# Patient Record
Sex: MALE
[De-identification: names, ages, dates, MRNs, and addresses within clinical notes are randomized per-mention and may not be internally consistent; named-entity substitution may affect disease eponyms.]

---

## 2017-12-26 ENCOUNTER — HOSPITAL ENCOUNTER (OUTPATIENT)
Dept: HOSPITAL 42 - ED | Age: 27
Setting detail: OBSERVATION
LOS: 2 days | Discharge: HOME | End: 2017-12-28
Attending: HOSPITALIST | Admitting: HOSPITALIST
Payer: OTHER GOVERNMENT

## 2017-12-26 VITALS — BODY MASS INDEX: 28.5 KG/M2

## 2017-12-26 DIAGNOSIS — R40.2412: ICD-10-CM

## 2017-12-26 DIAGNOSIS — J10.1: ICD-10-CM

## 2017-12-26 DIAGNOSIS — Z87.891: ICD-10-CM

## 2017-12-26 DIAGNOSIS — K52.9: ICD-10-CM

## 2017-12-26 DIAGNOSIS — A41.9: Primary | ICD-10-CM

## 2017-12-26 PROCEDURE — 84484 ASSAY OF TROPONIN QUANT: CPT

## 2017-12-26 PROCEDURE — 84145 PROCALCITONIN (PCT): CPT

## 2017-12-26 PROCEDURE — 87045 FECES CULTURE AEROBIC BACT: CPT

## 2017-12-26 PROCEDURE — 96376 TX/PRO/DX INJ SAME DRUG ADON: CPT

## 2017-12-26 PROCEDURE — 87430 STREP A AG IA: CPT

## 2017-12-26 PROCEDURE — 80320 DRUG SCREEN QUANTALCOHOLS: CPT

## 2017-12-26 PROCEDURE — 83992 ASSAY FOR PHENCYCLIDINE: CPT

## 2017-12-26 PROCEDURE — 80353 DRUG SCREENING COCAINE: CPT

## 2017-12-26 PROCEDURE — 80324 DRUG SCREEN AMPHETAMINES 1/2: CPT

## 2017-12-26 PROCEDURE — 83690 ASSAY OF LIPASE: CPT

## 2017-12-26 PROCEDURE — 96374 THER/PROPH/DIAG INJ IV PUSH: CPT

## 2017-12-26 PROCEDURE — 83735 ASSAY OF MAGNESIUM: CPT

## 2017-12-26 PROCEDURE — 71010: CPT

## 2017-12-26 PROCEDURE — 87389 HIV-1 AG W/HIV-1&-2 AB AG IA: CPT

## 2017-12-26 PROCEDURE — 80349 CANNABINOIDS NATURAL: CPT

## 2017-12-26 PROCEDURE — 93005 ELECTROCARDIOGRAM TRACING: CPT

## 2017-12-26 PROCEDURE — 87804 INFLUENZA ASSAY W/OPTIC: CPT

## 2017-12-26 PROCEDURE — 84100 ASSAY OF PHOSPHORUS: CPT

## 2017-12-26 PROCEDURE — 80358 DRUG SCREENING METHADONE: CPT

## 2017-12-26 PROCEDURE — 81001 URINALYSIS AUTO W/SCOPE: CPT

## 2017-12-26 PROCEDURE — 87324 CLOSTRIDIUM AG IA: CPT

## 2017-12-26 PROCEDURE — 36415 COLL VENOUS BLD VENIPUNCTURE: CPT

## 2017-12-26 PROCEDURE — 87070 CULTURE OTHR SPECIMN AEROBIC: CPT

## 2017-12-26 PROCEDURE — 85730 THROMBOPLASTIN TIME PARTIAL: CPT

## 2017-12-26 PROCEDURE — 80345 DRUG SCREENING BARBITURATES: CPT

## 2017-12-26 PROCEDURE — 85025 COMPLETE CBC W/AUTO DIFF WBC: CPT

## 2017-12-26 PROCEDURE — 74177 CT ABD & PELVIS W/CONTRAST: CPT

## 2017-12-26 PROCEDURE — 82550 ASSAY OF CK (CPK): CPT

## 2017-12-26 PROCEDURE — 87086 URINE CULTURE/COLONY COUNT: CPT

## 2017-12-26 PROCEDURE — 80361 OPIATES 1 OR MORE: CPT

## 2017-12-26 PROCEDURE — 72132 CT LUMBAR SPINE W/DYE: CPT

## 2017-12-26 PROCEDURE — 96375 TX/PRO/DX INJ NEW DRUG ADDON: CPT

## 2017-12-26 PROCEDURE — 82150 ASSAY OF AMYLASE: CPT

## 2017-12-26 PROCEDURE — 83615 LACTATE (LD) (LDH) ENZYME: CPT

## 2017-12-26 PROCEDURE — 82803 BLOOD GASES ANY COMBINATION: CPT

## 2017-12-26 PROCEDURE — 80346 BENZODIAZEPINES1-12: CPT

## 2017-12-26 PROCEDURE — 99285 EMERGENCY DEPT VISIT HI MDM: CPT

## 2017-12-26 PROCEDURE — 80053 COMPREHEN METABOLIC PANEL: CPT

## 2017-12-26 PROCEDURE — 85610 PROTHROMBIN TIME: CPT

## 2017-12-26 PROCEDURE — 87040 BLOOD CULTURE FOR BACTERIA: CPT

## 2017-12-26 NOTE — ED PDOC
Arrival/HPI





- General


Chief Complaint: GI Problem


Time Seen by Provider: 12/26/17 22:41


Historian: Patient





- History of Present Illness


Narrative History of Present Illness (Text): 





12/26/17 23:37


27 year old male, who denies any past medical history, presents complaining of 

nausea, vomiting, and generalized body aches for the past 24 hours. Patient 

states all her joints ache. Patient reports a fever of "102" this morning and 

lower back discomfort, but any chills, chest pain, shortness of breath, diarrhea

, urinary symptoms, neck pain, headache, dizziness, or any other complaints. 








Time/Duration: 24 hours


Symptom Onset: Sudden


Symptom Course: Unchanged


Activities at Onset: Light


Context: Home





Past Medical History





- Provider Review


Nursing Documentation Reviewed: Yes





- Infectious Disease


Hx of Infectious Diseases: None





- Tetanus Immunization


Tetanus Immunization: Unknown





- Past Medical History


Past Medical History: No Previous





- Cardiac


Hx Cardiac Disorders: No





- Pulmonary


Hx Respiratory Disorders: No





- Neurological


Hx Neurological Disorder: No





- HEENT


Hx HEENT Disorder: No





- Renal


Hx Renal Disorder: No





- Endocrine/Metabolic


Hx Endocrine Disorders: No





- Hematological/Oncological


Hx Blood Disorders: No





- Integumentary


Hx Dermatological Disorder: No





- Musculoskeletal/Rheumatological


Hx Musculoskeletal Disorders: No





- Gastrointestinal


Hx Gastrointestinal Disorders: No





- Genitourinary/Gynecological


Hx Genitourinary Disorders: No





- Psychiatric


Hx Psychophysiologic Disorder: No


Hx Depression: No


Hx Emotional Abuse: No


Hx Physical Abuse: No


Hx Substance Use: No





- Past Surgical History


Past Surgical History: No Previous





- Anesthesia


Hx Anesthesia: No


Hx Anesthesia Reactions: No


Hx Malignant Hyperthermia: No





- Suicidal Assessment


Feels Threatened In Home Enviroment: No





Family/Social History





- Physician Review


Nursing Documentation Reviewed: Yes


Family/Social History: No Known Family HX


Smoking Status: Light Smoker < 10 Cigarettes Daily


Hx Alcohol Use: No


Hx Substance Use: No


Hx Substance Use Treatment: No





Allergies/Home Meds


Allergies/Adverse Reactions: 


Allergies





No Known Allergies Allergy (Verified 12/27/17 13:52)


 








Home Medications: 


 Home Meds











 Medication  Instructions  Recorded  Confirmed


 


traZODone [trazODONE HYDROCHLORIDE] 50 mg PO PRN PRN 12/27/17 12/27/17














Review of Systems





- Physician Review


All systems were reviewed & negative as marked: Yes





- Review of Systems


Constitutional: Fevers.  absent: Other


Respiratory: absent: SOB


Cardiovascular: absent: Chest Pain


Gastrointestinal: Nausea, Vomiting.  absent: Diarrhea


Genitourinary Male: absent: Dysuria, Frequency, Hematuria


Musculoskeletal: Back Pain, Other (Generalized body aches).  absent: Neck Pain


Neurological: absent: Headache, Dizziness





Physical Exam


Vital Signs Reviewed: Yes


Vital Signs











  Temp Pulse Resp BP Pulse Ox


 


 12/27/17 10:01  100.5 F H    


 


 12/27/17 09:54  100.5 F H  86  18  144/91 H  98


 


 12/27/17 07:34   68  18  140/94 H  98


 


 12/27/17 05:27   78  16  141/86  97


 


 12/27/17 03:25  98.1 F  61  17  127/84  97


 


 12/27/17 02:56   78  19  132/74  98


 


 12/27/17 02:36  100.0 F H    


 


 12/27/17 01:46  100.0 F H  88  20  148/84  97


 


 12/26/17 22:59  102.3 F H  101 H  17  156/92 H  99


 


 12/26/17 22:58  102.3 F H  101 H  20  156/92 H  99











Temperature: Febrile


Blood Pressure: Normal


Pulse: Tachycardic


Respiratory Rate: Normal


Appearance: Positive for: Well-Appearing, Non-Toxic, Comfortable


Pain Distress: None


Mental Status: Positive for: Alert and Oriented X 3





- Systems Exam


Head: Present: Atraumatic, Normocephalic


Pupils: Present: PERRL


Extroacular Muscles: Present: EOMI


Conjunctiva: Present: Normal


Mouth: Present: Moist Mucous Membranes


Pharnyx: Present: ERYTHEMA (minimal)


Neck: Present: Normal Range of Motion.  No: Meningeal Signs


Respiratory/Chest: Present: Clear to Auscultation, Good Air Exchange.  No: 

Respiratory Distress, Accessory Muscle Use


Cardiovascular: Present: Regular Rate and Rhythm, Normal S1, S2.  No: Murmurs


Abdomen: Present: Normal Bowel Sounds.  No: Tenderness, Distention, Peritoneal 

Signs


Back: Present: Normal Inspection.  No: CVA Tenderness, Other (Dorsal Spine 

tenderness)


Upper Extremity: Present: Normal Inspection, Normal ROM, Neurovascularly 

Intact.  No: Cyanosis, Edema


Lower Extremity: Present: Normal Inspection, Normal ROM, Neurovascularly 

Intact.  No: Edema


Neurological: Present: GCS=15, CN II-XII Intact, Speech Normal, Motor Func 

Grossly Intact, Normal Sensory Function, Normal Cerebellar Funct, Norm Deep 

Tendon Reflexes


Skin: Present: Warm, Dry, Normal Color.  No: Rashes


Psychiatric: Present: Alert, Oriented x 3, Normal Insight, Normal Concentration





Medical Decision Making


ED Course and Treatment: 





12/26/17 23:36


Impression:


27 year old male presents complaining of nausea, vomiting, and generlized body 

aches for the past 24 hours associated with fever and lower back pain. 





Plan:


-- VBG 


-- EKG 


-- Labs 


-- Chest X-ray 


-- Lactated Ringer's 2,990 ml


-- Pepcid


-- Toradol


-- Zofran Inj


-- Blood Culture


-- Urine Cultrue


-- Urinalysis 


-- Reassess and disposition





Progress Notes:


12/27/17 00:11


EKG shows Sinus Tachycardia at 103 BPM with septal infarct. Non-specific ST/T 

wave changes. Interpreted by me. 


 


12/27/17 00:40


Labs were reviewed and showed Lactate 8.6. Code Sepsis called.





12/27/17 00:44


CXR Impression: As read by me, NAD


 


12/27/17 00:59


Discussed case with Resident and intensivist Dr. Silver who is aware and agrees 

with the plan.To ED to evaluate/admit pt. Patient will be admitted for Sepsis 





- Lab Interpretations


Lab Results: 








 12/26/17 23:54 





 12/26/17 23:54 





 Lab Results





12/27/17 00:11: Urine Color Yellow, Urine Appearance Clear, Urine pH 8.5, Ur 

Specific Gravity 1.015, Urine Protein 100 H, Urine Glucose (UA) Negative, Urine 

Ketones Trace H, Urine Blood Negative, Urine Nitrate Negative, Urine Bilirubin 

Small H, Urine Urobilinogen 2.0 H, Ur Leukocyte Esterase Negative, Urine RBC 0 

- 2, Urine WBC 0 - 2, Ur Epithelial Cells 0 - 2


12/26/17 23:54: Alcohol, Quantitative < 10


12/26/17 23:54: Lactate Dehydrogenase 661, Total Creatine Kinase 143, Troponin 

I < 0.01


12/26/17 23:54: Sodium 143, Chloride 101, Potassium 4.0, Carbon Dioxide 19 L, 

Anion Gap 27 H, BUN 9, Creatinine 1.1, Est GFR (African Amer) > 60, Est GFR (Non

-Af Amer) > 60, Random Glucose 120 H, Calcium 10.4, Phosphorus 1.9 L, Magnesium 

1.3 L, Total Bilirubin 1.4 H, AST 96 H, ALT 77 H, Alkaline Phosphatase 81, 

Total Protein 9.1 H, Albumin 5.3 H, Globulin 3.8, Albumin/Globulin Ratio 1.4


12/26/17 23:54: pO2 56 H, VBG pH 7.43, VBG pCO2 32.0 L, VBG HCO3 21.2, VBG 

Total CO2 22.2, VBG O2 Sat (Calc) 93.6 H, VBG Base Excess -2.3 L, VBG Potassium 

4.4, Sodium 141.0, Chloride 99.0, Glucose 119 H, Lactate 8.6 H*, FiO2 21.0, 

Venous Blood Potassium 4.4


12/26/17 23:54: PT 12.3, INR 1.13 H, APTT 28.2


12/26/17 23:54: WBC 7.7, RBC 5.13, Hgb 17.7, Hct 50.3, MCV 98.1, MCH 34.5, MCHC 

35.2, RDW 12.1, Plt Count 195, MPV 11.1 H, Gran % 49.4 L, Lymph % (Auto) 32.9, 

Mono % (Auto) 16.6 H, Eos % (Auto) 0.5 L, Baso % (Auto) 0.6, Gran # 3.82, Lymph 

# 2.5, Mono # 1.3 H, Eos # 0.0, Baso # 0.05








I have reviewed the lab results: Yes





- RAD Interpretation


Radiology Orders: 








12/26/17 23:27


CHEST PORTABLE [RAD] Stat 





12/27/17 01:03


LUMBAR SPINE W/CONTRAST [CT] Stat 





12/27/17 01:34


ABDOMEN & PELVIS [ABD PELVIS PO & IV CONTRAST] [CT] Stat 














- EKG Interpretation


Interpreted by ED Physician: Yes


Type: 12 lead EKG





- Medication Orders


Current Medication Orders: 








Acetaminophen (Tylenol 325mg Tab)  650 mg PO Q6H PRN


   PRN Reason: Fever >100.4 F


   Last Admin: 12/27/17 10:01  Dose: 650 mg





MAR Pain/Vitals


 Document     12/27/17 10:01  [facility]  (Rec: 12/27/17 10:01  [facility]  [id number])


     Pain Reassessment


      Is This A Pain ReAssessment?               No


     Vitals


      Temperature (97.6 F-99.6 F)                100.5 F





Benzonatate (Tessalon Perles)  100 mg PO TID [facility]


   Last Admin: 12/27/17 15:00  Dose: 100 mg





Guaifenesin (Robitussin)  100 mg PO Q4H PRN


   PRN Reason: Cough


   Last Admin: 12/27/17 12:35  Dose: 100 mg





Sodium Chloride (Sodium Chloride 0.9%)  1,000 mls @ 100 mls/hr IV .Q10H [facility]


   Last Admin: 12/27/17 12:30  Dose: 100 mls/hr





eMAR Start Stop


 Document     12/27/17 12:30  LMN  (Rec: 12/27/17 13:15  LMN  [facility])


     Intravenous Solution


      Start Date                                 12/27/17


      Start Time                                 12:30





Morphine Sulfate (Morphine)  0.5 mg IVP Q4H PRN


   PRN Reason: Pain, moderate (4-7)


   Last Admin: 12/27/17 14:49  Dose: 0.5 mg





MAR Pain Assessment


 Document     12/27/17 14:49  LMN  (Rec: 12/27/17 14:50  LMN  [place] [id number])


     Pain Reassessment


      Is this a pain reassessment?               No


     Presence of Pain


      Presence of Pain                           Yes


     Pain Scale Used


      Pain Scale Used                            Numeric


     Location


      Pain Location Body Site                    Back


     Description


      Description                                Constant


      Intensity of Pain at present               8


IVP Administration


 Document     12/27/17 14:49  LMN  (Rec: 12/27/17 14:50  LMN  [place] [id number])


     Charges for Administration


      # of IVP Administrations                   1





Ondansetron HCl (Zofran Inj)  4 mg IVP Q4H PRN


   PRN Reason: Nausea/Vomiting


   Last Admin: 12/27/17 04:53  Dose: 4 mg





IVP Administration


 Document     12/27/17 04:53  CNR  (Rec: 12/27/17 04:53  CNR  [id number])


     Charges for Administration


      # of IVP Administrations                   1





Oseltamivir Phosphate (Tamiflu Cap)  75 mg PO BID [facility]


   PRN Reason: Protocol


   Stop: 01/01/18 05:30


   Last Admin: 12/27/17 10:01  Dose: 75 mg





Pantoprazole Sodium (Protonix Inj)  40 mg IVP DAILY [facility]


   Last Admin: 12/27/17 10:00  Dose: 40 mg





IVP Administration


 Document     12/27/17 10:00  CMA  (Rec: 12/27/17 10:00  [facility]  [id number])


     Charges for Administration


      # of IVP Administrations                   1





Sennosides (Senokot Tab)  8.6 mg PO DAILY PRN


   PRN Reason: Constipation





Discontinued Medications





Acetaminophen (Tylenol 325mg Tab)  650 mg PO STAT STA


   Stop: 12/27/17 00:05


   Last Admin: 12/27/17 02:36  Dose: 650 mg





MAR Pain/Vitals


 Document     12/27/17 02:36  [email or web address]  (Rec: 12/27/17 02:37  [id number]


     Vitals


      Temperature (97.6 F-99.6 F)                100.0 F





Famotidine (Pepcid)  20 mg IVP STAT STA


   Stop: 12/26/17 23:32


   Last Admin: 12/27/17 00:32  Dose: 20 mg





IVP Administration


 Document     12/27/17 00:32  CASTS1  (Rec: 12/27/17 00:32  [address]-[id number])


     Charges for Administration


      # of IVP Administrations                   1





Lactated Ringer's 2,990 ml/ IV (SUPPLIES)  2,990 mls @ 5,987.4 mls/hr IV ONCE 

ONE


   PRN Reason: 60 ML/KG/HR


   Stop: 12/26/17 23:28


   Last Admin: 12/27/17 00:03  Dose: 5,987.4 mls/hr





eMAR Start Stop


 Document     12/27/17 00:03  CASTS1  (Rec: 12/27/17 00:04  [address]-[id number])


     Intravenous Solution


      Start Date                                 12/27/17


      Start Time                                 00:04


      End Date                                   12/27/17





Ceftriaxone Sodium (Rocephin 2 Gm Ivpb)  2 gm in 100 mls @ 100 mls/hr IVPB STAT 

STA


   PRN Reason: Protocol


   Stop: 12/27/17 01:44


   Last Admin: 12/27/17 01:08  Dose: 100 mls/hr





eMAR Start Stop


 Document     12/27/17 01:08  CASTS1  (Rec: 12/27/17 01:09  [address]-[id number])


     Intravenous Solution


      Start Date                                 12/27/17


      Start Time                                 01:09


      End Date                                   12/27/17





Vancomycin HCl (Vancomycin 1gm)  1 gm in 250 mls @ 167 mls/hr IVPB STAT STA


   PRN Reason: Protocol


   Stop: 12/27/17 02:14


   Last Admin: 12/27/17 02:54  Dose: 167 mls/hr





eMAR Start Stop


 Document     12/27/17 02:54  CASTS1  (Rec: 12/27/17 02:54  [facility]  [id number]


     Intravenous Solution


      Start Date                                 12/27/17


      Start Time                                 02:54


      End Date                                   12/27/17





Magnesium Sulfate 2 gm/ Sodium (Chloride)  104 mls @ 102 mls/hr IV ONCE ONE


   Stop: 12/27/17 02:31


   Last Admin: 12/27/17 04:38  Dose: 102 mls/hr





eMAR Start Stop


 Document     12/27/17 04:38  CNR  (Rec: 12/27/17 04:38  CNR  [id number])


     Intravenous Solution


      Start Date                                 12/27/17


      Start Time                                 04:38





Potassium Phosphate 15 mmole/ (Sodium Chloride)  255 mls @ 42.5 mls/hr IVPB 

ONCE ONE


   Stop: 12/27/17 07:29


   Last Admin: 12/27/17 02:54  Dose: 42.5 mls/hr





eMAR Start Stop


 Document     12/27/17 02:54  CASTS1  (Rec: 12/27/17 02:54  CASTS[facility]  [id number][place]


     Intravenous Solution


      Start Date                                 12/27/17


      Start Time                                 02:54


      End Date                                   12/27/17





Sodium Chloride (Sodium Chloride 0.9%)  1,000 mls @ 150 mls/hr IV .Q6H40M [facility]


   Last Admin: 12/27/17 09:15  Dose: 150 mls/hr





eMAR Start Stop


 Document     12/27/17 09:15  CMA  (Rec: 12/27/17 09:16  CMA  [id number])


     Intravenous Solution


      Start Date                                 12/27/17


      Start Time                                 09:16





Piperacillin Sod/Tazobactam Sod (Zosyn 3.375 In Ns 100ml)  100 mls @ 200 mls/hr 

IVPB Q6 [facility]


   PRN Reason: Protocol


   Stop: 12/27/17 12:29


   Last Admin: 12/27/17 11:48  Dose: 200 mls/hr





eMAR Start Stop


 Document     12/27/17 11:48  LMN  (Rec: 12/27/17 11:48  LMN  [id number])


     Intravenous Solution


      Start Date                                 12/27/17


      Start Time                                 11:48





Ketorolac Tromethamine (Toradol)  30 mg IVP ONCE ONE


   Stop: 12/26/17 23:32


   Last Admin: 12/27/17 00:04  Dose: 30 mg





MAR Pain Assessment


 Document     12/27/17 00:04  [facility]  (Rec: 12/27/17 00:04  [address]-[id number])


     Pain Reassessment


      Is this a pain reassessment?               No


     Sleep


      Is patient sleeping during reassessment?   No


     Presence of Pain


      Presence of Pain                           Yes


     Pain Scale Used


      Pain Scale Used                            Numeric


     Location


      Pain Location Body Site                    Abdomen


     Description


      Description                                Constant


      Intensity of Pain at present               8


      Pain Behavior                              Facial Grimacing


      Aggravating Factors                        Changing Position


      Alleviating Factors/Management             Medication


       Techniques                                


      Alleviating Factors                        Medication


IVP Administration


 Document     12/27/17 00:04  [facility]  (Rec: 12/27/17 00:04  [address]-[id number])


     Charges for Administration


      # of IVP Administrations                   1





Magnesium Oxide (Mag-Ox)  400 mg PO STAT STA


   Stop: 12/27/17 09:34


   Last Admin: 12/27/17 10:00  Dose: 400 mg





Ondansetron HCl (Zofran Inj)  4 mg IVP ONCE ONE


   Stop: 12/26/17 23:32


   Last Admin: 12/27/17 00:04  Dose: 4 mg





IVP Administration


 Document     12/27/17 00:04  [facility]  (Rec: 12/27/17 00:04  [address]-[id number])


     Charges for Administration


      # of IVP Administrations                   1





Pneumococcal Polyvalent Vaccine (Pneumovax 23 Vaccine)  0.5 ml IM .ONCE ONE


   Stop: 12/27/17 16:33


Potassium Chloride (K-Dur 20 Meq Er Tab)  40 meq PO STAT STA


   Stop: 12/27/17 09:34


   Last Admin: 12/27/17 10:02  Dose: 40 meq





Sennosides (Senokot Tab)  8.6 mg PO DAILY [facility]











- Scribe Statement


The provider has reviewed the documentation as recorded by the Jim Bowles





Provider Scribe Attestation:


All medical record entries made by the Scribe were at my direction and 

personally dictated by me. I have reviewed the chart and agree that the record 

accurately reflects my personal performance of the history, physical exam, 

medical decision making, and the department course for this patient. I have 

also personally directed, reviewed, and agree with the discharge instructions 

and disposition.








Disposition/Present on Arrival





- Present on Arrival


Any Indicators Present on Arrival: No


History of DVT/PE: No


History of Uncontrolled Diabetes: No


Urinary Catheter: No


History of Decub. Ulcer: No


History Surgical Site Infection Following: None





- Disposition


Have Diagnosis and Disposition been Completed?: Yes


Diagnosis: 


 Sepsis





Disposition: HOSPITALIZED


Disposition Time: 01:48


Patient Plan: Admission


Patient Problems: 


 Current Active Problems











Problem Status Onset


 


Sepsis Acute  











Condition: STABLE

## 2017-12-27 LAB
ALBUMIN SERPL-MCNC: 3.7 G/DL (ref 3–4.8)
ALBUMIN SERPL-MCNC: 5.3 G/DL (ref 3–4.8)
ALBUMIN/GLOB SERPL: 1.4 {RATIO} (ref 1.1–1.8)
ALBUMIN/GLOB SERPL: 1.4 {RATIO} (ref 1.1–1.8)
ALT SERPL-CCNC: 55 U/L (ref 7–56)
ALT SERPL-CCNC: 77 U/L (ref 7–56)
AMYLASE SERPL-CCNC: 68 U/L (ref 35–125)
APPEARANCE UR: CLEAR
APTT BLD: 28.2 SECONDS (ref 25.1–36.5)
AST SERPL-CCNC: 59 U/L (ref 17–59)
AST SERPL-CCNC: 96 U/L (ref 17–59)
BASE EXCESS BLDV CALC-SCNC: -2.3 MMOL/L (ref 0–2)
BASE EXCESS BLDV CALC-SCNC: 2 MMOL/L (ref 0–2)
BASOPHILS # BLD AUTO: 0.04 K/MM3 (ref 0–2)
BASOPHILS # BLD AUTO: 0.05 K/MM3 (ref 0–2)
BASOPHILS NFR BLD: 0.6 % (ref 0–3)
BASOPHILS NFR BLD: 1.1 % (ref 0–3)
BILIRUB UR-MCNC: (no result) MG/DL
BUN SERPL-MCNC: 8 MG/DL (ref 7–21)
BUN SERPL-MCNC: 9 MG/DL (ref 7–21)
CALCIUM SERPL-MCNC: 10.4 MG/DL (ref 8.4–10.5)
CALCIUM SERPL-MCNC: 8 MG/DL (ref 8.4–10.5)
COLOR UR: YELLOW
EOSINOPHIL # BLD: 0 10*3/UL (ref 0–0.7)
EOSINOPHIL # BLD: 0 10*3/UL (ref 0–0.7)
EOSINOPHIL NFR BLD: 0.3 % (ref 1.5–5)
EOSINOPHIL NFR BLD: 0.5 % (ref 1.5–5)
EPI CELLS #/AREA URNS HPF: (no result) /HPF (ref 0–5)
ERYTHROCYTE [DISTWIDTH] IN BLOOD BY AUTOMATED COUNT: 12.1 % (ref 11.5–14.5)
ERYTHROCYTE [DISTWIDTH] IN BLOOD BY AUTOMATED COUNT: 12.2 % (ref 11.5–14.5)
GFR NON-AFRICAN AMERICAN: > 60
GFR NON-AFRICAN AMERICAN: > 60
GLUCOSE UR STRIP-MCNC: NEGATIVE MG/DL
GRANULOCYTES # BLD: 2.52 10*3/UL (ref 1.4–6.5)
GRANULOCYTES # BLD: 3.82 10*3/UL (ref 1.4–6.5)
GRANULOCYTES NFR BLD: 49.4 % (ref 50–68)
GRANULOCYTES NFR BLD: 66.9 % (ref 50–68)
HGB BLD-MCNC: 14 G/DL (ref 14–18)
HGB BLD-MCNC: 17.7 G/DL (ref 14–18)
INR PPP: 1.13 (ref 0.93–1.08)
LEUKOCYTE ESTERASE UR-ACNC: NEGATIVE LEU/UL
LIPASE SERPL-CCNC: 167 U/L (ref 23–300)
LYMPHOCYTES # BLD: 0.6 10*3/UL (ref 1.2–3.4)
LYMPHOCYTES # BLD: 2.5 10*3/UL (ref 1.2–3.4)
LYMPHOCYTES NFR BLD AUTO: 16 % (ref 22–35)
LYMPHOCYTES NFR BLD AUTO: 32.9 % (ref 22–35)
MAGNESIUM SERPL-MCNC: 1.3 MG/DL (ref 1.7–2.2)
MAGNESIUM SERPL-MCNC: 1.5 MG/DL (ref 1.7–2.2)
MCH RBC QN AUTO: 33.7 PG (ref 25–35)
MCH RBC QN AUTO: 34.5 PG (ref 25–35)
MCHC RBC AUTO-ENTMCNC: 34.2 G/DL (ref 31–37)
MCHC RBC AUTO-ENTMCNC: 35.2 G/DL (ref 31–37)
MCV RBC AUTO: 98.1 FL (ref 80–105)
MCV RBC AUTO: 98.6 FL (ref 80–105)
MONOCYTES # BLD AUTO: 0.6 10*3/UL (ref 0.1–0.6)
MONOCYTES # BLD AUTO: 1.3 10*3/UL (ref 0.1–0.6)
MONOCYTES NFR BLD: 15.7 % (ref 1–6)
MONOCYTES NFR BLD: 16.6 % (ref 1–6)
PH BLDV: 7.4 [PH] (ref 7.32–7.43)
PH BLDV: 7.43 [PH] (ref 7.32–7.43)
PH UR STRIP: 8.5 [PH] (ref 4.7–8)
PLATELET # BLD: 144 10^3/UL (ref 120–450)
PLATELET # BLD: 195 10^3/UL (ref 120–450)
PMV BLD AUTO: 10.6 FL (ref 7–11)
PMV BLD AUTO: 11.1 FL (ref 7–11)
PROT UR STRIP-MCNC: 100 MG/DL
PROTHROMBIN TIME: 12.3 SECONDS (ref 9.4–12.5)
RBC # BLD AUTO: 4.15 10^6/UL (ref 3.5–6.1)
RBC # BLD AUTO: 5.13 10^6/UL (ref 3.5–6.1)
RBC # UR STRIP: NEGATIVE /UL
RBC #/AREA URNS HPF: (no result) /HPF (ref 0–2)
SP GR UR STRIP: 1.01 (ref 1–1.03)
TROPONIN I SERPL-MCNC: < 0.01 NG/ML
URINE NITRATE: NEGATIVE
UROBILINOGEN UR STRIP-ACNC: 2 E.U./DL
VENOUS BLOOD FIO2: 21 %
VENOUS BLOOD FIO2: 21 %
VENOUS BLOOD GAS PCO2: 32 (ref 40–60)
VENOUS BLOOD GAS PCO2: 44 (ref 40–60)
VENOUS BLOOD GAS PO2: 48 MM/HG (ref 30–55)
VENOUS BLOOD GAS PO2: 56 MM/HG (ref 30–55)
WBC # BLD AUTO: 3.8 10^3/UL (ref 4.5–11)
WBC # BLD AUTO: 7.7 10^3/UL (ref 4.5–11)
WBC #/AREA URNS HPF: (no result) /HPF (ref 0–6)

## 2017-12-27 RX ADMIN — MORPHINE SULFATE PRN MG: 2 INJECTION, SOLUTION INTRAMUSCULAR; INTRAVENOUS at 19:02

## 2017-12-27 RX ADMIN — MORPHINE SULFATE PRN MG: 2 INJECTION, SOLUTION INTRAMUSCULAR; INTRAVENOUS at 14:49

## 2017-12-27 RX ADMIN — PIPERACILLIN AND TAZOBACTAM SCH MLS/HR: 3; .375 INJECTION, POWDER, LYOPHILIZED, FOR SOLUTION INTRAVENOUS; PARENTERAL at 06:49

## 2017-12-27 RX ADMIN — MORPHINE SULFATE PRN MG: 2 INJECTION, SOLUTION INTRAMUSCULAR; INTRAVENOUS at 23:02

## 2017-12-27 RX ADMIN — MORPHINE SULFATE PRN MG: 2 INJECTION, SOLUTION INTRAMUSCULAR; INTRAVENOUS at 02:36

## 2017-12-27 RX ADMIN — MORPHINE SULFATE PRN MG: 2 INJECTION, SOLUTION INTRAMUSCULAR; INTRAVENOUS at 06:43

## 2017-12-27 RX ADMIN — PIPERACILLIN AND TAZOBACTAM SCH MLS/HR: 3; .375 INJECTION, POWDER, LYOPHILIZED, FOR SOLUTION INTRAVENOUS; PARENTERAL at 11:48

## 2017-12-27 NOTE — PCM.SEPTIC
<Jazzy Burnett - Last Filed: 12/27/17 06:29>





Sepsis Progress Note





- Reassessment Type


Date of Evaluation: 12/27/17


Time of Evaluation: 06:05


Reassessment Type: Non-invasive reassessment





- Non Invasive Reassessment


Were the most recent vital sign reviewed: Yes


Vital Sign (Latest): 


 











Temp Pulse Resp BP Pulse Ox


 


 98.1 F   78   16   141/86   97 


 


 12/27/17 03:25  12/27/17 05:27  12/27/17 05:27  12/27/17 05:27  12/27/17 05:27








Cardiovascular: Yes: Regular Rate, Rhythm.  No: Chest Non Tender, Edema, Gallop

, JVD, Murmur, Bradycardia, Tachycardia, Ectopy, Friction Rub, Irregularly 

Irregular


Respiratory: Yes: Normal Breath Sounds.  No: Decreased Breath Sounds, Accessory 

Muscle Use, Crackles, Rales, Rhonchi, Stridor, Wheezing, Respiratory Distress, 

Plerual Rub


Capillary Refill: Normal (Less than 2 sec)


Pulses: Normal Radial, Normal Dorsalis Pedis, Normal Posterior Tibialis


Skin: Normal Color, Warm





- Invasive Reassessment (complete 2 of 4)


Was a Central Venous Pressure Measurement obtained within 6 Hours after the 

presentation of septic shock: No


Was a central venous oxygen measurement obtained within 6 hours after the 

presentation of septic shock: No


Was a bedside cardiovascular ultrasound performed within 6 hours after the 

presentation of septic shock: No


Was a passive leg raise performed or was a fluid challenge performed within 6 

hrs of the initial fluid bolus: No


Passive Leg Raise Result: Not Applicable


Fluid Challenge performed: No





<West Silver MD - Last Filed: 12/27/17 07:22>





Sepsis Progress Note





- Non Invasive Reassessment


Vital Sign (Latest): 


 











Temp Pulse Resp BP Pulse Ox


 


 98.1 F   78   16   141/86   97 


 


 12/27/17 03:25  12/27/17 05:27  12/27/17 05:27  12/27/17 05:27  12/27/17 05:27
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
## 2017-12-27 NOTE — CT
EXAM:

  CT Lumbar Spine With Intravenous Contrast



EXAM DATE/TIME:

  12/27/2017 1:03 AM



CLINICAL HISTORY:

  27 years old, male; Pain; Low back pain; Additional info: Pain/fever



TECHNIQUE:

  Axial computed tomography images of the lumbar spine with intravenous 

contrast.  All CT scans at this facility use one or more dose reduction 

techniques, viz.: automated exposure control; ma/kV adjustment per patient size 

(including targeted exams where dose is matched to indication; i.e. head); or 

iterative reconstruction technique.

  Coronal and sagittal reformatted images were created and reviewed.



CONTRAST:

  100 mL of OMNI 350 administered intravenously.



COMPARISON:

  No relevant prior studies available.



FINDINGS:

  Vertebrae:  Unremarkable.  No acute fracture.

  Discs/spinal canal/neural foramina:  No acute findings.  No spinal canal 

stenosis.

  Soft tissues:  No paravertebral edema.



IMPRESSION:     

  No acute abnormality.

## 2017-12-27 NOTE — CARD
--------------- APPROVED REPORT --------------





EKG Measurement

Heart [id number]

NM 150P53

[id number]

[id number]

[id number]



<Conclusion>

Sinus tachycardia

Possible Left atrial enlargement

Septal infarct, age undetermined

Abnormal ECG

## 2017-12-28 VITALS
TEMPERATURE: 98.9 F | RESPIRATION RATE: 20 BRPM | OXYGEN SATURATION: 99 % | SYSTOLIC BLOOD PRESSURE: 154 MMHG | HEART RATE: 66 BPM | DIASTOLIC BLOOD PRESSURE: 94 MMHG

## 2017-12-28 LAB
ALBUMIN SERPL-MCNC: 3.9 G/DL (ref 3–4.8)
ALBUMIN/GLOB SERPL: 1.4 {RATIO} (ref 1.1–1.8)
ALT SERPL-CCNC: 132 U/L (ref 7–56)
AST SERPL-CCNC: 166 U/L (ref 17–59)
BASOPHILS # BLD AUTO: 0.04 K/MM3 (ref 0–2)
BASOPHILS NFR BLD: 1.5 % (ref 0–3)
BUN SERPL-MCNC: 5 MG/DL (ref 7–21)
CALCIUM SERPL-MCNC: 8.7 MG/DL (ref 8.4–10.5)
EOSINOPHIL # BLD: 0.1 10*3/UL (ref 0–0.7)
EOSINOPHIL NFR BLD: 2.7 % (ref 1.5–5)
EOSINOPHIL NFR BLD: 3 % (ref 0–3)
ERYTHROCYTE [DISTWIDTH] IN BLOOD BY AUTOMATED COUNT: 12.2 % (ref 11.5–14.5)
GFR NON-AFRICAN AMERICAN: > 60
GRANULOCYTES # BLD: 0.87 10*3/UL (ref 1.4–6.5)
GRANULOCYTES NFR BLD: 33.1 % (ref 50–68)
HGB BLD-MCNC: 14.6 G/DL (ref 14–18)
LYMPHOCYTE: 41 % (ref 22–35)
LYMPHOCYTES # BLD: 1 10*3/UL (ref 1.2–3.4)
LYMPHOCYTES NFR BLD AUTO: 39.5 % (ref 22–35)
MAGNESIUM SERPL-MCNC: 2 MG/DL (ref 1.7–2.2)
MCH RBC QN AUTO: 33.3 PG (ref 25–35)
MCHC RBC AUTO-ENTMCNC: 33.8 G/DL (ref 31–37)
MCV RBC AUTO: 98.4 FL (ref 80–105)
MONOCYTE: 16 % (ref 1–6)
MONOCYTES # BLD AUTO: 0.6 10*3/UL (ref 0.1–0.6)
MONOCYTES NFR BLD: 23.2 % (ref 1–6)
NEUTROPHILS NFR BLD AUTO: 36 % (ref 50–70)
NEUTS BAND NFR BLD: 1 % (ref 0–2)
PLATELET # BLD EST: (no result) 10*3/UL
PLATELET # BLD: 127 10^3/UL (ref 120–450)
PMV BLD AUTO: 10.7 FL (ref 7–11)
RBC # BLD AUTO: 4.39 10^6/UL (ref 3.5–6.1)
VARIANT LYMPHS NFR BLD MANUAL: 3 % (ref 0–0)
WBC # BLD AUTO: 2.6 10^3/UL (ref 4.5–11)

## 2017-12-28 RX ADMIN — MORPHINE SULFATE PRN MG: 2 INJECTION, SOLUTION INTRAMUSCULAR; INTRAVENOUS at 05:17

## 2017-12-28 NOTE — CON
DATE:  12/27/2017



The patient seen early this morning in the emergency room.



CHIEF COMPLAINT:  Weakness times several days.



HISTORY OF PRESENT ILLNESS:  This is a 27-year-old male who is seen in the

emergency room early this morning complaining of persistent nausea,

vomiting, generalized aches and pains, reported having fevers, back pain

and chills.  Denies any shortness of breath or cough.  He did have diarrhea

in the emergency room, but says he did not have diarrhea earlier.  Denies

any dysuria or frequency or headaches.  No blurred vision.



PAST MEDICAL HISTORY:  No significant past medical history.  The patient is

a .  He was in [facility] many years ago and he is unemployed.  He

is a smoker.  He drinks alcohol.



ALLERGIES:  HE HAS NO KNOWN ALLERGIES.



MEDICATIONS:  Medications at home include trazodone.



PHYSICAL EXAMINATION:

GENERAL:  He is in bed, in no acute distress, answering questions.

VITAL SIGNS:  He did have a temperature of 102.3, blood pressure 140/90,

pulse of 68, which was up to 101, respiratory rate of 18 up to 20.

HEENT:  Unremarkable.

NECK:  Supple.

HEART:  Normal S1 and S2.

LUNGS:  Decreased breath sounds.



LABORATORY DATA:  White count 3.8.  He does have a hemoglobin of 17, down

to 14, 49% granulocytosis.  Coagulation is noted.  BUN of 9, creatinine of

1.1.  LFTs are noted.  Procalcitonin 0.55.  Alkaline phosphatase is noted

to be normal.  Urinalysis reveals to be unremarkable.  Toxicology screen is

negative.  Influenza is positive.  The patient's group B Strep is negative.

Dr. Silver's note is reviewed _____.  Dr. Silver's history and physical

examination is reviewed.  The patient had a CAT scan of the abdomen and

pelvis, which showed nonspecific sigmoid colitis.  The patient had a lumbar

spine CAT scan.  No acute abnormality.  Chest x-ray, no active pulmonary

disease.  An EKG with a QTc of 468.



ASSESSMENT AND PLAN:  A 27-year-old male with sepsis secondary to influenza

and gastroenteritis.  We will check on blood cultures and throat cultures. 

Order urine cultures and stool cultures and stool for Clostridium

difficile.  The patient is already started on Tamiflu.  The patient should

be isolated and we will check on pan cultures.  We will wait for further

recommendation.  The patient consistent with influenza.  Once he is able to

take p.o., may complete the Tamiflu therapy with 5 days duration as

outpatient.



__________________________________________

Francis English MD



DD:  12/27/2017 22:24:31

DT:  12/28/2017 0:18:39

Job # [id number]

## 2017-12-28 NOTE — PN
DATE:  12/28/2017



SUBJECTIVE:  The patient is seen earlier in bed in 570.  The patient's

temperature is down.  He is doing well.  He is eating and overall, he is

much improved.



PHYSICAL EXAMINATION:

VITAL SIGNS:  Temperature is 98, blood pressure is 150/90, respiratory rate

of 20, and heart rate of 66.

HEENT:  Unremarkable.

NECK:  Supple.

LUNGS:  Have decreased breath sounds.

HEART:  Normal S1 and S2.

ABDOMEN:  Soft and nontender.



LABORATORY DATA:  Reveals the patient's white count is down to 2.6,

hemoglobin of 14, and platelets of 127 and coagulation is noted and blood

gases are reviewed.  LFTs are elevated and procalcitonin is 0.55 and AST is

also elevated and urinalysis is unremarkable and toxicology is noted. 

Influenza is positive.  Microbiology reveals the patient's blood cultures

are no growth.  Urine cultures are no growth.  The group A strep negative

for sore throat.  Stool for C. diff antigen and toxin are negative.



ASSESSMENT AND PLAN:  A 27-year-old male with sepsis secondary to influenza

and gastroenteritis with stool cultures and stool C. diff is negative and

blood cultures negative, now with influenza, complete with 5 days of

Tamiflu.  The patient follow up with PMD and we will followup laboratories

and resolution of his laboratories as outpatient.





__________________________________________

Francis English MD





DD:  12/28/2017 13:10:45

DT:  12/28/2017 13:12:52

Job # [id number]

## 2017-12-28 NOTE — CP.PCM.DIS
<Ottoniel Jimenez - Last Filed: 12/28/17 12:20>





Provider





- Provider


Date of Admission: 


12/27/17 01:49





Attending physician: 


Caity Wasserman MD





Consults: 





ID: [id number]


Time Spent in preparation of Discharge (in minutes): 45





Hospital Course





- Lab Results


Lab Results: 


 Micro Results





12/27/17 15:00   Stool   C. difficile Antigen & Toxin A,B (M - Final


12/27/17 03:03   Throat   Group A Strep Throat Culture - Final


                            NO BETA STREP GROUP A ISOLATED.





 Most Recent Lab Values











WBC  2.6 10^3/ul (4.5-11.0)  L* D 12/28/17  07:00    


 


RBC  4.39 10^6/uL (3.5-6.1)   12/28/17  07:00    


 


Hgb  14.6 g/dL (14.0-18.0)   12/28/17  07:00    


 


Hct  43.2 % (42.0-52.0)   12/28/17  07:00    


 


MCV  98.4 fl (80.0-105.0)   12/28/17  07:00    


 


MCH  33.3 pg (25.0-35.0)   12/28/17  07:00    


 


MCHC  33.8 g/dl (31.0-37.0)   12/28/17  07:00    


 


RDW  12.2 % (11.5-14.5)   12/28/17  07:00    


 


Plt Count  127 10^3/uL (120.0-450.0)   12/28/17  07:00    


 


MPV  10.7 fl (7.0-11.0)   12/28/17  07:00    


 


Gran %  33.1 % (50.0-68.0)  L  12/28/17  07:00    


 


Lymph % (Auto)  39.5 % (22.0-35.0)  H  12/28/17  07:00    


 


Mono % (Auto)  23.2 % (1.0-6.0)  H  12/28/17  07:00    


 


Eos % (Auto)  2.7 % (1.5-5.0)   12/28/17  07:00    


 


Baso % (Auto)  1.5 % (0.0-3.0)   12/28/17  07:00    


 


Gran #  0.87  (1.4-6.5)  L  12/28/17  07:00    


 


Lymph #  1.0  (1.2-3.4)  L  12/28/17  07:00    


 


Mono #  0.6  (0.1-0.6)   12/28/17  07:00    


 


Eos #  0.1  (0.0-0.7)   12/28/17  07:00    


 


Baso #  0.04 K/mm3 (0.0-2.0)   12/28/17  07:00    


 


Neutrophils % (Manual)  36 % (50.0-70.0)  L  12/28/17  07:00    


 


Band Neutrophils %  1 % (0-2)   12/28/17  07:00    


 


Lymphocytes % (Manual)  41 % (22.0-35.0)  H  12/28/17  07:00    


 


Atypical Lymphs %  3 % (0.0-0.0)  H  12/28/17  07:00    


 


Monocytes % (Manual)  16 % (1.0-6.0)  H  12/28/17  07:00    


 


Eosinophils % (Manual)  3 % (0.0-3.0)   12/28/17  07:00    


 


Platelet Evaluation  Low  (NORMAL)   12/28/17  07:00    


 


PT  12.3 SECONDS (9.4-12.5)   12/26/17  23:54    


 


INR  1.13  (0.93-1.08)  H  12/26/17  23:54    


 


APTT  28.2 Seconds (25.1-36.5)   12/26/17  23:54    


 


pO2  48 mm/Hg (30-55)   12/27/17  03:03    


 


VBG pH  7.40  (7.32-7.43)   12/27/17  03:03    


 


VBG pCO2  44.0  (40-60)   12/27/17  03:03    


 


VBG HCO3  27.3 mmol/l (21-28)   12/27/17  03:03    


 


VBG Total CO2  28.7 mmol.L (22-28)  H  12/27/17  03:03    


 


VBG O2 Sat (Calc)  88.8 % (40-65)  H  12/27/17  03:03    


 


VBG Base Excess  2.0 mmol/L (0.0-2.0)   12/27/17  03:03    


 


VBG Potassium  4.0 mmol/L (3.6-5.2)   12/27/17  03:03    


 


Sodium  137.0 mmol/L (132-148)   12/27/17  03:03    


 


Chloride  103.0 mmol/L ()   12/27/17  03:03    


 


Glucose  108 mg/dl ()   12/27/17  03:03    


 


Lactate  1.0 mmol/L (0.7-2.1)   12/27/17  03:03    


 


FiO2  21.0 %  12/27/17  03:03    


 


Sodium  139 mmol/L (132-148)   12/28/17  07:00    


 


Potassium  3.7 mmol/L (3.6-5.0)   12/28/17  07:00    


 


Chloride  105 mmol/L ()   12/28/17  07:00    


 


Carbon Dioxide  26 mmol/L (21-33)   12/28/17  07:00    


 


Anion Gap  12  (10-20)   12/28/17  07:00    


 


BUN  5 mg/dL (7-21)  L  12/28/17  07:00    


 


Creatinine  0.8 mg/dl (0.8-1.5)   12/28/17  07:00    


 


Est GFR ( Amer)  > 60   12/28/17  07:00    


 


Est GFR (Non-Af Amer)  > 60   12/28/17  07:00    


 


Random Glucose  80 mg/dL ()   12/28/17  07:00    


 


Calcium  8.7 mg/dL (8.4-10.5)   12/28/17  07:00    


 


Phosphorus  3.2 mg/dL (2.5-4.5)   12/28/17  07:00    


 


Magnesium  2.0 mg/dL (1.7-2.2)   12/28/17  07:00    


 


Total Bilirubin  0.6 mg/dL (0.2-1.3)   12/28/17  07:00    


 


AST  166 U/L (17-59)  H D 12/28/17  07:00    


 


ALT  132 U/L (7-56)  H  12/28/17  07:00    


 


Alkaline Phosphatase  46 U/L ()   12/28/17  07:00    


 


Lactate Dehydrogenase  661 U/L (333-699)   12/26/17  23:54    


 


Total Creatine Kinase  143 U/L ()   12/27/17  05:05    


 


Troponin I  < 0.01 ng/mL  12/26/17  23:54    


 


Total Protein  6.7 g/dL (5.8-8.3)   12/28/17  07:00    


 


Albumin  3.9 g/dL (3.0-4.8)   12/28/17  07:00    


 


Globulin  2.8 gm/dL  12/28/17  07:00    


 


Albumin/Globulin Ratio  1.4  (1.1-1.8)   12/28/17  07:00    


 


Amylase  68 U/L ()   12/27/17  05:05    


 


Lipase  167 U/L ()   12/27/17  05:05    


 


Procalcitonin  0.55 NG/ML (0.19-0.49)  H  12/27/17  05:05    


 


Venous Blood Potassium  4.0 mmol/L (3.6-5.2)   12/27/17  03:03    


 


Urine Color  Yellow  (YELLOW)   12/27/17  00:11    


 


Urine Appearance  Clear  (CLEAR)   12/27/17  00:11    


 


Urine pH  8.5  (4.7-8.0)   12/27/17  00:11    


 


Ur Specific Gravity  1.015  (1.005-1.035)   12/27/17  00:11    


 


Urine Protein  100 mg/dL (<30 mg/dL)  H  12/27/17  00:11    


 


Urine Glucose (UA)  Negative mg/dL (NEGATIVE)   12/27/17  00:11    


 


Urine Ketones  Trace mg/dL (NEGATIVE)  H  12/27/17  00:11    


 


Urine Blood  Negative  (NEGATIVE)   12/27/17  00:11    


 


Urine Nitrate  Negative  (NEGATIVE)   12/27/17  00:11    


 


Urine Bilirubin  Small  (NEGATIVE)  H  12/27/17  00:11    


 


Urine Urobilinogen  2.0 E.U./dL (<1 E.U./dL)  H  12/27/17  00:11    


 


Ur Leukocyte Esterase  Negative Rony/uL (NEGATIVE)   12/27/17  00:11    


 


Urine RBC  0 - 2 /hpf (0-2)   12/27/17  00:11    


 


Urine WBC  0 - 2 /hpf (0-6)   12/27/17  00:11    


 


Ur Epithelial Cells  0 - 2 /hpf (0-5)   12/27/17  00:11    


 


Urine Opiates Screen  Negative  (NEGATIVE)   12/27/17  03:03    


 


Urine Methadone Screen  Negative  (NEGATIVE)   12/27/17  03:03    


 


Ur Barbiturates Screen  Negative  (NEGATIVE)   12/27/17  03:03    


 


Ur Phencyclidine Scrn  Negative  (NEGATIVE)   12/27/17  03:03    


 


Ur Amphetamines Screen  Negative  (NEGATIVE)   12/27/17  03:03    


 


U Benzodiazepines Scrn  Negative  (NEGATIVE)   12/27/17  03:03    


 


U Oth Cocaine Metabols  Negative  (NEGATIVE)   12/27/17  03:03    


 


U Cannabinoids Screen  Negative  (NEGATIVE)   12/27/17  03:03    


 


Alcohol, Quantitative  < 10 mg/dL (0-10)   12/26/17  23:54    


 


Influenza Typ A,B (EIA)  Pos for influenza a  (NEGATIVE)  H  12/27/17  03:03    


 


Grp A Beta Strep Ag  Negative  (NEGATIVE)   12/27/17  03:03    














- Hospital Course


Hospital Course: 





Pt is a 27 year old male with no reported past medical history who presented 

with 1 day of fever, vomiting, body aches, congestion, sore throat, and muscle 

spasms. Pt reported nausea and vomiting 12 times. Pt reports temperatures at 

home around 102-103 F. Pt states that there are many people sick around him, 

but he says the only have a cold and not the flu. In the ED, states that he 

felt very ill and started to have diffuse muscle spasms. Pt denied LOC, seizures

, or incontinence. IVF was administered and patient reports feeling better 

afterwards. In addition, pt was found to be febrile, tachycardiac and had a 

lactate of 8. Code sepsis was called. Influenza A was positive. Patient was 

started on Tamiflu and IV abx due to sepsis. Patient was admitted for sepsis 2/

2 influenza infection. ID was consulted, recommendations were appreciated. 

During hospital course, lactic acidosis resolved and cultures were negative, 

thus IV abx were stopped. Patient progressed well on Tamiflu and symptomatic 

management. Pt was able to tolerate PO intake without vomiting. Pt reported 

some diarrhea, which was likely 2/2 senna. However, c. diff was ordered, which 

was negative. Today, patient was seen and examined at bedside. Pt stated he 

felt better and complained of nasal congestion and sore throat. Pt denied any CP

, SOB, nausea, vomiting, diarrhea, abdominal pain, fever, chills, or dizziness. 

As the patient was medically stable, he was discharged. Pt was advised to 

follow up with his PMD within 1 week for repeat labs due to elevated LFT's, he 

was also advised to obtain a hepatitis panel. Patient was given 3.5 days of 

Tamiflu, which would complete a 5 day course. He was also given prescriptions 

for symptomatic management of his flu infection.





Discharge Diagnosis


1. Influenza A Infection





Discharge Medications


- Tamiflu 75 mg PO BID


- Tessalon Perles 100 mg PO TID prn 


- Robitussin 100 mg PO Q4H prn





Discharge Exam





- Head Exam


Head Exam: ATRAUMATIC, NORMOCEPHALIC





Discharge Plan





- Discharge Medications


Prescriptions: 


RX: Benzonatate [Tessalon Perles] 100 mg PO TID 10 Days  sgl


RX: guaiFENesin [Robitussin] 100 mg PO Q4H PRN 10 Days  udc


 PRN Reason: Cough And Congestion


RX: Oseltamivir [Tamiflu Cap] 75 mg PO BID #7 cap





- Follow Up Plan


Condition: STABLE


Disposition: HOME/ ROUTINE


Instructions:  Cigarette Smoking and Your Health (GEN), Influenza (DC)


Additional Instructions: 


1. Follow up with PMD within 1 week to repeat labs due to elevated liver enzymes

, would recommend obtaining hepatitis panel


2. Tamiflu - Take one pill tonight, then twice a day for 3 more days


3. Use Robitussin for congestion and Tessalon perles for cough as prescribed


4. Use motrin for pain, avoid tylenol


5. Resume home medications as prescribed


6. May resume work starting next Wednesday


7. Return to ED if symptoms worsen








<Caity Wasserman - Last Filed: 12/28/17 13:12>





Provider





- Provider


Date of Admission: 


12/27/17 01:49





Attending physician: 


Caity Wasserman MD








Hospital Course





- Lab Results


Lab Results: 


 Micro Results





12/27/17 15:00   Stool   C. difficile Antigen & Toxin A,B (M - Final


12/27/17 03:03   Throat   Group A Strep Throat Culture - Final


                            NO BETA STREP GROUP A ISOLATED.





 Most Recent Lab Values











WBC  2.6 10^3/ul (4.5-11.0)  L* D 12/28/17  07:00    


 


RBC  4.39 10^6/uL (3.5-6.1)   12/28/17  07:00    


 


Hgb  14.6 g/dL (14.0-18.0)   12/28/17  07:00    


 


Hct  43.2 % (42.0-52.0)   12/28/17  07:00    


 


MCV  98.4 fl (80.0-105.0)   12/28/17  07:00    


 


MCH  33.3 pg (25.0-35.0)   12/28/17  07:00    


 


MCHC  33.8 g/dl (31.0-37.0)   12/28/17  07:00    


 


RDW  12.2 % (11.5-14.5)   12/28/17  07:00    


 


Plt Count  127 10^3/uL (120.0-450.0)   12/28/17  07:00    


 


MPV  10.7 fl (7.0-11.0)   12/28/17  07:00    


 


Gran %  33.1 % (50.0-68.0)  L  12/28/17  07:00    


 


Lymph % (Auto)  39.5 % (22.0-35.0)  H  12/28/17  07:00    


 


Mono % (Auto)  23.2 % (1.0-6.0)  H  12/28/17  07:00    


 


Eos % (Auto)  2.7 % (1.5-5.0)   12/28/17  07:00    


 


Baso % (Auto)  1.5 % (0.0-3.0)   12/28/17  07:00    


 


Gran #  0.87  (1.4-6.5)  L  12/28/17  07:00    


 


Lymph #  1.0  (1.2-3.4)  L  12/28/17  07:00    


 


Mono #  0.6  (0.1-0.6)   12/28/17  07:00    


 


Eos #  0.1  (0.0-0.7)   12/28/17  07:00    


 


Baso #  0.04 K/mm3 (0.0-2.0)   12/28/17  07:00    


 


Neutrophils % (Manual)  36 % (50.0-70.0)  L  12/28/17  07:00    


 


Band Neutrophils %  1 % (0-2)   12/28/17  07:00    


 


Lymphocytes % (Manual)  41 % (22.0-35.0)  H  12/28/17  07:00    


 


Atypical Lymphs %  3 % (0.0-0.0)  H  12/28/17  07:00    


 


Monocytes % (Manual)  16 % (1.0-6.0)  H  12/28/17  07:00    


 


Eosinophils % (Manual)  3 % (0.0-3.0)   12/28/17  07:00    


 


Platelet Evaluation  Low  (NORMAL)   12/28/17  07:00    


 


PT  12.3 SECONDS (9.4-12.5)   12/26/17  23:54    


 


INR  1.13  (0.93-1.08)  H  12/26/17  23:54    


 


APTT  28.2 Seconds (25.1-36.5)   12/26/17  23:54    


 


pO2  48 mm/Hg (30-55)   12/27/17  03:03    


 


VBG pH  7.40  (7.32-7.43)   12/27/17  03:03    


 


VBG pCO2  44.0  (40-60)   12/27/17  03:03    


 


VBG HCO3  27.3 mmol/l (21-28)   12/27/17  03:03    


 


VBG Total CO2  28.7 mmol.L (22-28)  H  12/27/17  03:03    


 


VBG O2 Sat (Calc)  88.8 % (40-65)  H  12/27/17  03:03    


 


VBG Base Excess  2.0 mmol/L (0.0-2.0)   12/27/17  03:03    


 


VBG Potassium  4.0 mmol/L (3.6-5.2)   12/27/17  03:03    


 


Sodium  137.0 mmol/L (132-148)   12/27/17  03:03    


 


Chloride  103.0 mmol/L ()   12/27/17  03:03    


 


Glucose  108 mg/dl ()   12/27/17  03:03    


 


Lactate  1.0 mmol/L (0.7-2.1)   12/27/17  03:03    


 


FiO2  21.0 %  12/27/17  03:03    


 


Sodium  139 mmol/L (132-148)   12/28/17  07:00    


 


Potassium  3.7 mmol/L (3.6-5.0)   12/28/17  07:00    


 


Chloride  105 mmol/L ()   12/28/17  07:00    


 


Carbon Dioxide  26 mmol/L (21-33)   12/28/17  07:00    


 


Anion Gap  12  (10-20)   12/28/17  07:00    


 


BUN  5 mg/dL (7-21)  L  12/28/17  07:00    


 


Creatinine  0.8 mg/dl (0.8-1.5)   12/28/17  07:00    


 


Est GFR ( Amer)  > 60   12/28/17  07:00    


 


Est GFR (Non-Af Amer)  > 60   12/28/17  07:00    


 


Random Glucose  80 mg/dL ()   12/28/17  07:00    


 


Calcium  8.7 mg/dL (8.4-10.5)   12/28/17  07:00    


 


Phosphorus  3.2 mg/dL (2.5-4.5)   12/28/17  07:00    


 


Magnesium  2.0 mg/dL (1.7-2.2)   12/28/17  07:00    


 


Total Bilirubin  0.6 mg/dL (0.2-1.3)   12/28/17  07:00    


 


AST  166 U/L (17-59)  H D 12/28/17  07:00    


 


ALT  132 U/L (7-56)  H  12/28/17  07:00    


 


Alkaline Phosphatase  46 U/L ()   12/28/17  07:00    


 


Lactate Dehydrogenase  661 U/L (333-699)   12/26/17  23:54    


 


Total Creatine Kinase  143 U/L ()   12/27/17  05:05    


 


Troponin I  < 0.01 ng/mL  12/26/17  23:54    


 


Total Protein  6.7 g/dL (5.8-8.3)   12/28/17  07:00    


 


Albumin  3.9 g/dL (3.0-4.8)   12/28/17  07:00    


 


Globulin  2.8 gm/dL  12/28/17  07:00    


 


Albumin/Globulin Ratio  1.4  (1.1-1.8)   12/28/17  07:00    


 


Amylase  68 U/L ()   12/27/17  05:05    


 


Lipase  167 U/L ()   12/27/17  05:05    


 


Procalcitonin  0.55 NG/ML (0.19-0.49)  H  12/27/17  05:05    


 


Venous Blood Potassium  4.0 mmol/L (3.6-5.2)   12/27/17  03:03    


 


Urine Color  Yellow  (YELLOW)   12/27/17  00:11    


 


Urine Appearance  Clear  (CLEAR)   12/27/17  00:11    


 


Urine pH  8.5  (4.7-8.0)   12/27/17  00:11    


 


Ur Specific Gravity  1.015  (1.005-1.035)   12/27/17  00:11    


 


Urine Protein  100 mg/dL (<30 mg/dL)  H  12/27/17  00:11    


 


Urine Glucose (UA)  Negative mg/dL (NEGATIVE)   12/27/17  00:11    


 


Urine Ketones  Trace mg/dL (NEGATIVE)  H  12/27/17  00:11    


 


Urine Blood  Negative  (NEGATIVE)   12/27/17  00:11    


 


Urine Nitrate  Negative  (NEGATIVE)   12/27/17  00:11    


 


Urine Bilirubin  Small  (NEGATIVE)  H  12/27/17  00:11    


 


Urine Urobilinogen  2.0 E.U./dL (<1 E.U./dL)  H  12/27/17  00:11    


 


Ur Leukocyte Esterase  Negative Rony/uL (NEGATIVE)   12/27/17  00:11    


 


Urine RBC  0 - 2 /hpf (0-2)   12/27/17  00:11    


 


Urine WBC  0 - 2 /hpf (0-6)   12/27/17  00:11    


 


Ur Epithelial Cells  0 - 2 /hpf (0-5)   12/27/17  00:11    


 


Urine Opiates Screen  Negative  (NEGATIVE)   12/27/17  03:03    


 


Urine Methadone Screen  Negative  (NEGATIVE)   12/27/17  03:03    


 


Ur Barbiturates Screen  Negative  (NEGATIVE)   12/27/17  03:03    


 


Ur Phencyclidine Scrn  Negative  (NEGATIVE)   12/27/17  03:03    


 


Ur Amphetamines Screen  Negative  (NEGATIVE)   12/27/17  03:03    


 


U Benzodiazepines Scrn  Negative  (NEGATIVE)   12/27/17  03:03    


 


U Oth Cocaine Metabols  Negative  (NEGATIVE)   12/27/17  03:03    


 


U Cannabinoids Screen  Negative  (NEGATIVE)   12/27/17  03:03    


 


Alcohol, Quantitative  < 10 mg/dL (0-10)   12/26/17  23:54    


 


Influenza Typ A,B (EIA)  Pos for influenza a  (NEGATIVE)  H  12/27/17  03:03    


 


Grp A Beta Strep Ag  Negative  (NEGATIVE)   12/27/17  03:03    














Attending/Attestation





- Attestation


I have personally seen and examined this patient.: Yes


I have fully participated in the care of the patient.: Yes


I have reviewed all pertinent clinical information, including history, physical 

exam and plan: Yes


Notes (Text): 


I have seen and examined the patient at bedside. Agree with the above note with 

the following additions/ exceptions: Briefly this is 27 year old male who came 

for sepsis secondary to influenza A infection. Patient feels well and denies 

nausea, vomiting, diarrhea, abdominal pain. Still has mild cough and sore 

throat. Patient is not hypoxic and is requesting to go home. Continue tamiflu 

to continue 5 days. Upon discharge patient will [facility].


Dr Caity Wasserman

## 2018-05-18 ENCOUNTER — HOSPITAL ENCOUNTER (INPATIENT)
Dept: HOSPITAL 42 - ED | Age: 28
LOS: 11 days | Discharge: LEFT BEFORE DISCHARGE | DRG: 871 | End: 2018-05-29
Attending: INTERNAL MEDICINE | Admitting: INTERNAL MEDICINE
Payer: OTHER GOVERNMENT

## 2018-05-18 VITALS — BODY MASS INDEX: 29 KG/M2

## 2018-05-18 DIAGNOSIS — E78.5: ICD-10-CM

## 2018-05-18 DIAGNOSIS — I10: ICD-10-CM

## 2018-05-18 DIAGNOSIS — D69.59: ICD-10-CM

## 2018-05-18 DIAGNOSIS — D64.9: ICD-10-CM

## 2018-05-18 DIAGNOSIS — K85.90: ICD-10-CM

## 2018-05-18 DIAGNOSIS — F17.200: ICD-10-CM

## 2018-05-18 DIAGNOSIS — K29.70: ICD-10-CM

## 2018-05-18 DIAGNOSIS — K21.9: ICD-10-CM

## 2018-05-18 DIAGNOSIS — K76.0: ICD-10-CM

## 2018-05-18 DIAGNOSIS — Z83.79: ICD-10-CM

## 2018-05-18 DIAGNOSIS — K86.1: ICD-10-CM

## 2018-05-18 DIAGNOSIS — N28.9: ICD-10-CM

## 2018-05-18 DIAGNOSIS — A41.9: Primary | ICD-10-CM

## 2018-05-18 DIAGNOSIS — E87.6: ICD-10-CM

## 2018-05-18 DIAGNOSIS — E83.51: ICD-10-CM

## 2018-05-18 DIAGNOSIS — K31.1: ICD-10-CM

## 2018-05-18 DIAGNOSIS — R31.9: ICD-10-CM

## 2018-05-18 DIAGNOSIS — E88.81: ICD-10-CM

## 2018-05-18 DIAGNOSIS — K29.80: ICD-10-CM

## 2018-05-18 DIAGNOSIS — E66.9: ICD-10-CM

## 2018-05-18 DIAGNOSIS — E87.8: ICD-10-CM

## 2018-05-18 DIAGNOSIS — E78.1: ICD-10-CM

## 2018-05-18 LAB
ALBUMIN SERPL-MCNC: 4.3 G/DL (ref 3–4.8)
ALBUMIN/GLOB SERPL: 1.3 {RATIO} (ref 1.1–1.8)
ALT SERPL-CCNC: 119 U/L (ref 7–56)
APPEARANCE UR: CLEAR
AST SERPL-CCNC: 195 U/L (ref 17–59)
BASE EXCESS BLDV CALC-SCNC: 0.9 MMOL/L (ref 0–2)
BASE EXCESS BLDV CALC-SCNC: 2.3 MMOL/L (ref 0–2)
BASOPHILS # BLD AUTO: 0.09 K/MM3 (ref 0–2)
BASOPHILS NFR BLD: 1.6 % (ref 0–3)
BILIRUB UR-MCNC: NEGATIVE MG/DL
BUN SERPL-MCNC: 11 MG/DL (ref 7–21)
CALCIUM SERPL-MCNC: 9.1 MG/DL (ref 8.4–10.5)
COLOR UR: YELLOW
EOSINOPHIL # BLD: 0.2 10*3/UL (ref 0–0.7)
EOSINOPHIL NFR BLD: 3.3 % (ref 1.5–5)
ERYTHROCYTE [DISTWIDTH] IN BLOOD BY AUTOMATED COUNT: 12.5 % (ref 11.5–14.5)
GFR NON-AFRICAN AMERICAN: > 60
GLUCOSE UR STRIP-MCNC: NEGATIVE MG/DL
GRANULOCYTES # BLD: 1.88 10*3/UL (ref 1.4–6.5)
GRANULOCYTES NFR BLD: 32.3 % (ref 50–68)
HDLC SERPL-MCNC: 29 MG/DL (ref 29–60)
HGB BLD-MCNC: 18 G/DL (ref 14–18)
INR PPP: 1.17 (ref 0.93–1.08)
LDLC SERPL-MCNC: < 30 MG/DL (ref 0–129)
LEUKOCYTE ESTERASE UR-ACNC: NEGATIVE LEU/UL
LIPASE SERPL-CCNC: 379 U/L (ref 23–300)
LYMPHOCYTES # BLD: 2.9 10*3/UL (ref 1.2–3.4)
LYMPHOCYTES NFR BLD AUTO: 49.7 % (ref 22–35)
MCH RBC QN AUTO: 34 PG (ref 25–35)
MCHC RBC AUTO-ENTMCNC: 37 G/DL (ref 31–37)
MCV RBC AUTO: 91.9 FL (ref 80–105)
MONOCYTES # BLD AUTO: 0.8 10*3/UL (ref 0.1–0.6)
MONOCYTES NFR BLD: 13.1 % (ref 1–6)
PH BLDV: 7.38 [PH] (ref 7.32–7.43)
PH BLDV: 7.51 [PH] (ref 7.32–7.43)
PH UR STRIP: 7.5 [PH] (ref 4.7–8)
PLATELET # BLD: 172 10^3/UL (ref 120–450)
PMV BLD AUTO: 11 FL (ref 7–11)
PROT UR STRIP-MCNC: NEGATIVE MG/DL
PROTHROMBIN TIME: 13.5 SECONDS (ref 9.4–12.5)
RBC # BLD AUTO: 5.29 10^6/UL (ref 3.5–6.1)
RBC # UR STRIP: NEGATIVE /UL
SP GR UR STRIP: 1.01 (ref 1–1.03)
UROBILINOGEN UR STRIP-ACNC: 0.2 E.U./DL
VENOUS BLOOD FIO2: 21 %
VENOUS BLOOD FIO2: 21 %
VENOUS BLOOD GAS PCO2: 31 (ref 40–60)
VENOUS BLOOD GAS PCO2: 45 (ref 40–60)
VENOUS BLOOD GAS PO2: 33 MM/HG (ref 30–55)
VENOUS BLOOD GAS PO2: 46 MM/HG (ref 30–55)
WBC # BLD AUTO: 5.8 10^3/UL (ref 4.5–11)

## 2018-05-18 RX ADMIN — HYDROMORPHONE HYDROCHLORIDE PRN MG: 1 INJECTION, SOLUTION INTRAMUSCULAR; INTRAVENOUS; SUBCUTANEOUS at 20:45

## 2018-05-18 RX ADMIN — HYDROMORPHONE HYDROCHLORIDE PRN MG: 1 INJECTION, SOLUTION INTRAMUSCULAR; INTRAVENOUS; SUBCUTANEOUS at 22:46

## 2018-05-18 NOTE — ED PDOC
Arrival/HPI





- General


Chief Complaint: GI Problem


Time Seen by Provider: 05/18/18 12:26


Historian: Patient





- History of Present Illness


Narrative History of Present Illness (Text): 


05/18/18 12:59


A 28 year old male, [place] war , whose past medical history includes 

chronic abdominal pain, first episode with horrible muscle cramps (12/2017), 

which was blamed on Influenza A, abdominal pain again in 04/2018, was admitted 

to the ICU, had colonoscopy and endoscopy done at [facility] to test H. 

pylori, presents to the emergency department complaining of periumbilical 

abdominal pain. Patient reports prior endoscopy and colonoscopy showed swelling 

of intestines. Patient was given Antibiotics and Pepcid. Reports Antibiotics 

will finish in a few days. Reports another endoscopy and colonoscopy will be 

done two months after antibiotics given. Patient denies any other complaints at 

this time.





Symptom Onset: Sudden


Symptom Course: Unchanged


Activities at Onset: Rest


Context: Home





Past Medical History





- Provider Review


Nursing Documentation Reviewed: Yes





- Infectious Disease


Hx of Infectious Diseases: None





- Tetanus Immunization


Tetanus Immunization: Unknown





- Past Medical History


Past Medical History: No Previous





- Cardiac


Hx Cardiac Disorders: No





- Pulmonary


Hx Respiratory Disorders: No





- Neurological


Hx Neurological Disorder: No





- HEENT


Hx HEENT Disorder: No





- Renal


Hx Renal Disorder: No





- Endocrine/Metabolic


Hx Endocrine Disorders: No





- Hematological/Oncological


Hx Blood Disorders: No





- Integumentary


Hx Dermatological Disorder: No





- Musculoskeletal/Rheumatological


Hx Musculoskeletal Disorders: No





- Gastrointestinal


Hx Gastrointestinal Disorders: No





- Genitourinary/Gynecological


Hx Genitourinary Disorders: No





- Psychiatric


Hx Psychophysiologic Disorder: No


Hx Depression: No


Hx Emotional Abuse: No


Hx Physical Abuse: No


Hx Substance Use: No





- Past Surgical History


Past Surgical History: No Previous





- Anesthesia


Hx Anesthesia: No


Hx Anesthesia Reactions: No


Hx Malignant Hyperthermia: No





- Suicidal Assessment


Feels Threatened In Home Enviroment: No





Family/Social History





- Physician Review


Nursing Documentation Reviewed: Yes


Family/Social History: No Known Family HX


Smoking Status: Light Smoker < 10 Cigarettes Daily


Hx Alcohol Use: No


Hx Substance Use: No


Hx Substance Use Treatment: No





Allergies/Home Meds


Allergies/Adverse Reactions: 


Allergies





No Known Allergies Allergy (Verified 05/18/18 12:36)


 








Home Medications: 


 Home Meds











 Medication  Instructions  Recorded  Confirmed


 


Amitriptyline [Elavil] 1 tab PO DAILY 05/18/18 05/18/18


 


Ondansetron HCl [Zofran] 1 tab PO DAILY PRN 05/18/18 05/18/18


 


Pantoprazole Sodium [Protonix] 1 tab PO DAILY 05/18/18 05/18/18














Review of Systems





- Physician Review


All systems were reviewed & negative as marked: Yes





- Review of Systems


Constitutional: absent: Fevers


Gastrointestinal: Abdominal Pain





Physical Exam


Vital Signs Reviewed: Yes


Vital Signs











  Temp Pulse Resp BP Pulse Ox


 


 05/18/18 18:26   88  18   99


 


 05/18/18 17:13   79  18  148/85  98


 


 05/18/18 15:09   86  18  155/74 H  98


 


 05/18/18 14:01   98 H  18  158/89 H  98


 


 05/18/18 12:32  98.8 F  102 H  16  163/96 H  99











Temperature: Afebrile


Blood Pressure: Hypertensive


Pulse: Tachycardic


Respiratory Rate: Normal


Appearance: Positive for: Well-Appearing, Non-Toxic, Other (tearful)


Pain Distress: None


Mental Status: Positive for: Alert and Oriented X 3





- Systems Exam


Head: Present: Atraumatic, Normocephalic


Pupils: Present: PERRL


Extroacular Muscles: Present: EOMI


Conjunctiva: Present: Normal


Mouth: Present: Moist Mucous Membranes


Neck: Present: Normal Range of Motion


Respiratory/Chest: Present: Clear to Auscultation, Good Air Exchange.  No: 

Respiratory Distress, Accessory Muscle Use


Cardiovascular: Present: Regular Rate and Rhythm, Normal S1, S2.  No: Murmurs


Abdomen: Present: Tenderness (diffuse tenderness in both lower quadrants), 

Other (periumbilical abdominal pain).  No: Peritoneal Signs


Back: Present: Normal Inspection


Upper Extremity: Present: Normal Inspection.  No: Cyanosis, Edema


Lower Extremity: Present: Normal Inspection.  No: Edema


Neurological: Present: GCS=15, CN II-XII Intact, Speech Normal


Skin: Present: Warm, Dry, Normal Color.  No: Rashes


Psychiatric: Present: Alert, Oriented x 3, Normal Insight, Normal Concentration





Medical Decision Making


ED Course and Treatment: 


05/18/18 12:57


Impression:


A 28 year old male with periumbilical abdominal pain. 





Plan:


-- CT abd/pelvis


-- Urinalysis


-- labs


-- Dilaudid, Toradol, Zofran, IV fluids


-- Reassess and disposition





Prior Visits:


Notes and results from previous visits were reviewed. Patient was last seen in 

the emergency department on 12/26/17 for evaluation of nausea, vomiting and 

generalized body aches. Patient was admitted for sepsis.





Progress Notes:





05/18/18 15:20


Spoke with Dr. Juarez, Radiologist, who suggests CT shows patient has 

duodenitis or pancreatitis, CT shows edema and swelling to pancreas and 

duodenum.





05/18/18 15:24


CT Abdomen and Pelvis with contrast


Creator : Eliseo Mata MD


FINDINGS:


LOWER THORAX: Unremarkable. 


LIVER: There is fatty infiltration of the liver. 


GALLBLADDER AND BILE DUCTS: Unremarkable. 


PANCREAS: There is a small amount of edema and inflammation in the fat planes 

between the uncinate process of the pancreas and the adjacent duodenum.  

Findings are consistent with pancreatitis versus duodenitis.


The findings were discussed Dr. Acosta at 3:20 p.m.


SPLEEN: Unremarkable. 


ADRENALS: Unremarkable. No mass. 


KIDNEYS AND URETERS: Unremarkable. No hydronephrosis. No solid mass. 


VASCULATURE: Unremarkable. No aortic aneurysm. 


BOWEL: Unremarkable. No obstruction. No gross mural thickening. 


APPENDIX: Normal appendix. 


PERITONEUM: Unremarkable. No free fluid. No free air. 


LYMPH NODES: Unremarkable. No enlarged lymph nodes. 


BLADDER: Unremarkable. 


REPRODUCTIVE: Unremarkable. 


BONES: No acute fracture. 


IMPRESSION: There is a small amount of edema and inflammation in the fat planes 

between the uncinate process of the pancreas and the adjacent duodenum.  

Findings are consistent with pancreatitis versus duodenitis. Fatty infiltration 

of the liver.





05/18/18 16:12


US abdomen


Creator : Trevin Burdick


IMPRESSION:


Hepatic steatosis.  Otherwise unremarkable study.


Limitations of the current examination:


Nondiagnostic study of the pancreas.











- Lab Interpretations


Microbiology Results: 


Microbiology Results





05/18/18 13:20   Blood-Venous   Blood Culture - Final


                            NO GROWTH AFTER 5 DAYS


05/18/18 13:20   Blood-Venous   Gram Stain - Final


                            TEST NOT PERFORMED


05/18/18 13:20   Blood-Venous   Blood Culture - Final


                            NO GROWTH AFTER 5 DAYS


05/18/18 13:20   Blood-Venous   Gram Stain - Final


                            TEST NOT PERFORMED








Lab Results: 








 05/18/18 13:05 





 05/18/18 13:05 





 Lab Results





05/18/18 13:57: Urine Color Yellow, Urine Appearance Clear, Urine pH 7.5, Ur 

Specific Gravity 1.010, Urine Protein Negative, Urine Glucose (UA) Negative, 

Urine Ketones Negative, Urine Blood Negative, Urine Nitrate Negative, Urine 

Bilirubin Negative, Urine Urobilinogen 0.2, Ur Leukocyte Esterase Negative


05/18/18 13:35: pO2 46, VBG pH 7.51 H, VBG pCO2 31.0 L, VBG HCO3 24.7, VBG 

Total CO2 25.7, VBG O2 Sat (Calc) 90.1 H, VBG Base Excess 2.3 H, VBG Potassium 

3.3 L, Glucose 96, Lactate 3.6 H, FiO2 21.0, Sodium 138.0, Chloride 101.0, 

Venous Blood Potassium 3.3 L


05/18/18 13:05: Lactate Dehydrogenase 751 H, Triglycerides 868 H, Cholesterol 

269 H, LDL Cholesterol Direct < 30, HDL Cholesterol 29


05/18/18 13:05: C-React Prot High Sens 0.80 L


05/18/18 13:05: Sodium 142, Potassium 3.3 L, Chloride 102, Carbon Dioxide 23, 

Anion Gap 20, BUN 11, Creatinine 1.1, Est GFR (African Amer) > 60, Est GFR (Non-

Af Amer) > 60, Random Glucose 99, Calcium 9.1, Total Bilirubin 1.7 H,  H

,  H, Alkaline Phosphatase 83, Total Protein 7.7, Albumin 4.3, Globulin 

3.4, Albumin/Globulin Ratio 1.3, Lipase 379 H


05/18/18 13:05: PT 13.5 H, INR 1.17 H


05/18/18 13:05: WBC 5.8  D, RBC 5.29, Hgb 18.0  D, Hct 48.6, MCV 91.9  D, MCH 

34.0, MCHC 37.0, RDW 12.5, Plt Count 172, MPV 11.0, Gran % 32.3 L, Lymph % (Auto

) 49.7 H, Mono % (Auto) 13.1 H, Eos % (Auto) 3.3, Baso % (Auto) 1.6, Gran # 1.88

, Lymph # (Auto) 2.9, Mono # (Auto) 0.8 H, Eos # (Auto) 0.2, Baso # (Auto) 0.09

, ESR 1











- RAD Interpretation


Radiology Orders: 








05/18/18 12:49


ABD PELVIS PO & IV CONTRAST [CT] Stat 





05/18/18 14:37


ABDOMEN COMPLETE [US] Stat 














- Medication Orders


Current Medication Orders: 








Acetaminophen (Tylenol 325mg Tab)  650 mg PO Q4H PRN


   PRN Reason: Temperature


   Last Admin: 05/21/18 22:00  Dose: 650 mg





MAR Pain/Vitals


 Document     05/21/18 22:00  MB  (Rec: 05/21/18 22:00  MB  [id number][place]


     Pain Reassessment


      Is This A Pain ReAssessment?               No


     Vitals


      Temperature (97.6 F-99.6 F)                101.7 F


      Temperature Source                         Oral


Re-Assess: MAR Pain/Vitals


 Document     05/21/18 23:00  MB  (Rec: 05/22/18 01:56  MB  [id number])


     Pain Reassessment


      Is This A Pain ReAssessment?               No


     Vitals


      Temperature (97.6 F-99.6 F)                98.5 F


      Temperature Source                         Oral





Clonidine HCl (Catapres-Tts3 0.3 Mg/24 Hr)  1 patch TD Q7D@1000 [facility]


   Last Admin: 05/20/18 17:19  Dose: 1 patch





MAR Pulse and Blood Pressure


 Document     05/20/18 17:19  AJ  (Rec: 05/20/18 17:20  AJ  [facility]-[id number])


     Pulse


      Pulse Rate (60-90)                         72


     Blood Pressure


      Blood Pressure (100//90)             150/99


MAR Transdermal Patch Site


 Document     05/20/18 17:19  AJ  (Rec: 05/20/18 17:20  AJ  [facility]-[id number])


     Transdermal Patch Site


      Transdermal Patch Site                     Left Upper Chest





Docusate Sodium (Colace)  100 mg PO DAILY [facility]


   Last Admin: 05/23/18 09:28  Dose: 100 mg





Hydromorphone HCl (Dilaudid)  1 mg IVP Q2H PRN


   PRN Reason: Pain, severe (8-10)


   Last Admin: 05/23/18 12:04  Dose: 1 mg





MAR Pain Assessment


 Document     05/23/18 12:04  EP  (Rec: 05/23/18 12:05  EP  [facility]-[id number])


     Pain Reassessment


      Is this a pain reassessment?               No


     Sleep


      Is patient sleeping during reassessment?   No


     Presence of Pain


      Presence of Pain                           Yes


     Location


      Pain Location Body Site                    Abdomen


     Description


      Description                                Intermittent


      Intensity of Pain at present               9


      Pain Behavior                              Restlessness


IVP Administration


 Document     05/23/18 12:04  EP  (Rec: 05/23/18 12:05  EP  [facility]-[id number])


     Charges for Administration


      # of IVP Administrations                   1





Piperacillin Sod/Tazobactam Sod (Zosyn 3.375 In Ns 100ml)  100 mls @ 200 mls/hr 

IVPB Q6 [facility]


   PRN Reason: Protocol


   Stop: 05/29/18 12:01


   Last Admin: 05/23/18 12:05  Dose: 200 mls/hr





eMAR Start Stop


 Document     05/23/18 12:05  EP  (Rec: 05/23/18 12:05  EP  [facility]-[id number])


     Intravenous Solution


      Start Date                                 05/23/18


      Start Time                                 12:05


      End Date                                   05/23/18


      End time                                   12:35


      Total Infusion Time                        30





Lactated Ringer's (Lactated Ringer's)  1,000 mls @ 20 mls/hr IV .Q24H [facility]


Amino Acids/Electrolytes/Dextrose (Clinimix 4.25/5 % "E" (2000 Ml))  2,000 mls 

@ 83.333 mls/hr IV .Q24H [facility]


Ondansetron HCl (Zofran Inj)  4 mg IVP Q6H PRN


   PRN Reason: Nausea/Vomiting


   Last Admin: 05/22/18 19:45  Dose: 4 mg





IVP Administration


 Document     05/22/18 19:45  MB  (Rec: 05/22/18 19:45  MB  [id number])


     Charges for Administration


      # of IVP Administrations                   1





Pantoprazole Sodium (Protonix Inj)  40 mg IVP Q12 [facility]


   Last Admin: 05/23/18 09:28  Dose: 40 mg





IVP Administration


 Document     05/23/18 09:28  EP  (Rec: 05/23/18 09:28  EP  [facility][id number])


     Charges for Administration


      # of IVP Administrations                   1








Discontinued Medications





Amitriptyline HCl (Elavil)  10 mg PO DAILY [facility]


   Last Admin: 05/23/18 09:42 Dose:  Not Given


   Non-Admin Reason: Patient Refused





Clonidine HCl (Catapres Tts1 0.1 Mg/24 Hr)  1 patch TD Q7D@0630 [facility]


   Last Admin: 05/19/18 06:53  Dose: 1 patch





MAR Transdermal Patch Site


 Document     05/19/18 06:53  CMA  (Rec: 05/19/18 06:53  CMA  [id number])


     Transdermal Patch Site


      Transdermal Patch Site                     Right Upper Chest





Clonidine HCl (Catapres-Tts2 0.2 Mg/24 Hr)  1 patch TD Q7D@1000 [facility]


   Last Admin: 05/19/18 17:43  Dose: 1 patch





MAR Pulse and Blood Pressure


 Document     05/19/18 17:43  LENORE  (Rec: 05/19/18 17:44  LENORE  [id number])


     Pulse


      Pulse Rate (60-90)                         90


     Blood Pressure


      Blood Pressure (100//90)             147/100


MAR Transdermal Patch Site


 Document     05/19/18 17:43  LENORE  (Rec: 05/19/18 17:44  LENORE  [id number])


     Transdermal Patch Site


      Transdermal Patch Site                     Left Upper Chest





Diphenhydramine HCl (Benadryl)  25 mg IVP Q4H PRN


   PRN Reason: Allergy symptoms


   Last Admin: 05/19/18 04:01  Dose: 25 mg





IVP Administration


 Document     05/19/18 04:01  CMA  (Rec: 05/19/18 04:01  [facility]-[id number])


     Charges for Administration


      # of IVP Administrations                   1





Hydromorphone HCl (Dilaudid)  2 mg IVP STAT STA


   Stop: 05/18/18 12:50


   Last Admin: 05/18/18 13:03  Dose: 2 mg





MAR Pain Assessment


 Document     05/18/18 13:03  GMD  (Rec: 05/18/18 13:03  GMD  [id number])


     Pain Reassessment


      Is this a pain reassessment?               No


IVP Administration


 Document     05/18/18 13:03  GMD  (Rec: 05/18/18 13:03  GMD  [id number])


     Charges for Administration


      # of IVP Administrations                   1





Hydromorphone HCl (Dilaudid)  2 mg IVP STAT STA


   Stop: 05/18/18 14:14


   Last Admin: 05/18/18 14:17  Dose: 2 mg





MAR Pain Assessment


 Document     05/18/18 14:17  GMD  (Rec: 05/18/18 14:17  GMD  [id number])


     Pain Reassessment


      Is this a pain reassessment?               No


     Presence of Pain


      Presence of Pain                           Yes


IVP Administration


 Document     05/18/18 14:17  GMD  (Rec: 05/18/18 14:17  GMD  [id number])


     Charges for Administration


      # of IVP Administrations                   1





Hydromorphone HCl (Dilaudid)  2 mg IVP STAT STA


   Stop: 05/18/18 16:18


   Last Admin: 05/18/18 16:35  Dose: 2 mg





MAR Pain Assessment


 Document     05/18/18 16:35  GMD  (Rec: 05/18/18 16:35  GMD  [id number])


     Pain Reassessment


      Is this a pain reassessment?               No


     Presence of Pain


      Presence of Pain                           Yes


IVP Administration


 Document     05/18/18 16:35  GMD  (Rec: 05/18/18 16:35  GMD  [id number])


     Charges for Administration


      # of IVP Administrations                   1





Hydromorphone HCl (Dilaudid)  0.5 mg IVP Q4H PRN


   PRN Reason: Pain, Mild (1-3)


   Last Admin: 05/18/18 18:22  Dose: 0.5 mg





MAR Pain Assessment


 Document     05/18/18 18:22  GMD  (Rec: 05/18/18 18:23  GMD  [id number])


     Pain Reassessment


      Is this a pain reassessment?               Yes


     Sleep


      Is patient sleeping during reassessment?   No


     Presence of Pain


      Presence of Pain                           Yes


     Pain Scale Used


      Pain Scale Used                            Numeric


     Location


      Pain Location Body Site                    Abdomen


     Description


      Intensity of Pain at present               7


IVP Administration


 Document     05/18/18 18:22  GMD  (Rec: 05/18/18 18:23  GMD  [id number])


     Charges for Administration


      # of IVP Administrations                   1





Hydromorphone HCl (Dilaudid)  0.5 mg IVP Q2H PRN


   PRN Reason: Pain, severe (8-10)


   Last Admin: 05/19/18 12:21  Dose: 0.5 mg





MAR Pain Assessment


 Document     05/19/18 12:21  LENORE  (Rec: 05/19/18 12:22  LENORE  [facility][id number])


     Pain Reassessment


      Is this a pain reassessment?               No


     Sleep


      Is patient sleeping during reassessment?   No


     Presence of Pain


      Presence of Pain                           Yes


     Pain Scale Used


      Pain Scale Used                            Numeric


     Location


      Left, Right or Bilateral                   Bilateral


      Upper or Lower                             Upper


      Pain Location Body Site                    Abdomen


                                                 Back


     Description


      Description                                Radiating


      Intensity of Pain at present               10


      Pain Behavior                              Guarding


                                                 Restlessness


                                                 Facial Grimacing


      Alleviating Factors/Management             Medication


       Techniques                                


IVP Administration


 Document     05/19/18 12:21  LENORE  (Rec: 05/19/18 12:22  [facility]-[id number])


     Charges for Administration


      # of IVP Administrations                   1


Re-Assess: MAR Pain Assessment


 Document     05/19/18 13:21  LENORE  (Rec: 05/19/18 14:36  [facility]-[id number])


     Pain Reassessment


      Is this a pain reassessment?               Yes


     Sleep


      Is patient sleeping during reassessment?   No


     Presence of Pain


      Presence of Pain                           Yes


     Pain Scale Used


      Pain Scale Used                            Numeric


     Location


      Left, Right or Bilateral                   Bilateral


      Upper or Lower                             Upper


      Pain Location Body Site                    Abdomen


                                                 Back


     Description


      Description                                Radiating


      Intensity of Pain at present               10


      Pain Behavior                              Moaning


                                                 Guarding


                                                 Irritability


                                                 Withdrawal from Touch


                                                 Restlessness


                                                 Facial Grimacing


      Alleviating Factors/Management             Medication


       Techniques                                


      Pain not relieved and LIP/MD was           Yes


       notified                                  





Sodium Chloride (Sodium Chloride 0.9%)  1,000 mls @ 999 mls/hr IV .Q1H1M [facility]


   Stop: 05/18/18 13:49


   Last Admin: 05/18/18 13:08  Dose: 999 mls/hr





eMAR Start Stop


 Document     05/18/18 13:08  GMD  (Rec: 05/18/18 13:08  [id number]


     Intravenous Solution


      Start Date                                 05/18/18


      Start Time                                 13:08


      End Date                                   05/18/18


      End time                                   14:09


      Total Infusion Time                        61





Sodium Chloride (Sodium Chloride 0.9%)  1,000 mls @ 999 mls/hr IV .Q1H1M [facility]


   Stop: 05/18/18 14:58


   Last Admin: 05/18/18 14:17  Dose: 999 mls/hr





eMAR Start Stop


 Document     05/18/18 14:17  GMD  (Rec: 05/18/18 14:17  GMD  [id number])


     Intravenous Solution


      Start Date                                 05/18/18


      Start Time                                 14:17


      End Date                                   05/18/18


      End time                                   15:17


      Total Infusion Time                        60





Famotidine (Pepcid 20mg/50ml Premix)  20 mg in 50 mls @ 100 mls/hr IVPB STAT STA


   Stop: 05/18/18 16:24


   Last Admin: 05/18/18 17:29  Dose: 100 mls/hr





eMAR Start Stop


 Document     05/18/18 17:29  GMD  (Rec: 05/18/18 17:29  GMD  [id number])


     Intravenous Solution


      Start Date                                 05/18/18


      Start Time                                 17:29


      End Date                                   05/18/18


      End time                                   17:59


      Total Infusion Time                        30





Lactated Ringer's (Lactated Ringer's)  1,000 mls @ 500 mls/hr IV .Q2H [facility]


   Stop: 05/18/18 18:29


   Last Admin: 05/18/18 16:34  Dose: 500 mls/hr





eMAR Start Stop


 Document     05/18/18 16:34  GMD  (Rec: 05/18/18 16:35  GMD  [id number])


     Intravenous Solution


      Start Date                                 05/18/18


      Start Time                                 16:35


      End Date                                   05/18/18


      End time                                   18:35


      Total Infusion Time                        120





Lactated Ringer's (Lactated Ringer's)  1,000 mls @ 250 mls/hr IV .Q4H [facility]


   Last Admin: 05/19/18 00:46  Dose: 250 mls/hr





eMAR Start Stop


 Document     05/19/18 00:46  CMA  (Rec: 05/19/18 00:46  CMA  [facility][id number])


     Intravenous Solution


      Start Date                                 05/19/18


      Start Time                                 00:46





Lactated Ringer's (Lactated Ringer's)  1,000 mls @ 300 mls/hr IV .Q3H20M [facility]


   Last Admin: 05/19/18 07:49  Dose: 300 mls/hr





eMAR Start Stop


 Document     05/19/18 07:49  LENORE  (Rec: 05/19/18 07:49  LENORE  [facility]-[id number])


     Intravenous Solution


      Start Date                                 05/19/18


      Start Time                                 07:49


      End Date                                   05/19/18


      End time                                   11:09


      Total Infusion Time                        200





Magnesium Sulfate 1 gm/ (Lactated Ringer's)  1,002 mls @ 300 mls/hr IV .Q3H21M 

[facility]


Magnesium Sulfate 2 gm/ Sodium (Chloride)  104 mls @ 102 mls/hr IVPB ONCE ONE


   Stop: 05/19/18 11:11


   Last Admin: 05/19/18 10:28  Dose: 102 mls/hr





eMAR Start Stop


 Document     05/19/18 10:28  LENORE  (Rec: 05/19/18 10:29  LENORE  [id number])


     Intravenous Solution


      Start Date                                 05/19/18


      Start Time                                 10:29


      End Date                                   05/19/18


      End time                                   11:31


      Total Infusion Time                        62





Lactated Ringer's (Lactated Ringer's)  1,000 mls @ 300 mls/hr IV .Q3H20M [facility]


   Last Admin: 05/19/18 10:19  Dose: 300 mls/hr





eMAR Start Stop


 Document     05/19/18 10:19  LENORE  (Rec: 05/19/18 10:19  LENORE  [id number])


     Intravenous Solution


      Start Date                                 05/19/18


      Start Time                                 10:19


      End Date                                   05/19/18


      End time                                   13:39


      Total Infusion Time                        200





Piperacillin Sod/Tazobactam Sod (Zosyn 3.375 In Ns 100ml)  100 mls @ 200 mls/hr 

IVPB Q6 CHANCE


   PRN Reason: Protocol


   Stop: 05/19/18 18:29


   Last Admin: 05/19/18 17:51  Dose: 200 mls/hr





eMAR Start Stop


 Document     05/19/18 17:51  LENORE  (Rec: 05/19/18 17:51  LENORE  [id number][place]


     Intravenous Solution


      Start Date                                 05/19/18


      Start Time                                 17:51


      End Date                                   05/19/18


      End time                                   18:21


      Total Infusion Time                        30





Lactated Ringer's (Lactated Ringer's)  1,000 mls @ 150 mls/hr IV .Q6H40M CHANCE


   Last Admin: 05/22/18 04:09  Dose: 150 mls/hr





eMAR Start Stop


 Document     05/22/18 04:09  MB  (Rec: 05/22/18 04:10  MB  [facility][id number][place]


     Intravenous Solution


      Start Date                                 05/22/18


      Start Time                                 04:10





Piperacillin Sod/Tazobactam Sod (Zosyn 3.375 In Ns 100ml)  100 mls @ 200 mls/hr 

IVPB Q6 CHANCE


   PRN Reason: Protocol


   Stop: 05/20/18 06:29


   Last Admin: 05/20/18 05:39  Dose: 200 mls/hr





eMAR Start Stop


 Document     05/20/18 05:39  LVC  (Rec: 05/20/18 05:39  LVC  [id number])


     Intravenous Solution


      Start Date                                 05/20/18


      Start Time                                 05:39





Lactated Ringer's (Lactated Ringer's)  1,000 mls @ 100 mls/hr IV .Q10H CHANCE


   Last Admin: 05/23/18 05:54  Dose: 100 mls/hr





eMAR Start Stop


 Document     05/23/18 05:54  MB  (Rec: 05/23/18 05:54  MB  [id number])


     Intravenous Solution


      Start Date                                 05/23/18


      Start Time                                 05:54





Ketorolac Tromethamine (Toradol)  30 mg IVP STAT STA


   Stop: 05/18/18 12:50


   Last Admin: 05/18/18 13:02  Dose: 30 mg





MAR Pain Assessment


 Document     05/18/18 13:02  GMD  (Rec: 05/18/18 13:03  GMD  [id number])


     Pain Reassessment


      Is this a pain reassessment?               No


     Presence of Pain


      Presence of Pain                           Yes


IVP Administration


 Document     05/18/18 13:02  GMD  (Rec: 05/18/18 13:03  GMD  [id number])


     Charges for Administration


      # of IVP Administrations                   1





Ketorolac Tromethamine (Toradol)  30 mg IVP STAT STA


   Stop: 05/19/18 04:14


   Last Admin: 05/19/18 04:40  Dose: 30 mg





MAR Pain Assessment


 Document     05/19/18 04:40  CMA  (Rec: 05/19/18 04:41  [facility]-[id number])


     Pain Reassessment


      Is this a pain reassessment?               No


     Sleep


      Is patient sleeping during reassessment?   No


     Presence of Pain


      Presence of Pain                           Yes


     Pain Scale Used


      Pain Scale Used                            Numeric


     Location


      Pain Location Body Site                    Abdomen


     Description


      Description                                Constant


      Intensity of Pain at present               9


      Pain Behavior                              Facial Grimacing


      Alleviating Factors/Management             Medication


       Techniques                                


      Alleviating Factors                        Medication


IVP Administration


 Document     05/19/18 04:40  CMA  (Rec: 05/19/18 04:41  [facility]-[id number])


     Charges for Administration


      # of IVP Administrations                   1





Ondansetron HCl (Zofran Inj)  8 mg IVP STAT STA


   Stop: 05/18/18 12:50


   Last Admin: 05/18/18 13:02  Dose: 8 mg





IVP Administration


 Document     05/18/18 13:02  GMD  (Rec: 05/18/18 13:02  D  [id number])


     Charges for Administration


      # of IVP Administrations                   1





Ondansetron HCl (Zofran Inj)  4 mg IVP STAT STA


   Stop: 05/18/18 14:14


   Last Admin: 05/18/18 14:17  Dose: 4 mg





IVP Administration


 Document     05/18/18 14:17  GMD  (Rec: 05/18/18 14:17  D  [id number])


     Charges for Administration


      # of IVP Administrations                   1





Ondansetron HCl (Zofran Inj)  4 mg IVP STAT STA


   Stop: 05/18/18 16:18


   Last Admin: 05/18/18 16:35  Dose: 4 mg





IVP Administration


 Document     05/18/18 16:35  GMD  (Rec: 05/18/18 16:35  [place] [id number])


     Charges for Administration


      # of IVP Administrations                   1





Pantoprazole Sodium (Protonix Inj)  80 mg IVP STAT STA


   Stop: 05/18/18 15:56


   Last Admin: 05/18/18 17:29  Dose: 80 mg





IVP Administration


 Document     05/18/18 17:29  GMD  (Rec: 05/18/18 17:29  D  [id number])


     Charges for Administration


      # of IVP Administrations                   1





Pantoprazole Sodium (Protonix Inj)  40 mg IVP DAILY CHANCE


   Last Admin: 05/19/18 10:12  Dose: 40 mg





IVP Administration


 Document     05/19/18 10:12  LENORE  (Rec: 05/19/18 10:12  LENORE  [facility][id number][place]


     Charges for Administration


      # of IVP Administrations                   1





Pantoprazole Sodium (Protonix Inj)  40 mg IVP STAT STA


   Stop: 05/19/18 04:09


   Last Admin: 05/19/18 04:19  Dose: 40 mg





IVP Administration


 Document     05/19/18 04:19  CMA  (Rec: 05/19/18 04:20  CMA  [facility][id number][place]


     Charges for Administration


      # of IVP Administrations                   1





Pneumococcal Polyvalent Vaccine (Pneumovax 23 Vaccine)  0.5 ml IM .ONCE ONE


   Stop: 05/18/18 22:25


Potassium Chloride (Klor-Con 10)  40 meq PO STAT STA


   Stop: 05/18/18 13:58


   Last Admin: 05/18/18 14:17  Dose: 40 meq





Potassium Chloride (K-Dur 20 Meq Er Tab)  20 meq PO ONCE ONE


   Stop: 05/20/18 07:34


   Last Admin: 05/20/18 08:10  Dose: 20 meq





Potassium Chloride (K-Dur 20 Meq Er Tab)  40 meq PO ONCE ONE


   Stop: 05/21/18 11:55


   Last Admin: 05/21/18 12:19  Dose: 40 meq











- Scribe Statement


The provider has reviewed the documentation as recorded by the Jim Ricketts





Provider Scribe Attestation:


All medical record entries made by the Jim were at my direction and 

personally dictated by me. I have reviewed the chart and agree that the record 

accurately reflects my personal performance of the history, physical exam, 

medical decision making, and the department course for this patient. I have 

also personally directed, reviewed, and agree with the discharge instructions 

and disposition.











Disposition/Present on Arrival





- Present on Arrival


Any Indicators Present on Arrival: No


History of DVT/PE: No


History of Uncontrolled Diabetes: No


Urinary Catheter: No


History of Decub. Ulcer: No


History Surgical Site Infection Following: None





- Disposition


Have Diagnosis and Disposition been Completed?: Yes


Diagnosis: 


 Intractable abdominal pain





Disposition: HOSPITALIZED


Disposition Time: 19:00


Patient Plan: Admission


Condition: FAIR

## 2018-05-18 NOTE — CP.PCM.CON
<Ari Granado - Last Filed: 05/18/18 19:49>





History of Present Illness





- History of Present Illness


History of Present Illness: 





Surgery Consult note. Dr. Garcia





27yo M with PMHx of Hiatal hernia, GERD, Gastritis here for evaluation of 

severe abdominal pain. Patient states that he has had severe abdominal pain 

located in the periumbilical and mid abdomen which started last night. 

Described as waxing and waning in nature, sharp, severe does not radiate. 

States that he has had similar complaints in the past since December 2017. [facility] 

[facility] which showed gastrits, negative 

H.Pylori testing and "small bowel inflammation." He was scheduled for an US of 

the abdomen but started having pain and came into the ED for severe pain. He 

denies any N/V. Does report diarrhea in the past, however, none during this 

episode. He denies any fevers or chills. No sick contacts. 





PMD: [facility]: [facility] Beaulieu





PMHx: Gastritis, GERD


PSHx: Denies


Social Hx: Ex-. Admits to Tobacco use, denies ETOH use, denies illicit 

drugs


Family Hx: Grandmother with chronic pancreatitis


NKDA





Review of Systems





- Review of Systems


All systems: reviewed and no additional remarkable complaints except





- Constitutional


Constitutional: absent: Chills, Fever





- Cardiovascular


Cardiovascular: absent: Chest Pain, Dyspnea





- Respiratory


Respiratory: absent: Dyspnea





- Gastrointestinal


Gastrointestinal: Abdominal Pain.  absent: Diarrhea, Nausea, Vomiting





- Genitourinary


Genitourinary: absent: Difficulty Urinating, Dysuria





Past Patient History





- Infectious Disease


Hx of Infectious Diseases: None





- Tetanus Immunizations


Tetanus Immunization: Unknown





- Past Medical History & Family History


Past Medical History?: Yes


Past Family History: Reviewed and not pertinent





- Past Social History


Smoking Status: Light Smoker < 10 Cigarettes Daily


Alcohol: None


Drugs: Denies





- CARDIAC


Hx Cardiac Disorders: No





- PULMONARY


Hx Respiratory Disorders: No





- NEUROLOGICAL


Hx Neurological Disorder: No





- HEENT


Hx HEENT Problems: No





- RENAL


Hx Chronic Kidney Disease: No





- ENDOCRINE/METABOLIC


Hx Endocrine Disorders: No





- HEMATOLOGICAL/ONCOLOGICAL


Hx Blood Disorders: No





- INTEGUMENTARY


Hx Dermatological Problems: No





- MUSCULOSKELETAL/RHEUMATOLOGICAL


Hx Musculoskeletal Disorders: No





- GASTROINTESTINAL


Hx Gastrointestinal Disorders: No





- GENITOURINARY/GYNECOLOGICAL


Hx Genitourinary Disorders: No





- PSYCHIATRIC


Hx Psychophysiologic Disorder: No


Hx Depression: No


Hx Emotional Abuse: No


Hx Physical Abuse: No


Hx Substance Use: No





- SURGICAL HISTORY


Hx Surgeries: No





- ANESTHESIA


Hx Anesthesia: No


Hx Anesthesia Reactions: No


Hx Malignant Hyperthermia: No





Meds


Allergies/Adverse Reactions: 


 Allergies











Allergy/AdvReac Type Severity Reaction Status Date / Time


 


No Known Allergies Allergy   Verified 05/18/18 12:36














- Medications


Medications: 


 Current Medications





Famotidine (Pepcid 20mg/50ml Premix)  20 mg in 50 mls @ 100 mls/hr IVPB STAT STA


   Stop: 05/18/18 16:24











Physical Exam





- Constitutional


Appears: Non-toxic, No Acute Distress





- Head Exam


Head Exam: ATRAUMATIC, NORMAL INSPECTION, NORMOCEPHALIC





- Eye Exam


Eye Exam: EOMI, Normal appearance





- ENT Exam


ENT Exam: Mucous Membranes Dry





- Respiratory Exam


Respiratory Exam: NORMAL BREATHING PATTERN.  absent: Accessory Muscle Use, 

Respiratory Distress





- Cardiovascular Exam


Cardiovascular Exam: RRR.  absent: JVD





- GI/Abdominal Exam


Additional comments: 





Tender to light palpation in the mid abdomen. Rebound, voluntary guarding. Soft 

non-distended. No surgical scars noted. 





- Extremities Exam


Extremities exam: Positive for: normal inspection.  Negative for: calf 

tenderness





- Neurological Exam


Neurological exam: Alert, Oriented x3





- Skin


Skin Exam: Dry, Intact, Normal Color, Warm





Results





- Vital Signs


Recent Vital Signs: 


 Last Vital Signs











Temp  98.8 F   05/18/18 12:32


 


Pulse  86   05/18/18 15:09


 


Resp  18   05/18/18 15:09


 


BP  155/74 H  05/18/18 15:09


 


Pulse Ox  98   05/18/18 15:09














- Labs


Result Diagrams: 


 05/18/18 13:05





 05/18/18 13:05


Labs: 


 Laboratory Results - last 24 hr











  05/18/18 05/18/18 05/18/18





  13:05 13:05 13:05


 


WBC  5.8  D  


 


RBC  5.29  


 


Hgb  18.0  D  


 


Hct  48.6  


 


MCV  91.9  D  


 


MCH  34.0  


 


MCHC  37.0  


 


RDW  12.5  


 


Plt Count  172  


 


MPV  11.0  


 


Gran %  32.3 L  


 


Lymph % (Auto)  49.7 H  


 


Mono % (Auto)  13.1 H  


 


Eos % (Auto)  3.3  


 


Baso % (Auto)  1.6  


 


Gran #  1.88  


 


Lymph # (Auto)  2.9  


 


Mono # (Auto)  0.8 H  


 


Eos # (Auto)  0.2  


 


Baso # (Auto)  0.09  


 


ESR  1  


 


PT   13.5 H 


 


INR   1.17 H 


 


pO2   


 


VBG pH   


 


VBG pCO2   


 


VBG HCO3   


 


VBG Total CO2   


 


VBG O2 Sat (Calc)   


 


VBG Base Excess   


 


VBG Potassium   


 


Glucose   


 


Lactate   


 


FiO2   


 


Sodium    142


 


Potassium    3.3 L


 


Chloride    102


 


Carbon Dioxide    23


 


Anion Gap    20


 


BUN    11


 


Creatinine    1.1


 


Est GFR ( Amer)    > 60


 


Est GFR (Non-Af Amer)    > 60


 


Random Glucose    99


 


Calcium    9.1


 


Total Bilirubin    1.7 H


 


AST    195 H


 


ALT    119 H


 


Alkaline Phosphatase    83


 


Total Protein    7.7


 


Albumin    4.3


 


Globulin    3.4


 


Albumin/Globulin Ratio    1.3


 


Lipase    379 H


 


Venous Blood Potassium   


 


Urine Color   


 


Urine Appearance   


 


Urine pH   


 


Ur Specific Gravity   


 


Urine Protein   


 


Urine Glucose (UA)   


 


Urine Ketones   


 


Urine Blood   


 


Urine Nitrate   


 


Urine Bilirubin   


 


Urine Urobilinogen   


 


Ur Leukocyte Esterase   














  05/18/18 05/18/18





  13:35 13:57


 


WBC  


 


RBC  


 


Hgb  


 


Hct  


 


MCV  


 


MCH  


 


MCHC  


 


RDW  


 


Plt Count  


 


MPV  


 


Gran %  


 


Lymph % (Auto)  


 


Mono % (Auto)  


 


Eos % (Auto)  


 


Baso % (Auto)  


 


Gran #  


 


Lymph # (Auto)  


 


Mono # (Auto)  


 


Eos # (Auto)  


 


Baso # (Auto)  


 


ESR  


 


PT  


 


INR  


 


pO2  46 


 


VBG pH  7.51 H 


 


VBG pCO2  31.0 L 


 


VBG HCO3  24.7 


 


VBG Total CO2  25.7 


 


VBG O2 Sat (Calc)  90.1 H 


 


VBG Base Excess  2.3 H 


 


VBG Potassium  3.3 L 


 


Glucose  96 


 


Lactate  3.6 H 


 


FiO2  21.0 


 


Sodium  138.0 


 


Potassium  


 


Chloride  101.0 


 


Carbon Dioxide  


 


Anion Gap  


 


BUN  


 


Creatinine  


 


Est GFR ( Amer)  


 


Est GFR (Non-Af Amer)  


 


Random Glucose  


 


Calcium  


 


Total Bilirubin  


 


AST  


 


ALT  


 


Alkaline Phosphatase  


 


Total Protein  


 


Albumin  


 


Globulin  


 


Albumin/Globulin Ratio  


 


Lipase  


 


Venous Blood Potassium  3.3 L 


 


Urine Color   Yellow


 


Urine Appearance   Clear


 


Urine pH   7.5


 


Ur Specific Gravity   1.010


 


Urine Protein   Negative


 


Urine Glucose (UA)   Negative


 


Urine Ketones   Negative


 


Urine Blood   Negative


 


Urine Nitrate   Negative


 


Urine Bilirubin   Negative


 


Urine Urobilinogen   0.2


 


Ur Leukocyte Esterase   Negative














Assessment & Plan





- Assessment and Plan (Free Text)


Assessment: 





27yo M with pancreatitis, duodenitis


- CT Abd/Pelvis noted. Evidence of pancreatitis of uncinate process with 

adjacent inflammation. Pancreatitis vs duodenitis


- LFTs elevated. Elevated Lipase


- No Leukocytosis. VSS


- Abd US - no evidence of Gallstones


Plan: 





- NPO


- Aggressive IVF


- Recommend GI work-up


- f/u Lipid panel, LDH


- Pain management


- Strict Intake and Output





Further recs as per Dr. Jose Granado PGY1


surgery pager: [phone]





<Obey Garcia - Last Filed: 05/19/18 22:27>





Meds





- Medications


Medications: 


 Current Medications





Acetaminophen (Tylenol 325mg Tab)  650 mg PO Q4H PRN


   PRN Reason: Temperature


   Last Admin: 05/19/18 22:05 Dose:  650 mg


Amitriptyline HCl (Elavil)  10 mg PO DAILY [facility]


   Last Admin: 05/19/18 10:12 Dose:  10 mg


Clonidine HCl (Catapres-Tts2 0.2 Mg/24 Hr)  1 patch TD Q7D@1000 CHANCE


   Last Admin: 05/19/18 17:43 Dose:  1 patch


Hydromorphone HCl (Dilaudid)  1 mg IVP Q2H PRN


   PRN Reason: Pain, severe (8-10)


   Last Admin: 05/19/18 20:36 Dose:  1 mg


Lactated Ringer's (Lactated Ringer's)  1,000 mls @ 150 mls/hr IV .Q6H40M CHANCE


   Last Admin: 05/19/18 17:50 Dose:  150 mls/hr


Piperacillin Sod/Tazobactam Sod (Zosyn 3.375 In Ns 100ml)  100 mls @ 200 mls/hr 

IVPB Q6 CHANCE


   PRN Reason: Protocol


   Stop: 05/20/18 06:29


Ondansetron HCl (Zofran Inj)  4 mg IVP Q6H PRN


   PRN Reason: Nausea/Vomiting


   Last Admin: 05/19/18 20:36 Dose:  4 mg


Pantoprazole Sodium (Protonix Inj)  40 mg IVP Q12 [facility]


   Last Admin: 05/19/18 21:01 Dose:  40 mg











Results





- Vital Signs


Recent Vital Signs: 


 Last Vital Signs











Temp  101.8 F H  05/19/18 22:05


 


Pulse  90   05/19/18 17:43


 


Resp  18   05/19/18 15:43


 


BP  147/100 H  05/19/18 17:43


 


Pulse Ox  97   05/19/18 15:43














- Labs


Result Diagrams: 


 05/19/18 22:02





 05/19/18 22:02


Labs: 


 Laboratory Results - last 24 hr











  05/19/18 05/19/18 05/19/18





  06:00 06:00 06:00


 


WBC  7.2  D  


 


RBC  4.37  


 


Hgb  14.7  D  


 


Hct  41.9 L  


 


MCV  95.9  D  


 


MCH  33.6  


 


MCHC  35.1  


 


RDW  12.6  


 


Plt Count  137  


 


MPV  10.5  


 


Gran %  75.8 H  


 


Lymph % (Auto)  14.4 L  


 


Mono % (Auto)  7.1 H  


 


Eos % (Auto)  2.1  


 


Baso % (Auto)  0.6  


 


Gran #  5.49  


 


Lymph # (Auto)  1.0 L  


 


Mono # (Auto)  0.5  


 


Eos # (Auto)  0.2  


 


Baso # (Auto)  0.04  


 


Sodium   137 


 


Potassium   4.2 


 


Chloride   102 


 


Carbon Dioxide   26 


 


Anion Gap   13 


 


BUN   7 


 


Creatinine   1.2 


 


Est GFR ( Amer)   > 60 


 


Est GFR (Non-Af Amer)   > 60 


 


Random Glucose   90 


 


Lactic Acid   


 


Calcium   8.3 L 


 


Phosphorus   3.2 


 


Magnesium   1.2 L 


 


Iron   


 


TIBC   


 


% Saturation   


 


Total Bilirubin   2.6 H 


 


Direct Bilirubin   


 


AST   128 H D 


 


ALT   97 H 


 


Alkaline Phosphatase   53 


 


Total Protein   6.1 


 


Albumin   3.2 


 


Globulin   2.8 


 


Albumin/Globulin Ratio   1.1 


 


Triglycerides   698 H 


 


Cholesterol   167 


 


LDL Cholesterol Direct   < 30 


 


HDL Cholesterol   20 L 


 


Vitamin B12    648


 


Folate    14.5














  05/19/18 05/19/18 05/19/18





  06:00 06:00 06:00


 


WBC   


 


RBC   


 


Hgb   


 


Hct   


 


MCV   


 


MCH   


 


MCHC   


 


RDW   


 


Plt Count   


 


MPV   


 


Gran %   


 


Lymph % (Auto)   


 


Mono % (Auto)   


 


Eos % (Auto)   


 


Baso % (Auto)   


 


Gran #   


 


Lymph # (Auto)   


 


Mono # (Auto)   


 


Eos # (Auto)   


 


Baso # (Auto)   


 


Sodium   


 


Potassium   


 


Chloride   


 


Carbon Dioxide   


 


Anion Gap   


 


BUN   


 


Creatinine   


 


Est GFR ( Amer)   


 


Est GFR (Non-Af Amer)   


 


Random Glucose   


 


Lactic Acid   0.6 L 


 


Calcium   


 


Phosphorus   


 


Magnesium   


 


Iron  35 L  


 


TIBC  270  


 


% Saturation  13 L  


 


Total Bilirubin   


 


Direct Bilirubin    0.6 H


 


AST   


 


ALT   


 


Alkaline Phosphatase   


 


Total Protein   


 


Albumin   


 


Globulin   


 


Albumin/Globulin Ratio   


 


Triglycerides   


 


Cholesterol   


 


LDL Cholesterol Direct   


 


HDL Cholesterol   


 


Vitamin B12   


 


Folate   














  05/19/18 05/19/18





  22:02 22:02


 


WBC  7.5 


 


RBC  4.23 


 


Hgb  14.1 


 


Hct  40.3 L 


 


MCV  95.3 


 


MCH  33.3 


 


MCHC  35.0 


 


RDW  12.3 


 


Plt Count  114 L 


 


MPV  10.6 


 


Gran %  74.9 H 


 


Lymph % (Auto)  14.6 L 


 


Mono % (Auto)  5.6 


 


Eos % (Auto)  4.6 


 


Baso % (Auto)  0.3 


 


Gran #  5.60 


 


Lymph # (Auto)  1.1 L 


 


Mono # (Auto)  0.4 


 


Eos # (Auto)  0.3 


 


Baso # (Auto)  0.02 


 


Sodium   135


 


Potassium   4.2


 


Chloride   101


 


Carbon Dioxide   26


 


Anion Gap   12


 


BUN   7


 


Creatinine   1.2


 


Est GFR ( Amer)   > 60


 


Est GFR (Non-Af Amer)   > 60


 


Random Glucose   89


 


Lactic Acid  


 


Calcium   7.9 L


 


Phosphorus   2.4 L


 


Magnesium   1.7


 


Iron  


 


TIBC  


 


% Saturation  


 


Total Bilirubin   3.1 H


 


Direct Bilirubin  


 


AST   80 H D


 


ALT   71 H


 


Alkaline Phosphatase   52


 


Total Protein   5.9


 


Albumin   3.2


 


Globulin   2.7


 


Albumin/Globulin Ratio   1.2


 


Triglycerides  


 


Cholesterol  


 


LDL Cholesterol Direct  


 


HDL Cholesterol  


 


Vitamin B12  


 


Folate  














Attending/Attestation





- Attestation


I have personally seen and examined this patient.: Yes


I have fully participated in the care of the patient.: Yes


I have reviewed all pertinent clinical information: Yes


Notes (Text): 





Pt was seen and examined at bedside 


Agree with above note and assessment


Pt with Upper abdominal pain and tenderness


Labs and radiology reviewed


Ass: Recurrent pancreatitis with duodenitis


Plan : GI consult


IV antibiotics


NPO, IVF


Plan d.w primary team in detail


Risk and benefit explained in detail.

## 2018-05-18 NOTE — CT
PROCEDURE:  CT Abdomen and Pelvis with contrast



HISTORY:

Periumbilical Pain, Appendicitis vs Crohn's Diseas



COMPARISON:

None.



TECHNIQUE:

Contrast dose: 100 cc of Omni 350



Radiation dose:



Total exam DLP = 890 mGy-cm.



This CT exam was performed using one or more of the following dose 

reduction techniques: Automated exposure control, adjustment of the 

mA and/or kV according to patient size, and/or use of iterative 

reconstruction technique.



FINDINGS:



LOWER THORAX:

Unremarkable. 



LIVER:

There is fatty infiltration of the liver. 



GALLBLADDER AND BILE DUCTS:

Unremarkable. 



PANCREAS:

There is a small amount of edema and inflammation in the fat planes 

between the uncinate process of the pancreas and the adjacent 

duodenum.  Findings are consistent with pancreatitis versus 

duodenitis.



The findings were discussed Dr. Acosta at 3:20 p.m.



SPLEEN:

Unremarkable. 



ADRENALS:

Unremarkable. No mass. 



KIDNEYS AND URETERS:

Unremarkable. No hydronephrosis. No solid mass. 



VASCULATURE:

Unremarkable. No aortic aneurysm. 



BOWEL:

Unremarkable. No obstruction. No gross mural thickening. 



APPENDIX:

Normal appendix. 



PERITONEUM:

Unremarkable. No free fluid. No free air. 



LYMPH NODES:

Unremarkable. No enlarged lymph nodes. 



BLADDER:

Unremarkable. 



REPRODUCTIVE:

Unremarkable. 



BONES:

No acute fracture. 



OTHER FINDINGS:

None.



IMPRESSION:

There is a small amount of edema and inflammation in the fat planes 

between the uncinate process of the pancreas and the adjacent 

duodenum.  Findings are consistent with pancreatitis versus 

duodenitis.



Fatty infiltration of the liver

## 2018-05-18 NOTE — US
HISTORY:

Abdominal Pain and Elevated LFT's ? Gallstone?



COMPARISON:

None.



TECHNIQUE:

Sonographic evaluation of the abdomen.



FINDINGS:



LIVER:

Measures 16.8 cm.  Hepatopedal blood flow. Fatty infiltration 

manifest ultrasonographically as increased echogenicity of the liver 

parenchyma. No mass. No intrahepatic bile duct dilatation.



GALLBLADDER:

Unremarkable. No gallstones.



COMMON BILE DUCT:

Measures 3.4 mm. No stones. No dilatation.



PANCREAS:

Obscured by overlying bowel gas. Non diagnostic assessment of the 

pancreas



RIGHT KIDNEY:

Measures 5 x 9.4cm. Normal echogenicity. No calculus, mass, or 

hydronephrosis.



LEFT KIDNEY:

Measures 5.3 x 10.7cm. Normal echogenicity. No calculus, mass, or 

hydronephrosis.



SPLEEN:

Normal in size and contour. No mass.



AORTA:

No aneurysmal dilatation. 



IVC:

Unremarkable. 



OTHER FINDINGS:

None. 



IMPRESSION:

Hepatic steatosis.  Otherwise unremarkable study.



Limitations of the current examination:



Nondiagnostic study of the pancreas.

## 2018-05-19 LAB
% IRON SATURATION: 13 % (ref 20–55)
ALBUMIN SERPL-MCNC: 3.2 G/DL (ref 3–4.8)
ALBUMIN SERPL-MCNC: 3.2 G/DL (ref 3–4.8)
ALBUMIN/GLOB SERPL: 1.1 {RATIO} (ref 1.1–1.8)
ALBUMIN/GLOB SERPL: 1.2 {RATIO} (ref 1.1–1.8)
ALT SERPL-CCNC: 71 U/L (ref 7–56)
ALT SERPL-CCNC: 97 U/L (ref 7–56)
AST SERPL-CCNC: 128 U/L (ref 17–59)
AST SERPL-CCNC: 80 U/L (ref 17–59)
BASOPHILS # BLD AUTO: 0.02 K/MM3 (ref 0–2)
BASOPHILS # BLD AUTO: 0.04 K/MM3 (ref 0–2)
BASOPHILS NFR BLD: 0.3 % (ref 0–3)
BASOPHILS NFR BLD: 0.6 % (ref 0–3)
BUN SERPL-MCNC: 7 MG/DL (ref 7–21)
BUN SERPL-MCNC: 7 MG/DL (ref 7–21)
CALCIUM SERPL-MCNC: 7.9 MG/DL (ref 8.4–10.5)
CALCIUM SERPL-MCNC: 8.3 MG/DL (ref 8.4–10.5)
EOSINOPHIL # BLD: 0.2 10*3/UL (ref 0–0.7)
EOSINOPHIL # BLD: 0.3 10*3/UL (ref 0–0.7)
EOSINOPHIL NFR BLD: 2.1 % (ref 1.5–5)
EOSINOPHIL NFR BLD: 4.6 % (ref 1.5–5)
ERYTHROCYTE [DISTWIDTH] IN BLOOD BY AUTOMATED COUNT: 12.3 % (ref 11.5–14.5)
ERYTHROCYTE [DISTWIDTH] IN BLOOD BY AUTOMATED COUNT: 12.6 % (ref 11.5–14.5)
FOLATE SERPL-MCNC: 14.5 NG/ML
GFR NON-AFRICAN AMERICAN: > 60
GFR NON-AFRICAN AMERICAN: > 60
GRANULOCYTES # BLD: 5.49 10*3/UL (ref 1.4–6.5)
GRANULOCYTES # BLD: 5.6 10*3/UL (ref 1.4–6.5)
GRANULOCYTES NFR BLD: 74.9 % (ref 50–68)
GRANULOCYTES NFR BLD: 75.8 % (ref 50–68)
HDLC SERPL-MCNC: 20 MG/DL (ref 29–60)
HGB BLD-MCNC: 14.1 G/DL (ref 14–18)
HGB BLD-MCNC: 14.7 G/DL (ref 14–18)
IRON SERPL-MCNC: 35 UG/DL (ref 45–180)
LDLC SERPL-MCNC: < 30 MG/DL (ref 0–129)
LYMPHOCYTES # BLD: 1 10*3/UL (ref 1.2–3.4)
LYMPHOCYTES # BLD: 1.1 10*3/UL (ref 1.2–3.4)
LYMPHOCYTES NFR BLD AUTO: 14.4 % (ref 22–35)
LYMPHOCYTES NFR BLD AUTO: 14.6 % (ref 22–35)
MCH RBC QN AUTO: 33.3 PG (ref 25–35)
MCH RBC QN AUTO: 33.6 PG (ref 25–35)
MCHC RBC AUTO-ENTMCNC: 35 G/DL (ref 31–37)
MCHC RBC AUTO-ENTMCNC: 35.1 G/DL (ref 31–37)
MCV RBC AUTO: 95.3 FL (ref 80–105)
MCV RBC AUTO: 95.9 FL (ref 80–105)
MONOCYTES # BLD AUTO: 0.4 10*3/UL (ref 0.1–0.6)
MONOCYTES # BLD AUTO: 0.5 10*3/UL (ref 0.1–0.6)
MONOCYTES NFR BLD: 5.6 % (ref 1–6)
MONOCYTES NFR BLD: 7.1 % (ref 1–6)
PLATELET # BLD: 114 10^3/UL (ref 120–450)
PLATELET # BLD: 137 10^3/UL (ref 120–450)
PMV BLD AUTO: 10.5 FL (ref 7–11)
PMV BLD AUTO: 10.6 FL (ref 7–11)
RBC # BLD AUTO: 4.23 10^6/UL (ref 3.5–6.1)
RBC # BLD AUTO: 4.37 10^6/UL (ref 3.5–6.1)
TIBC SERPL-MCNC: 270 UG/DL (ref 261–462)
VIT B12 SERPL-MCNC: 648 PG/ML (ref 239–931)
WBC # BLD AUTO: 7.2 10^3/UL (ref 4.5–11)
WBC # BLD AUTO: 7.5 10^3/UL (ref 4.5–11)

## 2018-05-19 RX ADMIN — HYDROMORPHONE HYDROCHLORIDE PRN MG: 1 INJECTION, SOLUTION INTRAMUSCULAR; INTRAVENOUS; SUBCUTANEOUS at 00:46

## 2018-05-19 RX ADMIN — HYDROMORPHONE HYDROCHLORIDE PRN MG: 1 INJECTION, SOLUTION INTRAMUSCULAR; INTRAVENOUS; SUBCUTANEOUS at 13:28

## 2018-05-19 RX ADMIN — HYDROMORPHONE HYDROCHLORIDE PRN MG: 1 INJECTION, SOLUTION INTRAMUSCULAR; INTRAVENOUS; SUBCUTANEOUS at 22:34

## 2018-05-19 RX ADMIN — HYDROMORPHONE HYDROCHLORIDE PRN MG: 1 INJECTION, SOLUTION INTRAMUSCULAR; INTRAVENOUS; SUBCUTANEOUS at 17:49

## 2018-05-19 RX ADMIN — HYDROMORPHONE HYDROCHLORIDE PRN MG: 1 INJECTION, SOLUTION INTRAMUSCULAR; INTRAVENOUS; SUBCUTANEOUS at 12:21

## 2018-05-19 RX ADMIN — HYDROMORPHONE HYDROCHLORIDE PRN MG: 1 INJECTION, SOLUTION INTRAMUSCULAR; INTRAVENOUS; SUBCUTANEOUS at 02:46

## 2018-05-19 RX ADMIN — HYDROMORPHONE HYDROCHLORIDE PRN MG: 1 INJECTION, SOLUTION INTRAMUSCULAR; INTRAVENOUS; SUBCUTANEOUS at 07:45

## 2018-05-19 RX ADMIN — DIPHENHYDRAMINE HYDROCHLORIDE PRN MG: 50 INJECTION INTRAMUSCULAR; INTRAVENOUS at 04:01

## 2018-05-19 RX ADMIN — PIPERACILLIN AND TAZOBACTAM SCH MLS/HR: 3; .375 INJECTION, POWDER, LYOPHILIZED, FOR SOLUTION INTRAVENOUS; PARENTERAL at 12:43

## 2018-05-19 RX ADMIN — HYDROMORPHONE HYDROCHLORIDE PRN MG: 1 INJECTION, SOLUTION INTRAMUSCULAR; INTRAVENOUS; SUBCUTANEOUS at 05:40

## 2018-05-19 RX ADMIN — HYDROMORPHONE HYDROCHLORIDE PRN MG: 1 INJECTION, SOLUTION INTRAMUSCULAR; INTRAVENOUS; SUBCUTANEOUS at 09:58

## 2018-05-19 RX ADMIN — HYDROMORPHONE HYDROCHLORIDE PRN MG: 1 INJECTION, SOLUTION INTRAMUSCULAR; INTRAVENOUS; SUBCUTANEOUS at 20:36

## 2018-05-19 RX ADMIN — HYDROMORPHONE HYDROCHLORIDE PRN MG: 1 INJECTION, SOLUTION INTRAMUSCULAR; INTRAVENOUS; SUBCUTANEOUS at 15:53

## 2018-05-19 RX ADMIN — DIPHENHYDRAMINE HYDROCHLORIDE PRN MG: 50 INJECTION INTRAMUSCULAR; INTRAVENOUS at 00:10

## 2018-05-19 RX ADMIN — PIPERACILLIN AND TAZOBACTAM SCH MLS/HR: 3; .375 INJECTION, POWDER, LYOPHILIZED, FOR SOLUTION INTRAVENOUS; PARENTERAL at 17:51

## 2018-05-19 NOTE — HP
DATE OF EXAM:  05/18/2018



The patient is a 28-year-old male.  The patient was seen and examined on

the bedside on 05/18/2018.



CHIEF COMPLAINT:  Abdominal pain.



HISTORY OF PRESENT ILLNESS:  Mr. Yo Woodall is a 28-year-old male, [place]

war , who has a past medical history includes chronic abdominal

pain, first episode with horrible muscle cramps, 12/2017, which was blamed

, abdominal pain, again on 04/2018 for which he was admitted to the

ICU.  Had colonoscopy, endoscopy done at [facility] and H. pylori test was

done.  Now, came to the emergency department complaining of periumbilical

abdominal pain.  Patient reports prior endoscopy and colonoscopy showed

swelling of the intestine and duodenitis.  Patient was given antibiotics

and Pepcid, reports antibiotics will finish in a few days.  Another

endoscopy and colonoscopy will be done after 2 months after antibiotics

usage finished.  Patient denies any fever, chills, but complaining about

severe abdominal pain.  We admitted the patient.  Did CAT scan, ultrasound.

GI and Surgical consult called.  Discussion done with Dr. Jesica irene and

patient and patient's girlfriend.



PAST MEDICAL HISTORY:  Not significant except that abdominal pain.



FAMILY HISTORY:  Father and mother, noncontributory.



HABITS:  Light smoker.  No alcohol.  No substance abuse.



ALLERGIES:  PATIENT IS NOT ALLERGIC WITH ANY MEDICATIONS.



HOME MEDICATIONS:  Amitriptyline, Zofran and Protonix.



REVIEW OF SYSTEMS:  Patient was seen and examined in his room, was

restless, walking around holding his abdomen.  Girlfriend was sitting on

the bedside also.  No fever.  No chills.  No nausea, vomiting or diarrhea. 

No headache.  No dizziness.



PHYSICAL EXAMINATION:

VITAL SIGNS:  Temperature 98.6, pulse 98, respiratory rate 18, blood

pressure 158/89, pulse oximetry 98%.

HEENT:  Head, normocephalic and atraumatic.  Eyes, PERRLA.  Extraocular

muscles intact.  Conjunctivae clear.  Nose patent.  Mucous membranes moist.

NECK:  Supple.  No carotid bruit.  No JVD or thyromegaly.

CHEST:  Bilaterally symmetrical.

HEART:  S1, S2 positive.

LUNGS:  Clear to auscultation.

ABDOMEN:  Soft, tender.  Diffuse tenderness almost in the middle of the

abdomen and actually in four quadrants, especially in periumbilical area. 

No peritoneal sign.

EXTREMITIES:  No edema.  No cyanosis.

NEUROLOGIC:  Patient is awake, alert.  Moving all four extremities.  No

focal deficit.



LABORATORY DATA:  White blood cell is 5.8, hemoglobin 18.0, hematocrit

48.2, platelets 172.  Sodium 142, potassium 3.3, BUN 11, creatinine 1.1 and

glucose 99.



ASSESSMENT AND PLAN:  Mr. Yo Woodall is a 28-year-old male with

hypokalemia, replaced; came with severe abdominal pain.  CAT scan of the

abdomen and pelvis done, shows small amount of edema and inflammation in

the fat planes between the uncinate process of the pancreas and the

adjacent duodenum.  Findings are consistent with pancreatitis versus

duodenitis, fatty infiltration in the liver and ultrasound of the abdomen

done also, showed hepatic steatosis,  Seen by the surgical team, may

be patient has duodenitis, enteritis.  Liver function test elevated,

elevated lipase.  No leukocytosis, no gallstones; plan is to keep patient

n.p.o., aggressive intravenous fluids.  GI is on the case.  Also discussion

done with Dr. Mooney.  Follow up lipid panel.  Repeat labs.  Pain

management.  Strict intake and output.  Appreciated Dr. Garcia's input.

We will follow up.







__________________________________________

Vanda Hayes MD



DD:  05/19/2018 0:58:38

DT:  05/19/2018 3:05:19

Job # [id number]

MTDD

## 2018-05-19 NOTE — CP.PCM.PN
<Darlene Pryorhany - Last Filed: 05/19/18 04:55>





Subjective





- Date & Time of Evaluation


Date of Evaluation: 05/19/18


Time of Evaluation: 04:20





- Subjective


Subjective: 





Patient seen and examined at bedside this AM. patient reports severe epigastric 

pain that is spreading to his lower abdomen not well controlled with the 

dilaudid. He feels like the pain is worsening and that he is more distended. 

Denies any nausea, vomiting, fevers or chills, no bowel function.





Objective





- Vital Signs/Intake and Output


Vital Signs (last 24 hours): 


 











Temp Pulse Resp BP Pulse Ox


 


 99.5 F   86   24   145/111 H  95 


 


 05/19/18 00:58  05/19/18 00:58  05/19/18 00:58  05/19/18 00:58  05/19/18 00:58








Intake and Output: 


 











 05/18/18 05/19/18





 18:59 06:59


 


Intake Total  0


 


Output Total  200


 


Balance  -200














- Medications


Medications: 


 Current Medications





Amitriptyline HCl (Elavil)  10 mg PO DAILY [facility]


Diphenhydramine HCl (Benadryl)  25 mg IVP Q4H PRN


   PRN Reason: Allergy symptoms


   Last Admin: 05/19/18 04:01 Dose:  25 mg


Hydromorphone HCl (Dilaudid)  0.5 mg IVP Q2H PRN


   PRN Reason: Pain, severe (8-10)


   Last Admin: 05/19/18 02:46 Dose:  0.5 mg


Lactated Ringer's (Lactated Ringer's)  1,000 mls @ 300 mls/hr IV .Q3H20M [facility]


   Last Admin: 05/19/18 04:20 Dose:  300 mls/hr


Ondansetron HCl (Zofran Inj)  4 mg IVP Q6H PRN


   PRN Reason: Nausea/Vomiting


   Last Admin: 05/19/18 02:46 Dose:  4 mg


Pantoprazole Sodium (Protonix Inj)  40 mg IVP DAILY [facility]











- Labs


Labs: 


 











PT  13.5 SECONDS (9.4-12.5)  H  05/18/18  13:05    


 


INR  1.17  (0.93-1.08)  H  05/18/18  13:05    














- Constitutional


Appears: Well, Non-toxic, In Acute Distress (due to pain)





- Head Exam


Head Exam: ATRAUMATIC, NORMOCEPHALIC





- Eye Exam


Eye Exam: Normal appearance.  absent: Conjunctival injection, Scleral icterus





- ENT Exam


ENT Exam: Mucous Membranes Moist, Normal Oropharynx





- Respiratory Exam


Respiratory Exam: NORMAL BREATHING PATTERN.  absent: Accessory Muscle Use, 

Respiratory Distress





- Cardiovascular Exam


Cardiovascular Exam: Tachycardia (110-119bpm)





- GI/Abdominal Exam


GI & Abdominal Exam: Distended (mild), Guarding (voluntary), Soft, Tenderness (

diffuse abdominal tenderness worse in the epigastrium), Rebound.  absent: Rigid





- Extremities Exam


Extremities Exam: absent: Calf Tenderness, Pedal Edema, Tenderness





- Neurological Exam


Neurological Exam: Alert, Awake, Oriented x3





- Psychiatric Exam


Psychiatric exam: Normal Affect, Normal Mood





- Skin


Skin Exam: Dry, Intact, Normal Color, Warm





Assessment and Plan





- Assessment and Plan (Free Text)


Assessment: 





28M with severe epigastric pain d/t pancreatitis vs duodenitis


Plan: 


AM CBC/CMP


Monitor urine output closely--patient had 0.5cc/kg/h which is the low side of 

adequate--will increase fluids to 300cc/hr of LR


Monitor vitals closely--patient has tachycardia and elevated BP, likely due to 

pain. Instructed nurse to reach out to Dr. Hayes. Will continue to monitor 

closely


Follow up GI recs--has been seen by Dr. Mooney but no recs yet. Will reach 

out today


Continue NPO


Stat CXR to rule out free air


Serial exams


Continue dilaudid 





Will discuss with Dr. Garcia,further recs per him


Meron Pryor PGY2








<Obey Garcia - Last Filed: 05/19/18 22:31>





Objective





- Vital Signs/Intake and Output


Vital Signs (last 24 hours): 


 











Temp Pulse Resp BP Pulse Ox


 


 101.8 F H  90   18   147/100 H  97 


 


 05/19/18 22:05  05/19/18 17:43  05/19/18 15:43  05/19/18 17:43  05/19/18 15:43








Intake and Output: 


 











 05/19/18 05/20/18





 18:59 06:59


 


Intake Total 0 


 


Output Total 800 


 


Balance -800 














- Medications


Medications: 


 Current Medications





Acetaminophen (Tylenol 325mg Tab)  650 mg PO Q4H PRN


   PRN Reason: Temperature


   Last Admin: 05/19/18 22:05 Dose:  650 mg


Amitriptyline HCl (Elavil)  10 mg PO DAILY [facility]


   Last Admin: 05/19/18 10:12 Dose:  10 mg


Clonidine HCl (Catapres-Tts2 0.2 Mg/24 Hr)  1 patch TD Q7D@1000 [facility]


   Last Admin: 05/19/18 17:43 Dose:  1 patch


Hydromorphone HCl (Dilaudid)  1 mg IVP Q2H PRN


   PRN Reason: Pain, severe (8-10)


   Last Admin: 05/19/18 20:36 Dose:  1 mg


Lactated Ringer's (Lactated Ringer's)  1,000 mls @ 150 mls/hr IV .Q6H40M [facility]


   Last Admin: 05/19/18 17:50 Dose:  150 mls/hr


Piperacillin Sod/Tazobactam Sod (Zosyn 3.375 In Ns 100ml)  100 mls @ 200 mls/hr 

IVPB Q6 [facility]


   PRN Reason: Protocol


   Stop: 05/20/18 06:29


Ondansetron HCl (Zofran Inj)  4 mg IVP Q6H PRN


   PRN Reason: Nausea/Vomiting


   Last Admin: 05/19/18 20:36 Dose:  4 mg


Pantoprazole Sodium (Protonix Inj)  40 mg IVP Q12 [facility]


   Last Admin: 05/19/18 21:01 Dose:  40 mg











- Labs


Labs: 


 





 05/19/18 22:02 





 05/19/18 22:02 





 











PT  13.5 SECONDS (9.4-12.5)  H  05/18/18  13:05    


 


INR  1.17  (0.93-1.08)  H  05/18/18  13:05    














Attending/Attestation





- Attestation


I have personally seen and examined this patient.: Yes


I have fully participated in the care of the patient.: Yes


I have reviewed all pertinent clinical information, including history, physical 

exam and plan: Yes


Notes (Text): 





Pt was seen and examined at bedside 


Agree with above note and assessment


Pt with recurrent pancreatitis 


MRCP today


IV antibiotics


NPO, IVF


Plan d.w pt in detail


Risk and benefit explained in detail.

## 2018-05-19 NOTE — RAD
HISTORY:

abdominal pain, evaluate for free air  



COMPARISON:

12/26/2017 



FINDINGS:



LUNGS:

No active pulmonary disease.



PLEURA:

No significant pleural effusion identified, no pneumothorax apparent.



CARDIOVASCULAR:

Normal.



OSSEOUS STRUCTURES:

No significant abnormalities.



VISUALIZED UPPER ABDOMEN:

Normal.



OTHER FINDINGS:

None.



IMPRESSION:

No active disease. No significant interval change compared to the 

prior examination(s).

## 2018-05-20 LAB
ALBUMIN SERPL-MCNC: 3.2 G/DL (ref 3–4.8)
ALBUMIN/GLOB SERPL: 1.2 {RATIO} (ref 1.1–1.8)
ALT SERPL-CCNC: 68 U/L (ref 7–56)
APPEARANCE UR: CLEAR
AST SERPL-CCNC: 73 U/L (ref 17–59)
BACTERIA #/AREA URNS HPF: (no result) /[HPF]
BASOPHILS # BLD AUTO: 0.03 K/MM3 (ref 0–2)
BASOPHILS NFR BLD: 0.4 % (ref 0–3)
BILIRUB UR-MCNC: NEGATIVE MG/DL
BUN SERPL-MCNC: 6 MG/DL (ref 7–21)
CALCIUM SERPL-MCNC: 8 MG/DL (ref 8.4–10.5)
COLOR UR: YELLOW
EOSINOPHIL # BLD: 0.3 10*3/UL (ref 0–0.7)
EOSINOPHIL NFR BLD: 4.1 % (ref 1.5–5)
EPI CELLS #/AREA URNS HPF: (no result) /HPF (ref 0–5)
ERYTHROCYTE [DISTWIDTH] IN BLOOD BY AUTOMATED COUNT: 12.3 % (ref 11.5–14.5)
GFR NON-AFRICAN AMERICAN: > 60
GLUCOSE UR STRIP-MCNC: NEGATIVE MG/DL
GRANULOCYTES # BLD: 5.7 10*3/UL (ref 1.4–6.5)
GRANULOCYTES NFR BLD: 72.3 % (ref 50–68)
HGB BLD-MCNC: 13.7 G/DL (ref 14–18)
LEUKOCYTE ESTERASE UR-ACNC: NEGATIVE LEU/UL
LYMPHOCYTES # BLD: 1.1 10*3/UL (ref 1.2–3.4)
LYMPHOCYTES NFR BLD AUTO: 13.6 % (ref 22–35)
MCH RBC QN AUTO: 33.2 PG (ref 25–35)
MCHC RBC AUTO-ENTMCNC: 34.8 G/DL (ref 31–37)
MCV RBC AUTO: 95.4 FL (ref 80–105)
MONOCYTES # BLD AUTO: 0.8 10*3/UL (ref 0.1–0.6)
MONOCYTES NFR BLD: 9.6 % (ref 1–6)
PH UR STRIP: 6 [PH] (ref 4.7–8)
PLATELET # BLD: 118 10^3/UL (ref 120–450)
PMV BLD AUTO: 11.2 FL (ref 7–11)
PROT UR STRIP-MCNC: (no result) MG/DL
RBC # BLD AUTO: 4.13 10^6/UL (ref 3.5–6.1)
RBC # UR STRIP: (no result) /UL
SP GR UR STRIP: 1.02 (ref 1–1.03)
UROBILINOGEN UR STRIP-ACNC: 1 E.U./DL
WBC # BLD AUTO: 7.9 10^3/UL (ref 4.5–11)
WBC #/AREA URNS HPF: NEGATIVE /HPF (ref 0–6)

## 2018-05-20 RX ADMIN — HYDROMORPHONE HYDROCHLORIDE PRN MG: 1 INJECTION, SOLUTION INTRAMUSCULAR; INTRAVENOUS; SUBCUTANEOUS at 02:40

## 2018-05-20 RX ADMIN — PIPERACILLIN AND TAZOBACTAM SCH MLS/HR: 3; .375 INJECTION, POWDER, LYOPHILIZED, FOR SOLUTION INTRAVENOUS; PARENTERAL at 17:22

## 2018-05-20 RX ADMIN — HYDROMORPHONE HYDROCHLORIDE PRN MG: 1 INJECTION, SOLUTION INTRAMUSCULAR; INTRAVENOUS; SUBCUTANEOUS at 00:34

## 2018-05-20 RX ADMIN — HYDROMORPHONE HYDROCHLORIDE PRN MG: 1 INJECTION, SOLUTION INTRAMUSCULAR; INTRAVENOUS; SUBCUTANEOUS at 19:30

## 2018-05-20 RX ADMIN — PIPERACILLIN AND TAZOBACTAM SCH MLS/HR: 3; .375 INJECTION, POWDER, LYOPHILIZED, FOR SOLUTION INTRAVENOUS; PARENTERAL at 12:33

## 2018-05-20 RX ADMIN — HYDROMORPHONE HYDROCHLORIDE PRN MG: 1 INJECTION, SOLUTION INTRAMUSCULAR; INTRAVENOUS; SUBCUTANEOUS at 13:09

## 2018-05-20 RX ADMIN — HYDROMORPHONE HYDROCHLORIDE PRN MG: 1 INJECTION, SOLUTION INTRAMUSCULAR; INTRAVENOUS; SUBCUTANEOUS at 04:30

## 2018-05-20 RX ADMIN — HYDROMORPHONE HYDROCHLORIDE PRN MG: 1 INJECTION, SOLUTION INTRAMUSCULAR; INTRAVENOUS; SUBCUTANEOUS at 21:29

## 2018-05-20 RX ADMIN — PIPERACILLIN AND TAZOBACTAM SCH MLS/HR: 3; .375 INJECTION, POWDER, LYOPHILIZED, FOR SOLUTION INTRAVENOUS; PARENTERAL at 05:39

## 2018-05-20 RX ADMIN — HYDROMORPHONE HYDROCHLORIDE PRN MG: 1 INJECTION, SOLUTION INTRAMUSCULAR; INTRAVENOUS; SUBCUTANEOUS at 08:09

## 2018-05-20 RX ADMIN — HYDROMORPHONE HYDROCHLORIDE PRN MG: 1 INJECTION, SOLUTION INTRAMUSCULAR; INTRAVENOUS; SUBCUTANEOUS at 15:18

## 2018-05-20 RX ADMIN — PIPERACILLIN AND TAZOBACTAM SCH MLS/HR: 3; .375 INJECTION, POWDER, LYOPHILIZED, FOR SOLUTION INTRAVENOUS; PARENTERAL at 00:01

## 2018-05-20 RX ADMIN — HYDROMORPHONE HYDROCHLORIDE PRN MG: 1 INJECTION, SOLUTION INTRAMUSCULAR; INTRAVENOUS; SUBCUTANEOUS at 17:20

## 2018-05-20 RX ADMIN — HYDROMORPHONE HYDROCHLORIDE PRN MG: 1 INJECTION, SOLUTION INTRAMUSCULAR; INTRAVENOUS; SUBCUTANEOUS at 10:57

## 2018-05-20 NOTE — CP.PCM.PN
<Tanvi Pires - Last Filed: 05/20/18 15:45>





Subjective





- Date & Time of Evaluation


Date of Evaluation: 05/20/18


Time of Evaluation: 15:38





- Subjective


Subjective: 





General surgery progress note for Dr. Garcia-Tanvi Pires, PGY-1





Pt S & E at bedside at 0735





Pt reports severe epigastric abdominal pain, minimally controlled by pain 

medications overnight. Is currently testing to see if he can handle the pain by 

putting off getting pain medications. Requesting ice chips due to dry mouth.  

Admits to bloating. Denies N & V, F & C.  





Objective





- Vital Signs/Intake and Output


Vital Signs (last 24 hours): 


 











Temp Pulse Resp BP Pulse Ox


 


 100 F H  91 H  18   145/93 H  95 


 


 05/20/18 14:37  05/20/18 14:37  05/20/18 14:37  05/20/18 14:37  05/20/18 14:37








Intake and Output: 


 











 05/20/18 05/20/18





 06:59 18:59


 


Intake Total 2520 240


 


Output Total 1850 


 


Balance 670 240














- Medications


Medications: 


 Current Medications





Acetaminophen (Tylenol 325mg Tab)  650 mg PO Q4H PRN


   PRN Reason: Temperature


   Last Admin: 05/19/18 22:05 Dose:  650 mg


Amitriptyline HCl (Elavil)  10 mg PO DAILY [facility]


   Last Admin: 05/20/18 10:58 Dose:  10 mg


Clonidine HCl (Catapres-Tts2 0.2 Mg/24 Hr)  1 patch TD Q7D@1000 [facility]


   Last Admin: 05/19/18 17:43 Dose:  1 patch


Docusate Sodium (Colace)  100 mg PO DAILY [facility]


   Last Admin: 05/20/18 12:34 Dose:  100 mg


Hydromorphone HCl (Dilaudid)  1 mg IVP Q2H PRN


   PRN Reason: Pain, severe (8-10)


   Last Admin: 05/20/18 15:18 Dose:  1 mg


Lactated Ringer's (Lactated Ringer's)  1,000 mls @ 150 mls/hr IV .Q6H40M [facility]


   Last Admin: 05/20/18 15:22 Dose:  150 mls/hr


Piperacillin Sod/Tazobactam Sod (Zosyn 3.375 In Ns 100ml)  100 mls @ 200 mls/hr 

IVPB Q6 [facility]


   PRN Reason: Protocol


   Stop: 05/29/18 12:01


   Last Admin: 05/20/18 12:33 Dose:  200 mls/hr


Ondansetron HCl (Zofran Inj)  4 mg IVP Q6H PRN


   PRN Reason: Nausea/Vomiting


   Last Admin: 05/20/18 04:29 Dose:  4 mg


Pantoprazole Sodium (Protonix Inj)  40 mg IVP Q12 [facility]


   Last Admin: 05/20/18 10:59 Dose:  40 mg











- Labs


Labs: 


 





 05/20/18 07:00 





 05/20/18 07:00 





 











PT  13.5 SECONDS (9.4-12.5)  H  05/18/18  13:05    


 


INR  1.17  (0.93-1.08)  H  05/18/18  13:05    














- Constitutional


Appears: Non-toxic, No Acute Distress





- Head Exam


Head Exam: ATRAUMATIC, NORMAL INSPECTION, NORMOCEPHALIC





- Eye Exam


Eye Exam: EOMI, Normal appearance





- ENT Exam


ENT Exam: Mucous Membranes Moist, Normal Exam





- Neck Exam


Neck Exam: Full ROM, Normal Inspection





- Respiratory Exam


Respiratory Exam: NORMAL BREATHING PATTERN





- Cardiovascular Exam


Cardiovascular Exam: REGULAR RHYTHM, +S1, +S2





- GI/Abdominal Exam


GI & Abdominal Exam: Distended (slightly, more bloated), Soft, Tenderness (

epigastric, RUQ).  absent: Firm, Guarding, Rigid





- Extremities Exam


Extremities Exam: Normal Inspection.  absent: Pedal Edema





- Neurological Exam


Neurological Exam: Alert, Awake, CN II-XII Intact, Oriented x3





- Psychiatric Exam


Psychiatric exam: Normal Affect.  absent: Normal Mood (labile at bedside)





- Skin


Skin Exam: Dry, Intact, Normal Color, Warm





Assessment and Plan





- Assessment and Plan (Free Text)


Assessment: 





28M w/pancreatitis


Plan: 





MRCP negative for cholelithiasis, choledocholithiasis, or acute cholecystitis


No surgical intervention at this time


Further mgmt as per primary & GI teams


Please re-consult as needed





DW attending





[place], PGY-1





<Obey Garcia - Last Filed: 05/21/18 19:57>





Objective





- Vital Signs/Intake and Output


Vital Signs (last 24 hours): 


 











Temp Pulse Resp BP Pulse Ox


 


 99.3 F   69   20   140/88   97 


 


 05/21/18 14:00  05/21/18 14:00  05/21/18 14:00  05/21/18 14:00  05/21/18 14:00








Intake and Output: 


 











 05/21/18 05/22/18





 18:59 06:59


 


Intake Total 0 


 


Balance 0 














- Medications


Medications: 


 Current Medications





Acetaminophen (Tylenol 325mg Tab)  650 mg PO Q4H PRN


   PRN Reason: Temperature


   Last Admin: 05/20/18 22:03 Dose:  650 mg


Amitriptyline HCl (Elavil)  10 mg PO DAILY [facility]


   Last Admin: 05/21/18 10:29 Dose:  10 mg


Clonidine HCl (Catapres-Tts3 0.3 Mg/24 Hr)  1 patch TD Q7D@1000 [facility]


   Last Admin: 05/20/18 17:19 Dose:  1 patch


Docusate Sodium (Colace)  100 mg PO DAILY [facility]


   Last Admin: 05/21/18 10:28 Dose:  100 mg


Hydromorphone HCl (Dilaudid)  1 mg IVP Q2H PRN


   PRN Reason: Pain, severe (8-10)


   Last Admin: 05/21/18 19:25 Dose:  1 mg


Lactated Ringer's (Lactated Ringer's)  1,000 mls @ 150 mls/hr IV .Q6H40M [facility]


   Last Admin: 05/21/18 12:25 Dose:  150 mls/hr


Piperacillin Sod/Tazobactam Sod (Zosyn 3.375 In Ns 100ml)  100 mls @ 200 mls/hr 

IVPB Q6 [facility]


   PRN Reason: Protocol


   Stop: 05/29/18 12:01


   Last Admin: 05/21/18 17:19 Dose:  200 mls/hr


Ondansetron HCl (Zofran Inj)  4 mg IVP Q6H PRN


   PRN Reason: Nausea/Vomiting


   Last Admin: 05/21/18 19:30 Dose:  4 mg


Pantoprazole Sodium (Protonix Inj)  40 mg IVP Q12 [facility]


   Last Admin: 05/21/18 10:29 Dose:  40 mg











- Labs


Labs: 


 





 05/21/18 06:20 





 05/21/18 06:20 





 











PT  13.5 SECONDS (9.4-12.5)  H  05/18/18  13:05    


 


INR  1.17  (0.93-1.08)  H  05/18/18  13:05    














Attending/Attestation





- Attestation


I have personally seen and examined this patient.: Yes


I have fully participated in the care of the patient.: Yes


I have reviewed all pertinent clinical information, including history, physical 

exam and plan: Yes


Notes (Text): 





Pt was seen and examined at bedside


Agree with above note and assessment


Pt is improving clinically


MRCP is negative 


c.w current mx


No acute general surgery consult required


Plan d.w pt in detail


Risk and benefit explained in detail.

## 2018-05-20 NOTE — CON
DATE:  05/20/2018



CHIEF COMPLAINT:  Abdominal pain times several days.



HISTORY OF PRESENT ILLNESS:  This is a 28-year-old male, was known to me

from a previous admission in 12/2017 who is a , he is a [employer] of

[employer] who in last year had a influenza A, was treated with Tamiflu at

that time, now admitted through the emergency room, was seen by Dr. Masoud Acosta, found to have pancreatitis with duodenitis and questionable

perforation with air in the duodenal wall.  Infectious Disease consultation

requested for antibiotic use.



REVIEW OF SYSTEMS:  Revealed the patient [facility] [place] and he had nausea, vomiting and low-grade fevers.  No cough,

shortness of breath.



PAST MEDICAL HISTORY:  Significant for influenza A.



PAST SURGICAL HISTORY:  Noncontributory.



SOCIAL HISTORY:  Patient smokes tobacco, does not use alcohol, no drugs. 

No recent travel and [place]



ALLERGIES:  NO KNOWN ALLERGIES.



MEDICATIONS:  At home include Zofran, Elavil and Protonix.



PHYSICAL EXAMINATION:

GENERAL:  Patient is in bed.

VITAL SIGNS:  Temperature of 99.7, T-max is 101.8, heart rate of 117,

respiratory rate of 22, blood pressure is 150/80.

HEENT:  Unremarkable.

NECK:  Supple.

LUNGS:  Have decreased breath sounds.

HEART:  Normal S1 and S2.

ABDOMEN:  Tender but no rebound or guarding.  No masses.  No CVA

tenderness.



LABORATORY DATA:  Reveals the white count of 5.8, hemoglobin of 18,

platelets of 172.  Chemistries reveal BUN of 6, creatinine of 1.2.  LFTs

are elevated.  Urinalysis is noted.  Alcohol level is undetectable.  Blood

cultures have no growth.  Dr. Mooney, the gastroenterologist note is

reviewed and has a loculated air in the duodenal wall on the CAT scan.  Dr. Hayes's note is reviewed.  Patient also had an MRCP, results are pending. 

Chest x-ray, no active disease.  History and physical examination by Dr. Haeys is reviewed.  Patient also had an abdominal ultrasound, hepatic

steatosis.  Patient also had a elevated triglycerides at 868, which is down

to 224 and patient's glucose is 90.  Patient also with abdominal CAT scan

and abdominal CAT scan is reviewed and is noted.



ASSESSMENT AND PLAN:  This is a 28-year-old male with the history of

influenza, now presenting with fever of 101, tachycardia, dyspnea,

abdominal pain, sepsis with pancreatitis with an elevated triglyceride

levels of over 860 associated with duodenitis and question of perforation

with air in the duodenal wall.  Dr. Mooney, the gastroenterologist does

not believe the triglycerides of 862 may be the cause f the pancreatitis,

he believes it may be a primary duodenitis etiology that caused the

pancreatitis and patient also may appeared to have a metabolic syndrome

with the abdominal obesity, dyslipidemia, hypertension, although his blood

sugar at this time is not elevated.  We will order an hemoglobin A1c and

also as ordered by Dr. Hayes hepatitis profile and human immunodeficiency

virus and we will follow closely with you.  We will treat the patient with

Zosyn _____  and pending pan cultures and initial workup results.  Case

discussed with Gastroenterology, Dr. Mooney at length.  We will plan to

repeat a CAT scan.







__________________________________________

Francis English MD



DD:  05/20/2018 9:48:01

DT:  05/20/2018 21:05:47

Job # [id number]

## 2018-05-20 NOTE — PN
DATE:  05/19/2018



SUBJECTIVE:  The patient is a 28-year-old male.  Patient is seen and

examined at the bedside.  Girlfriend was sitting on the bedside also. 

According to him, he has had severe epigastric pain that is spreading to

his lower abdomen and not well controlled with Dilaudid.  He feels like the

pain is worsening and that he is more distended.  Denies nausea, vomiting. 

No fever.  No chills.  No diarrhea.  No hematuria or hematochezia.



PHYSICAL EXAMINATION:

VITAL SIGNS:  Temperature 99.5, pulse 86, respiratory rate 24, blood

pressure 145/111, pulse oximetry 95.

HEENT:  Head:  Normocephalic and atraumatic.  Eyes:  PERRLA.  Extraocular

muscles intact.  Conjunctivae clear.  Nose patent.  Mucous membranes moist.

NECK:  Supple.  No carotid bruit.  No JVD or thyromegaly.

CHEST:  Bilaterally symmetrical.

HEART:  S1, S2 positive.

LUNGS:  Clear to auscultation.

ABDOMEN:  Soft.  Bowel sounds present.  No organomegaly.

EXTREMITIES:  No edema.  No cyanosis.

NEUROLOGIC:  Patient is awake, alert.  Moving all four extremities.  No

focal deficit.



MEDICATIONS:  Elavil, Benadryl, Dilaudid, Ringer lactate, Zofran, Protonix.



LABORATORY DATA:  We do not have recent labs today, but I reviewed old

labs.



ASSESSMENT AND PLAN:  Mr. Yo Woodall is a 28-year-old male, came with

pancreatitis versus duodenitis.  Will repeat labs.  Monitoring the urine

output closely.  Starting pain medications.  Further discussion done with

Dr. Mooney and Surgical team.  Patient went for magnetic resonance

cholangiopancreatography today, but up to now, no dictation from Jesica

is found.  Patient is just getting ice chips   pain medication.  Last

CAT scan is reviewed by me.  According to Dr. Mooney, patient needs

endoscopy.  We will follow up.





__________________________________________

Vanda Hayes MD



DD:  05/19/2018 21:09:50

DT:  05/19/2018 23:00:21

Job # [id number]

MTDD

## 2018-05-20 NOTE — PN
DATE:  05/19/2018



SUBJECTIVE:  This patient was seen and evaluated today.  The patient still

complains of epigastric and right upper quadrant pain.



PHYSICAL EXAMINATION:

GENERAL:  Temperature is 101.8, blood pressure is 147/100, respirations 19,

O2 saturation is 97%.

HEENT:  Atraumatic, anicteric.

NECK:  Supple.

HEART:  S1, S2 heard.

LUNGS:  Bilateral air entry present.

ABDOMEN:  Soft, tenderness present at the epigastric right upper quadrant

area.  There is guarding present.  No rebound.  Bowel sounds present

normally.

EXTREMITIES:  No edema.  No cyanosis.

NEUROLOGIC:  Alert and oriented.  Moves all the extremities.



LABORATORY DATA:  His hemoglobin is 14.1, hematocrit 40.3, WBC 7.5,

platelets 114.  Chemistry shows his total bilirubin has gone up to 3.1,

direct bilirubin only 0.6.   AST 80, ALT 71.



IMPRESSION AND PLAN:  This is a 28-year-old patient admitted with acute

abdominal pain.  The patient was here with an abdominal pain admitted in

the past for similar complaints.  The patient did have an endoscopy done in

[facility], was found to have severe gastritis and he was advised to take

proton pump inhibitors.  The patient had acute worsening of the symptoms,

came to the ER.  The patient had a CAT done which was reviewed.  There was

found to be one small westbrook of loculated air noticed in the duodenal wall area.



The patient denies having any alcohol use.  The patient does have mildly

elevated triglyceride level at this time.  The patient had an elevated

total bilirubin, but the CT also shows inflammatory changes

involving the duodenum, and also

 pancreatic head area.  The etiology of whether

the duodenal inflammation primary or secondary to the severe pancreatitis or

primary is not clear.  But of also concern is the small speck of air which

is found in the duodenal wall.

LFT elevated . No gall stones found n sono

Clinical the patient has significant edema an inflammatort changes causing 
partial gastric out obstruction pattern



At the present time, the recommendation is;

1.  Continue increase the proton pump inhibitor to twice daily.

2.  Keep the patient n.p.o.

3.  Continue the antibiotics.  Follow up all the cultures.

4.  May consider repeating the endoscopy.   

5  Follow up of the magnetic resonance cholangiopancreatography.

6.  The patient does have mildly elevated triglycerides, not sure if it is

the sequelae of the acute pancreatitis.  Lipase of the patient is only

mildly elevated.  The patient's triglycerides was only mildly elevated at

This could be sequelae of the pancreatitis also.  We will await for

these.  

7 The patient may need a repeat endoscopy, which we will consider based on the 
clinical course.  The

patient also has a family history of Crohn's disease, he is concerned about

it, but it is not the time for colonoscopy.  This can be considered

electively.  



Main concern now is duodenitis, primary or secondary.  There

was a small westbrook of air, which is also a concern.  Continue the

antibiotics.  We will continue the proton pump inhibitors.



We will continue to closely follow up his care and suggest further

management based on the clinical course. 

__________________________________________

Gallo Mooney MD



DD:  05/19/2018 23:46:02

DT:  05/20/2018 1:52:15

Job # [id number]

MTDD

## 2018-05-20 NOTE — MRI
PROCEDURE:  Magnetic Resonance Cholangiopancreatography



HISTORY:

History: Abdominal pain 



COMPARISON:

Comparison is made to the previous CT of the abdomen and pelvis dated 

05/18/2018.



TECHNIQUE:

Multiplanar, multisequence MR images of the abdomen were obtained, 

including heavily T2 weighted MRCP images of the biliary system. 

Rotating maximum intensity projection images of the biliary system 

were generated.



FINDINGS:



MRCP:

The common bile duct is of a normal caliber.  No evidence of 

choledocholithiasis. No intrahepatic biliary ductal dilatation.



LIVER:

 Unremarkable.



GALLBLADDER:

Unremarkable.



SPLEEN:

Unremarkable.



PANCREAS:

There are interval worsening of inflammatory changes and fluid 

surrounding the pancreas consistent with acute pancreatitis. The main 

pancreatic duct is not dilated.



ADRENALS:

 Unremarkable.



KIDNEYS:

Unremarkable.



AORTA:

No aneurysm.



ASCITES:

Trace free fluid in the upper abdomen noted.



OTHER FINDINGS:

None.



IMPRESSION:

Findings consistent with acute pancreatitis. Questionable fluid 

collection adjacent to the pancreas may represent an early pseudo 

cyst formation. 



No evidence of choledocholithiasis. No evidence of cholelithiasis or 

acute cholecystitis.



Continuous follow-up reassessment is suggested. 



Preliminary report was submitted by virtual Radiology. .

## 2018-05-21 LAB
ALBUMIN SERPL-MCNC: 3 G/DL (ref 3–4.8)
ALBUMIN/GLOB SERPL: 1 {RATIO} (ref 1.1–1.8)
ALT SERPL-CCNC: 53 U/L (ref 7–56)
AST SERPL-CCNC: 47 U/L (ref 17–59)
BASOPHILS # BLD AUTO: 0.03 K/MM3 (ref 0–2)
BASOPHILS NFR BLD: 0.5 % (ref 0–3)
BUN SERPL-MCNC: 5 MG/DL (ref 7–21)
CALCIUM SERPL-MCNC: 8 MG/DL (ref 8.4–10.5)
EOSINOPHIL # BLD: 0.4 10*3/UL (ref 0–0.7)
EOSINOPHIL NFR BLD: 7 % (ref 1.5–5)
ERYTHROCYTE [DISTWIDTH] IN BLOOD BY AUTOMATED COUNT: 12.4 % (ref 11.5–14.5)
GFR NON-AFRICAN AMERICAN: > 60
GRANULOCYTES # BLD: 4.27 10*3/UL (ref 1.4–6.5)
GRANULOCYTES NFR BLD: 67.4 % (ref 50–68)
HEPATITIS A IGM: NEGATIVE
HEPATITIS B CORE AB: NEGATIVE
HEPATITIS C ANTIBODY: NEGATIVE
HGB BLD-MCNC: 11.3 G/DL (ref 14–18)
LYMPHOCYTES # BLD: 0.9 10*3/UL (ref 1.2–3.4)
LYMPHOCYTES NFR BLD AUTO: 13.6 % (ref 22–35)
MCH RBC QN AUTO: 32.5 PG (ref 25–35)
MCHC RBC AUTO-ENTMCNC: 33.7 G/DL (ref 31–37)
MCV RBC AUTO: 96.3 FL (ref 80–105)
MONOCYTES # BLD AUTO: 0.7 10*3/UL (ref 0.1–0.6)
MONOCYTES NFR BLD: 11.5 % (ref 1–6)
PLATELET # BLD: 109 10^3/UL (ref 120–450)
PMV BLD AUTO: 10.8 FL (ref 7–11)
RBC # BLD AUTO: 3.48 10^6/UL (ref 3.5–6.1)
WBC # BLD AUTO: 6.3 10^3/UL (ref 4.5–11)

## 2018-05-21 RX ADMIN — HYDROMORPHONE HYDROCHLORIDE PRN MG: 1 INJECTION, SOLUTION INTRAMUSCULAR; INTRAVENOUS; SUBCUTANEOUS at 04:05

## 2018-05-21 RX ADMIN — HYDROMORPHONE HYDROCHLORIDE PRN MG: 1 INJECTION, SOLUTION INTRAMUSCULAR; INTRAVENOUS; SUBCUTANEOUS at 06:13

## 2018-05-21 RX ADMIN — HYDROMORPHONE HYDROCHLORIDE PRN MG: 1 INJECTION, SOLUTION INTRAMUSCULAR; INTRAVENOUS; SUBCUTANEOUS at 23:56

## 2018-05-21 RX ADMIN — PIPERACILLIN AND TAZOBACTAM SCH MLS/HR: 3; .375 INJECTION, POWDER, LYOPHILIZED, FOR SOLUTION INTRAVENOUS; PARENTERAL at 00:20

## 2018-05-21 RX ADMIN — HYDROMORPHONE HYDROCHLORIDE PRN MG: 1 INJECTION, SOLUTION INTRAMUSCULAR; INTRAVENOUS; SUBCUTANEOUS at 01:59

## 2018-05-21 RX ADMIN — HYDROMORPHONE HYDROCHLORIDE PRN MG: 1 INJECTION, SOLUTION INTRAMUSCULAR; INTRAVENOUS; SUBCUTANEOUS at 00:20

## 2018-05-21 RX ADMIN — HYDROMORPHONE HYDROCHLORIDE PRN MG: 1 INJECTION, SOLUTION INTRAMUSCULAR; INTRAVENOUS; SUBCUTANEOUS at 21:54

## 2018-05-21 RX ADMIN — HYDROMORPHONE HYDROCHLORIDE PRN MG: 1 INJECTION, SOLUTION INTRAMUSCULAR; INTRAVENOUS; SUBCUTANEOUS at 10:29

## 2018-05-21 RX ADMIN — PIPERACILLIN AND TAZOBACTAM SCH MLS/HR: 3; .375 INJECTION, POWDER, LYOPHILIZED, FOR SOLUTION INTRAVENOUS; PARENTERAL at 17:19

## 2018-05-21 RX ADMIN — HYDROMORPHONE HYDROCHLORIDE PRN MG: 1 INJECTION, SOLUTION INTRAMUSCULAR; INTRAVENOUS; SUBCUTANEOUS at 19:25

## 2018-05-21 RX ADMIN — HYDROMORPHONE HYDROCHLORIDE PRN MG: 1 INJECTION, SOLUTION INTRAMUSCULAR; INTRAVENOUS; SUBCUTANEOUS at 17:16

## 2018-05-21 RX ADMIN — HYDROMORPHONE HYDROCHLORIDE PRN MG: 1 INJECTION, SOLUTION INTRAMUSCULAR; INTRAVENOUS; SUBCUTANEOUS at 12:18

## 2018-05-21 RX ADMIN — PIPERACILLIN AND TAZOBACTAM SCH MLS/HR: 3; .375 INJECTION, POWDER, LYOPHILIZED, FOR SOLUTION INTRAVENOUS; PARENTERAL at 05:07

## 2018-05-21 RX ADMIN — PIPERACILLIN AND TAZOBACTAM SCH MLS/HR: 3; .375 INJECTION, POWDER, LYOPHILIZED, FOR SOLUTION INTRAVENOUS; PARENTERAL at 23:56

## 2018-05-21 RX ADMIN — HYDROMORPHONE HYDROCHLORIDE PRN MG: 1 INJECTION, SOLUTION INTRAMUSCULAR; INTRAVENOUS; SUBCUTANEOUS at 08:06

## 2018-05-21 RX ADMIN — HYDROMORPHONE HYDROCHLORIDE PRN MG: 1 INJECTION, SOLUTION INTRAMUSCULAR; INTRAVENOUS; SUBCUTANEOUS at 14:59

## 2018-05-21 RX ADMIN — PIPERACILLIN AND TAZOBACTAM SCH MLS/HR: 3; .375 INJECTION, POWDER, LYOPHILIZED, FOR SOLUTION INTRAVENOUS; PARENTERAL at 12:19

## 2018-05-21 NOTE — CP.PCM.PN
Subjective





- Date & Time of Evaluation


Date of Evaluation: 05/21/18


Time of Evaluation: 11:10





- Subjective


Subjective: 





Abdominal is improving but still present, nausea is improving as well, no 

diarrhea but when he took the stool softener, he was able to pass gas with 

improved abdominal pain. No fevers.





Objective





- Vital Signs/Intake and Output


Vital Signs (last 24 hours): 


 











Temp Pulse Resp BP Pulse Ox


 


 99.3 F   69   20   140/88   97 


 


 05/21/18 14:00  05/21/18 14:00  05/21/18 14:00  05/21/18 14:00  05/21/18 14:00








Intake and Output: 


 











 05/21/18 05/21/18





 06:59 18:59


 


Intake Total 120 0


 


Output Total 1800 


 


Balance -1680 0














- Medications


Medications: 


 Current Medications





Acetaminophen (Tylenol 325mg Tab)  650 mg PO Q4H PRN


   PRN Reason: Temperature


   Last Admin: 05/20/18 22:03 Dose:  650 mg


Amitriptyline HCl (Elavil)  10 mg PO DAILY [facility]


   Last Admin: 05/21/18 10:29 Dose:  10 mg


Clonidine HCl (Catapres-Tts3 0.3 Mg/24 Hr)  1 patch TD Q7D@1000 [facility]


   Last Admin: 05/20/18 17:19 Dose:  1 patch


Docusate Sodium (Colace)  100 mg PO DAILY [facility]


   Last Admin: 05/21/18 10:28 Dose:  100 mg


Hydromorphone HCl (Dilaudid)  1 mg IVP Q2H PRN


   PRN Reason: Pain, severe (8-10)


   Last Admin: 05/21/18 17:16 Dose:  1 mg


Lactated Ringer's (Lactated Ringer's)  1,000 mls @ 150 mls/hr IV .Q6H40M [facility]


   Last Admin: 05/21/18 12:25 Dose:  150 mls/hr


Piperacillin Sod/Tazobactam Sod (Zosyn 3.375 In Ns 100ml)  100 mls @ 200 mls/hr 

IVPB Q6 [facility]


   PRN Reason: Protocol


   Stop: 05/29/18 12:01


   Last Admin: 05/21/18 17:19 Dose:  200 mls/hr


Ondansetron HCl (Zofran Inj)  4 mg IVP Q6H PRN


   PRN Reason: Nausea/Vomiting


   Last Admin: 05/20/18 04:29 Dose:  4 mg


Pantoprazole Sodium (Protonix Inj)  40 mg IVP Q12 [facility]


   Last Admin: 05/21/18 10:29 Dose:  40 mg











- Labs


Labs: 


 





 05/21/18 06:20 





 05/21/18 06:20 





 











PT  13.5 SECONDS (9.4-12.5)  H  05/18/18  13:05    


 


INR  1.17  (0.93-1.08)  H  05/18/18  13:05    














- Constitutional


Appears: Non-toxic





- Head Exam


Head Exam: NORMAL INSPECTION





- Neck Exam


Neck Exam: absent: Meningismus





- Respiratory Exam


Respiratory Exam: absent: Rales, Rhonchi





- Cardiovascular Exam


Cardiovascular Exam: +S1, +S2





- GI/Abdominal Exam


GI & Abdominal Exam: Soft, Tenderness (epigastric area).  absent: Distended, 

Firm, Guarding, Rigid, Rebound





Assessment and Plan





- Assessment and Plan (Free Text)


Plan: 





Assessment


Sepsis due to acute pancreatitis and duodenitis (with some air in the wall of 

the duodenum), slowly improving clinically


history of Influenza A infection





Plan


Continue Zosyn day 3 and will follow up further recommendations of GI - patient 

will probably need EGD and repeat CT scan


HIV test is non-reactive

## 2018-05-21 NOTE — CP.PCM.PN
<JohanaZara - Last Filed: 05/21/18 17:05>





Subjective





- Date & Time of Evaluation


Date of Evaluation: 05/21/18


Time of Evaluation: 10:56





- Subjective


Subjective: 





PGY-2 Progress note for Dr. Mooney's service





Patient seen and examined at bedside. No acute distress. Patient states that 

pain is about 20% better. He states that he was given stool softer yesterday 

and released a significant amount of gas. 





Objective





- Vital Signs/Intake and Output


Vital Signs (last 24 hours): 


 











Temp Pulse Resp BP Pulse Ox


 


 98.6 F   83   20   128/83   97 


 


 05/21/18 06:00  05/21/18 06:00  05/21/18 06:00  05/21/18 06:00  05/21/18 06:00








Intake and Output: 


 











 05/21/18 05/21/18





 06:59 18:59


 


Intake Total 120 


 


Output Total 1800 


 


Balance -1680 














- Medications


Medications: 


 Current Medications





Acetaminophen (Tylenol 325mg Tab)  650 mg PO Q4H PRN


   PRN Reason: Temperature


   Last Admin: 05/20/18 22:03 Dose:  650 mg


Amitriptyline HCl (Elavil)  10 mg PO DAILY [facility]


   Last Admin: 05/21/18 10:29 Dose:  10 mg


Clonidine HCl (Catapres-Tts3 0.3 Mg/24 Hr)  1 patch TD Q7D@1000 [facility]


   Last Admin: 05/20/18 17:19 Dose:  1 patch


Docusate Sodium (Colace)  100 mg PO DAILY [facility]


   Last Admin: 05/21/18 10:28 Dose:  100 mg


Hydromorphone HCl (Dilaudid)  1 mg IVP Q2H PRN


   PRN Reason: Pain, severe (8-10)


   Last Admin: 05/21/18 10:29 Dose:  1 mg


Lactated Ringer's (Lactated Ringer's)  1,000 mls @ 150 mls/hr IV .Q6H40M [facility]


   Last Admin: 05/21/18 02:03 Dose:  Not Given


Piperacillin Sod/Tazobactam Sod (Zosyn 3.375 In Ns 100ml)  100 mls @ 200 mls/hr 

IVPB Q6 [facility]


   PRN Reason: Protocol


   Stop: 05/29/18 12:01


   Last Admin: 05/21/18 05:07 Dose:  200 mls/hr


Ondansetron HCl (Zofran Inj)  4 mg IVP Q6H PRN


   PRN Reason: Nausea/Vomiting


   Last Admin: 05/20/18 04:29 Dose:  4 mg


Pantoprazole Sodium (Protonix Inj)  40 mg IVP Q12 [facility]


   Last Admin: 05/21/18 10:29 Dose:  40 mg











- Labs


Labs: 


 





 05/21/18 06:20 





 05/21/18 06:20 





 











PT  13.5 SECONDS (9.4-12.5)  H  05/18/18  13:05    


 


INR  1.17  (0.93-1.08)  H  05/18/18  13:05    














- Constitutional


Appears: No Acute Distress





- Head Exam


Head Exam: ATRAUMATIC, NORMOCEPHALIC





- Eye Exam


Eye Exam: EOMI, Normal appearance





- ENT Exam


ENT Exam: Mucous Membranes Dry





- Respiratory Exam


Respiratory Exam: Clear to Ausculation Bilateral, NORMAL BREATHING PATTERN.  

absent: Respiratory Distress





- Cardiovascular Exam


Cardiovascular Exam: REGULAR RHYTHM





- GI/Abdominal Exam


GI & Abdominal Exam: Soft, Tenderness (epigastric).  absent: Distended, Firm, 

Guarding, Hernia, Mass





- Extremities Exam


Extremities Exam: Normal Inspection.  absent: Pedal Edema, Tenderness





- Neurological Exam


Neurological Exam: Alert, Awake, Normal Gait, Oriented x3





- Psychiatric Exam


Psychiatric exam: Normal Affect, Normal Mood





- Skin


Skin Exam: Dry, Intact, Normal Color, Warm





Assessment and Plan





- Assessment and Plan (Free Text)


Assessment: 





27 yo male presented with acute abd pain


- pancreatitis, duodenitis


- h/o gastritis


Plan: 


- Not clear whether etiology of duodenal inflammation is primary to secondary 

to severe pancreatitis 


- CT abd pel showed small westbrook of loculated air noted


- MRCP showed pancreatits questionable pseudocyst, no evidence of 

choledocholitheasis


- consider repeat CT abd.pelvis with PO contrast


- patient will eventually need endoscopy, once inflammation improves


- NPO, consider advancing tomorrow





case seen and discussed with Dr. Mooney 








<Gallo Mooney - Last Filed: 05/22/18 00:03>





Objective





- Vital Signs/Intake and Output


Vital Signs (last 24 hours): 


 











Temp Pulse Resp BP Pulse Ox


 


 101.7 F H  79   18   156/102 H  98 


 


 05/21/18 22:33  05/21/18 22:33  05/21/18 22:33  05/21/18 22:33  05/21/18 22:33








Intake and Output: 


 











 05/21/18 05/22/18





 18:59 06:59


 


Intake Total 0 


 


Balance 0 














- Medications


Medications: 


 Current Medications





Acetaminophen (Tylenol 325mg Tab)  650 mg PO Q4H PRN


   PRN Reason: Temperature


   Last Admin: 05/21/18 22:00 Dose:  650 mg


Amitriptyline HCl (Elavil)  10 mg PO DAILY [facility]


   Last Admin: 05/21/18 10:29 Dose:  10 mg


Clonidine HCl (Catapres-Tts3 0.3 Mg/24 Hr)  1 patch TD Q7D@1000 [facility]


   Last Admin: 05/20/18 17:19 Dose:  1 patch


Docusate Sodium (Colace)  100 mg PO DAILY [facility]


   Last Admin: 05/21/18 10:28 Dose:  100 mg


Hydromorphone HCl (Dilaudid)  1 mg IVP Q2H PRN


   PRN Reason: Pain, severe (8-10)


   Last Admin: 05/21/18 23:56 Dose:  1 mg


Lactated Ringer's (Lactated Ringer's)  1,000 mls @ 150 mls/hr IV .Q6H40M [facility]


   Last Admin: 05/21/18 21:55 Dose:  150 mls/hr


Piperacillin Sod/Tazobactam Sod (Zosyn 3.375 In Ns 100ml)  100 mls @ 200 mls/hr 

IVPB Q6 [facility]


   PRN Reason: Protocol


   Stop: 05/29/18 12:01


   Last Admin: 05/21/18 23:56 Dose:  200 mls/hr


Ondansetron HCl (Zofran Inj)  4 mg IVP Q6H PRN


   PRN Reason: Nausea/Vomiting


   Last Admin: 05/21/18 19:30 Dose:  4 mg


Pantoprazole Sodium (Protonix Inj)  40 mg IVP Q12 [facility]


   Last Admin: 05/21/18 21:55 Dose:  40 mg











- Labs


Labs: 


 





 05/21/18 06:20 





 05/21/18 06:20 





 











PT  13.5 SECONDS (9.4-12.5)  H  05/18/18  13:05    


 


INR  1.17  (0.93-1.08)  H  05/18/18  13:05    














Attending/Attestation





- Attestation


I have personally seen and examined this patient.: Yes


I have fully participated in the care of the patient.: Yes


I have reviewed all pertinent clinical information, including history, physical 

exam and plan: Yes


Notes (Text): 


p


05/22/18 00:03

## 2018-05-22 RX ADMIN — HYDROMORPHONE HYDROCHLORIDE PRN MG: 1 INJECTION, SOLUTION INTRAMUSCULAR; INTRAVENOUS; SUBCUTANEOUS at 08:02

## 2018-05-22 RX ADMIN — HYDROMORPHONE HYDROCHLORIDE PRN MG: 1 INJECTION, SOLUTION INTRAMUSCULAR; INTRAVENOUS; SUBCUTANEOUS at 04:07

## 2018-05-22 RX ADMIN — PIPERACILLIN AND TAZOBACTAM SCH MLS/HR: 3; .375 INJECTION, POWDER, LYOPHILIZED, FOR SOLUTION INTRAVENOUS; PARENTERAL at 05:48

## 2018-05-22 RX ADMIN — HYDROMORPHONE HYDROCHLORIDE PRN MG: 1 INJECTION, SOLUTION INTRAMUSCULAR; INTRAVENOUS; SUBCUTANEOUS at 19:41

## 2018-05-22 RX ADMIN — HYDROMORPHONE HYDROCHLORIDE PRN MG: 1 INJECTION, SOLUTION INTRAMUSCULAR; INTRAVENOUS; SUBCUTANEOUS at 15:04

## 2018-05-22 RX ADMIN — PIPERACILLIN AND TAZOBACTAM SCH MLS/HR: 3; .375 INJECTION, POWDER, LYOPHILIZED, FOR SOLUTION INTRAVENOUS; PARENTERAL at 12:38

## 2018-05-22 RX ADMIN — HYDROMORPHONE HYDROCHLORIDE PRN MG: 1 INJECTION, SOLUTION INTRAMUSCULAR; INTRAVENOUS; SUBCUTANEOUS at 10:20

## 2018-05-22 RX ADMIN — HYDROMORPHONE HYDROCHLORIDE PRN MG: 1 INJECTION, SOLUTION INTRAMUSCULAR; INTRAVENOUS; SUBCUTANEOUS at 12:26

## 2018-05-22 RX ADMIN — PIPERACILLIN AND TAZOBACTAM SCH MLS/HR: 3; .375 INJECTION, POWDER, LYOPHILIZED, FOR SOLUTION INTRAVENOUS; PARENTERAL at 17:07

## 2018-05-22 RX ADMIN — HYDROMORPHONE HYDROCHLORIDE PRN MG: 1 INJECTION, SOLUTION INTRAMUSCULAR; INTRAVENOUS; SUBCUTANEOUS at 05:48

## 2018-05-22 RX ADMIN — HYDROMORPHONE HYDROCHLORIDE PRN MG: 1 INJECTION, SOLUTION INTRAMUSCULAR; INTRAVENOUS; SUBCUTANEOUS at 21:57

## 2018-05-22 RX ADMIN — HYDROMORPHONE HYDROCHLORIDE PRN MG: 1 INJECTION, SOLUTION INTRAMUSCULAR; INTRAVENOUS; SUBCUTANEOUS at 01:47

## 2018-05-22 RX ADMIN — HYDROMORPHONE HYDROCHLORIDE PRN MG: 1 INJECTION, SOLUTION INTRAMUSCULAR; INTRAVENOUS; SUBCUTANEOUS at 17:07

## 2018-05-22 NOTE — PN
DATE:  05/22/2018



SUBJECTIVE:  The patient is in bed, in no acute distress, was seen earlier

today, and is still having abdominal pain.  He is still having low-grade

fevers.



PHYSICAL EXAMINATION:

VITAL SIGNS:  Temperature is 98, T-max is 101, blood pressure is 140/100,

respiratory rate of 20, heart rate of 65.

HEENT:  Unremarkable.

NECK:  Supple.

LUNGS:  Have decreased breath sounds.

HEART:  Normal S1, S2.

ABDOMEN:  Soft and nontender.



LABORATORY DATA:  Laboratory examination reveals a white count of 6.3,

hemoglobin of 11, platelets of 109.  Chemistries reveals a BUN of 5,

creatinine of 1.1.  Microbiology reveals the patient's blood cultures are

negative.  Urine cultures are negative.  Review of orders reveals the

patient is on Zosyn.  The patient's CAT scan of the pancreas from today is

reviewed with a diffused pancreatitis, with swelling and a moderate amount

of peripancreatic fluid surrounding the body and tail.  There is no

evidence of pseudocyst formation.



ASSESSMENT AND PLAN:  He is a 28-year-old with sepsis, with acute

pancreatitis and duodenitis, with air in the wall of the duodenum slowly

improving, with a history of influenza A infection on day #4 of Zosyn.  We

will continue to do current antibiotic therapy.  We will follow with you.





__________________________________________

Francis English MD



DD:  05/22/2018 17:44:34

DT:  05/22/2018 17:46:30

Job # [id number]

## 2018-05-22 NOTE — CP.PCM.PN
<Zara Vaughn - Last Filed: 05/23/18 07:22>





Subjective





- Date & Time of Evaluation


Date of Evaluation: 05/22/18


Time of Evaluation: 10:00





- Subjective


Subjective: 








PGY-2 Progress note for Dr. Mooney's service





Patient seen and examined at bedside. No acute distress. Patient states that 

pain is about 7/10 after pain medications. Nurse reports fever of 101.7 

overnight. Patient continues to have fevers. He states that he had 2 episodes 

of lose BM overnight, he states that one was uncontrolled and he soiled 

himself. He also reports cotton mouth and swelling of the ankles. 





Objective





- Vital Signs/Intake and Output


Vital Signs (last 24 hours): 


 











Temp Pulse Resp BP Pulse Ox


 


 99.2 F   67   20   141/89   99 


 


 05/22/18 06:00  05/22/18 06:00  05/22/18 06:00  05/22/18 06:00  05/22/18 06:00











- Medications


Medications: 


 Current Medications





Acetaminophen (Tylenol 325mg Tab)  650 mg PO Q4H PRN


   PRN Reason: Temperature


   Last Admin: 05/21/18 22:00 Dose:  650 mg


Amitriptyline HCl (Elavil)  10 mg PO DAILY [facility]


   Last Admin: 05/22/18 10:37 Dose:  Not Given


Clonidine HCl (Catapres-Tts3 0.3 Mg/24 Hr)  1 patch TD Q7D@1000 [facility]


   Last Admin: 05/20/18 17:19 Dose:  1 patch


Docusate Sodium (Colace)  100 mg PO DAILY [facility]


   Last Admin: 05/22/18 10:19 Dose:  100 mg


Hydromorphone HCl (Dilaudid)  1 mg IVP Q2H PRN


   PRN Reason: Pain, severe (8-10)


   Last Admin: 05/22/18 10:20 Dose:  1 mg


Lactated Ringer's (Lactated Ringer's)  1,000 mls @ 150 mls/hr IV .Q6H40M [facility]


   Last Admin: 05/22/18 04:09 Dose:  150 mls/hr


Piperacillin Sod/Tazobactam Sod (Zosyn 3.375 In Ns 100ml)  100 mls @ 200 mls/hr 

IVPB Q6 [facility]


   PRN Reason: Protocol


   Stop: 05/29/18 12:01


   Last Admin: 05/22/18 05:48 Dose:  200 mls/hr


Ondansetron HCl (Zofran Inj)  4 mg IVP Q6H PRN


   PRN Reason: Nausea/Vomiting


   Last Admin: 05/21/18 19:30 Dose:  4 mg


Pantoprazole Sodium (Protonix Inj)  40 mg IVP Q12 [facility]


   Last Admin: 05/22/18 10:20 Dose:  40 mg











- Labs


Labs: 


 





 05/21/18 06:20 





 05/21/18 06:20 





 











PT  13.5 SECONDS (9.4-12.5)  H  05/18/18  13:05    


 


INR  1.17  (0.93-1.08)  H  05/18/18  13:05    














- Constitutional


Appears: No Acute Distress





- Head Exam


Head Exam: ATRAUMATIC, NORMOCEPHALIC





- Eye Exam


Eye Exam: EOMI, Normal appearance





- ENT Exam


ENT Exam: Mucous Membranes Dry





- Respiratory Exam


Respiratory Exam: Clear to Ausculation Bilateral, NORMAL BREATHING PATTERN.  

absent: Decreased Breath Sounds, Prolonged Expiratory Phase, Rales, Rhonchi, 

Wheezes, Respiratory Distress, Stridor





- Cardiovascular Exam


Cardiovascular Exam: REGULAR RHYTHM, +S1, +S2.  absent: Bradycardia, Tachycardia

, Diastolic murmur, Murmur





- GI/Abdominal Exam


GI & Abdominal Exam: Soft, Tenderness (epigastric), Hypoactive Bowel Sounds.  

absent: Distended, Firm, Guarding





- Back Exam


Back Exam: absent: CVA tenderness (L), CVA tenderness (R)





- Neurological Exam


Neurological Exam: Alert, Awake, Oriented x3





- Skin


Skin Exam: Dry, Intact, Normal Color, Warm





Assessment and Plan





- Assessment and Plan (Free Text)


Assessment: 





27 yo male presented with acute abd pain


- pancreatitis, duodenitis


- h/o gastritis





Plan: 


- Not clear whether etiology of duodenal inflammation is primary to secondary 

to severe pancreatitis 


- CT abd pel showed possible small westbrook of loculated air noted


- MRCP showed pancreatits questionable pseudocyst, no evidence of 

choledocholitheasis


- will order repeat CT with pancreatic protocol


- patient will eventually need endoscopy, once inflammation improves


- NPO


- continue abx, zosyn, ID is following 





case seen and discussed with Dr. Mooney 





<Gallo Mooney V - Last Filed: 05/23/18 23:59>





Objective





- Vital Signs/Intake and Output


Vital Signs (last 24 hours): 


 











Temp Pulse Resp BP Pulse Ox


 


 98.3 F   58 L  18   157/113 H  97 


 


 05/23/18 22:00  05/23/18 22:00  05/23/18 22:00  05/23/18 22:00  05/23/18 22:00








Intake and Output: 


 











 05/23/18 05/24/18





 18:59 06:59


 


Output Total 1200 


 


Balance -1200 














- Medications


Medications: 


 Current Medications





Acetaminophen (Tylenol 325mg Tab)  650 mg PO Q4H PRN


   PRN Reason: Temperature


   Last Admin: 05/21/18 22:00 Dose:  650 mg


Clonidine HCl (Catapres-Tts3 0.3 Mg/24 Hr)  1 patch TD Q7D@1000 [facility]


   Last Admin: 05/20/18 17:19 Dose:  1 patch


Docusate Sodium (Colace)  100 mg PO DAILY [facility]


   Last Admin: 05/23/18 09:28 Dose:  100 mg


Hydromorphone HCl (Dilaudid)  1 mg IVP Q2H PRN


   PRN Reason: Pain, severe (8-10)


   Last Admin: 05/23/18 23:28 Dose:  1 mg


Piperacillin Sod/Tazobactam Sod (Zosyn 3.375 In Ns 100ml)  100 mls @ 200 mls/hr 

IVPB Q6 [facility]


   PRN Reason: Protocol


   Stop: 05/29/18 12:01


   Last Admin: 05/23/18 23:28 Dose:  200 mls/hr


Lactated Ringer's (Lactated Ringer's)  1,000 mls @ 20 mls/hr IV .Q24H [facility]


   Last Admin: 05/23/18 17:33 Dose:  20 mls/hr


Amino Acids/Electrolytes/Dextrose (Clinimix 4.25/5 % "E" (2000 [facility]))  2,000 mls 

@ 83.333 mls/hr IV .Q24H [facility]


   Last Admin: 05/23/18 17:32 Dose:  83.333 mls/hr


Ondansetron HCl (Zofran Inj)  4 mg IVP Q6H PRN


   PRN Reason: Nausea/Vomiting


   Last Admin: 05/23/18 15:14 Dose:  4 mg


Pantoprazole Sodium (Protonix Inj)  40 mg IVP Q12 [facility]


   Last Admin: 05/23/18 21:34 Dose:  40 mg











- Labs


Labs: 


 





 05/21/18 06:20 





 05/21/18 06:20 





 











PT  13.5 SECONDS (9.4-12.5)  H  05/18/18  13:05    


 


INR  1.17  (0.93-1.08)  H  05/18/18  13:05    














Attending/Attestation





- Attestation


I have personally seen and examined this patient.: Yes


I have fully participated in the care of the patient.: Yes


I have reviewed all pertinent clinical information, including history, physical 

exam and plan: Yes


Notes (Text): 


This is an addendum to GI progress  report dictated by the Medical Resident.The 

patient was seen and examined earlier.  Medical records, lab studies, imagings 

were reviewed.  Last 24 hours events reviewed.  Agreed with the above treatment 

plan as outlined in Medical Resident 's notes the with the addition of the 

following 


05/23/18 23:59

## 2018-05-22 NOTE — PN
DATE:  05/21/2018



SUBJECTIVE:  Patient is a 28 years old male.  Patient was seen and examined

at bedside on 05/21/2018, still complaining about abdominal pain, even if

getting a little bit better, has flatus last night.  After flatus, he felt

a little bit better, but still having abdominal pain, had every night since

getting fever.   at that time he had 100 plus fever, took a stool

softener and felt better after that.  Seen by Dr. Mooney.



REVIEW OF SYSTEMS:  A 12-point review of systems is negative except above.



PHYSICAL EXAMINATION:

VITAL SIGNS:  Temperature 101.7, T-max is 101, pulse is 79, blood pressure

156/102, respiratory rate 18.

HEENT:  Head is normocephalic and atraumatic.  Eyes, PERRLA.  Extraocular

muscles intact.  Conjunctivae clear.  Nose  patent.

NECK:  Supple.  No carotid bruits.  No JVD or thyromegaly.

CHEST:  Bilaterally symmetrical.

HEART:  S1 and S2 positive.

LUNGS:  Clear to auscultation.

ABDOMEN:  Soft.  Bowel sounds present.  No organomegaly.

EXTREMITIES:  No edema.  No cyanosis.

NEUROLOGIC:  Patient is awake and alert.  Moving all four extremities.  No

focal deficit.



MEDICATIONS:  Clonidine patch, Colace, Dilaudid, Elavil, lactated Ringer's,

Protonix, Tylenol, Zofran, Zosyn.



LABORATORY DATA:  White blood cells 6.3, hemoglobin 11.3.  On admission,

his hemoglobin was 18, hematocrit 58.5, platelets 109.  Sodium 135,

potassium 3.4, BUN 5, creatinine 1.1, calcium 8.



ASSESSMENT AND PLAN:  Mr. Yo Woodall is a 28-year-old male with anemia;

hypokalemia, replaced; hypochloremia; hypocalcemia, replaced; abnormal

liver function test; hypertriglyceridemia; proteinuria; ketonuria;

hematuria, rule out urinary tract infection.  Toxicology  ethnol  less than

10.  Hepatitis profile is nonreactive.







__________________________________________

Vanda Hayes MD



DD:  05/22/2018 1:11:10

DT:  05/22/2018 3:27:28

Job # [id number]

[facility]

## 2018-05-22 NOTE — CT
PROCEDURE:  CT Abdomen with and without contrast



HISTORY:

fever, pancreatitis



COMPARISON:

CT abdomen 05/18/2018



TECHNIQUE:

Contrast dose: 150 cc of Omni 350



Radiation dose:



Total exam DLP = 1611 mGy-cm.



This CT exam was performed using one or more of the following dose 

reduction techniques: Automated exposure control, adjustment of the 

mA and/or kV according to patient size, and/or use of iterative 

reconstruction technique. 



The study was performed precontrast in 2 phases of postcontrast 



FINDINGS:



LOWER THORAX:

Unremarkable. 



LIVER:

Fatty infiltration of the liver 



GALLBLADDER AND BILE DUCTS:

Unremarkable. 



PANCREAS:

The previous study showed edema and inflammation around the 

pancreatic head in the groove adjacent to the duodenum. The study now 

shows more diffuse pancreatitis with swelling and edema and a 

moderate amount of peripancreatic fluid surrounding the body and 

tail. There is no evidence of pseudocyst formation.



SPLEEN:

Unremarkable. 



ADRENALS:

Unremarkable. No mass. 



KIDNEYS AND URETERS:

Unremarkable. No hydronephrosis. No solid mass. 



VASCULATURE:

Unremarkable. No aortic aneurysm. 



BOWEL:

Unremarkable. No obstruction. No gross mural thickening. 



APPENDIX:

Normal appendix. 



PERITONEUM:

Unremarkable. No free fluid. No free air. 



LYMPH NODES:

Unremarkable. No enlarged lymph nodes. 



BLADDER:

Unremarkable. 



REPRODUCTIVE:

Unremarkable. 



BONES:

No acute fracture. 



OTHER FINDINGS:

None.



IMPRESSION:

 Diffuse pancreatitis with swelling  and a moderate amount of 

peripancreatic fluid surrounding the body and tail. There is no 

evidence of pseudocyst formation

## 2018-05-23 PROCEDURE — 3E0336Z INTRODUCTION OF NUTRITIONAL SUBSTANCE INTO PERIPHERAL VEIN, PERCUTANEOUS APPROACH: ICD-10-PCS | Performed by: INTERNAL MEDICINE

## 2018-05-23 RX ADMIN — HYDROMORPHONE HYDROCHLORIDE PRN MG: 1 INJECTION, SOLUTION INTRAMUSCULAR; INTRAVENOUS; SUBCUTANEOUS at 23:28

## 2018-05-23 RX ADMIN — HYDROMORPHONE HYDROCHLORIDE PRN MG: 1 INJECTION, SOLUTION INTRAMUSCULAR; INTRAVENOUS; SUBCUTANEOUS at 15:05

## 2018-05-23 RX ADMIN — PIPERACILLIN AND TAZOBACTAM SCH MLS/HR: 3; .375 INJECTION, POWDER, LYOPHILIZED, FOR SOLUTION INTRAVENOUS; PARENTERAL at 05:55

## 2018-05-23 RX ADMIN — HYDROMORPHONE HYDROCHLORIDE PRN MG: 1 INJECTION, SOLUTION INTRAMUSCULAR; INTRAVENOUS; SUBCUTANEOUS at 05:52

## 2018-05-23 RX ADMIN — HYDROMORPHONE HYDROCHLORIDE PRN MG: 1 INJECTION, SOLUTION INTRAMUSCULAR; INTRAVENOUS; SUBCUTANEOUS at 12:04

## 2018-05-23 RX ADMIN — HYDROMORPHONE HYDROCHLORIDE PRN MG: 1 INJECTION, SOLUTION INTRAMUSCULAR; INTRAVENOUS; SUBCUTANEOUS at 17:33

## 2018-05-23 RX ADMIN — HYDROMORPHONE HYDROCHLORIDE PRN MG: 1 INJECTION, SOLUTION INTRAMUSCULAR; INTRAVENOUS; SUBCUTANEOUS at 09:29

## 2018-05-23 RX ADMIN — HYDROMORPHONE HYDROCHLORIDE PRN MG: 1 INJECTION, SOLUTION INTRAMUSCULAR; INTRAVENOUS; SUBCUTANEOUS at 19:33

## 2018-05-23 RX ADMIN — HYDROMORPHONE HYDROCHLORIDE PRN MG: 1 INJECTION, SOLUTION INTRAMUSCULAR; INTRAVENOUS; SUBCUTANEOUS at 00:15

## 2018-05-23 RX ADMIN — PIPERACILLIN AND TAZOBACTAM SCH MLS/HR: 3; .375 INJECTION, POWDER, LYOPHILIZED, FOR SOLUTION INTRAVENOUS; PARENTERAL at 12:05

## 2018-05-23 RX ADMIN — HYDROMORPHONE HYDROCHLORIDE PRN MG: 1 INJECTION, SOLUTION INTRAMUSCULAR; INTRAVENOUS; SUBCUTANEOUS at 21:33

## 2018-05-23 RX ADMIN — PIPERACILLIN AND TAZOBACTAM SCH MLS/HR: 3; .375 INJECTION, POWDER, LYOPHILIZED, FOR SOLUTION INTRAVENOUS; PARENTERAL at 00:15

## 2018-05-23 RX ADMIN — PIPERACILLIN AND TAZOBACTAM SCH MLS/HR: 3; .375 INJECTION, POWDER, LYOPHILIZED, FOR SOLUTION INTRAVENOUS; PARENTERAL at 23:28

## 2018-05-23 RX ADMIN — PIPERACILLIN AND TAZOBACTAM SCH MLS/HR: 3; .375 INJECTION, POWDER, LYOPHILIZED, FOR SOLUTION INTRAVENOUS; PARENTERAL at 17:32

## 2018-05-23 NOTE — PN
DATE:  05/20/2018



SUBJECTIVE:  Patient is a 28-year-old male.  Patient was seen and examined

on 05/20/2018, bedside.  Still complaining about abdominal pain.  Hungry,

want to eat, having ice chips.  No nausea or vomiting.  No diarrhea.  No

hematuria or hematochezia.  No headache.  No dizziness.  Complaining about

abdominal pain.  Patient was seen in [facility] who has

nausea, vomiting, and low-grade fever.



PHYSICAL EXAMINATION:

VITAL SIGNS:  Temperature 99.7, T-max 101.8, heart rate of 117, respiratory

rate 22, blood pressure 150/80.

HEENT:  Head:  Normocephalic and atraumatic.  Eyes:  PERRLA.  Extraocular

muscles intact.  Conjunctivae clear.  Nose patent.  Mucous membranes moist.

NECK:  Supple.  No carotid bruit.  No JVD or thyromegaly.

CHEST:  Bilaterally symmetrical.

HEART:  S1 and S2 positive.

LUNGS:  Clear to auscultation.

ABDOMEN:  Soft.  Bowel sounds present.  No organomegaly.

EXTREMITIES:  No edema.  No cyanosis.

NEUROLOGIC:  Patient is awake and alert.  Moving all four extremities.  No

focal deficit.



LABORATORY DATA:  White blood cells 7.9, hemoglobin 13.7, hematocrit 39.4,

platelets 118.  Sodium 137, potassium 3.4, BUN 6, creatinine 1.2, calcium

8, and total bilirubin 2.8.



ASSESSMENT AND PLAN:  Mr. Yo Woodall is a 28-year-old male with anemia,

thrombocytopenia, hypokalemia, hypocalcemia, renal insufficiency,

hypertriglyceridemia, had pancreatitis, duodenitis, gastritis with a

history of influenza, now presented with temperature 101, tachycardia,

dyspnea, abdominal pain, sepsis with pancreatitis with an elevated

triglyceride level of over 860 associated with duodenitis and question of

perforation with air in the duodenal wall.  Dr. Mooney, the

gastroenterologist does not believe the triglyceride of 862 may be cause of

the pancreatitis.  He believes it may be primary duodenitis etiology that

caused the pancreatitis and patient also may appear to have metabolic

syndrome with the abdominal obesity, dyslipidemia, hypertension, although

his blood sugar at this time is not elevated yet.  Patient then ordered a

hemoglobin A1c and also as ordered by Dr. Hayes hepatitis profile and

human immunodeficiency syndrome.  We will continue Zosyn and depend on pan

cultures and initial workup results.  Length of time discussion done. 

Repeat labs.  We will follow.





__________________________________________

Vanda Hayes MD



DD:  05/23/2018 0:39:45

DT:  05/23/2018 1:17:18

Job # [id number]

## 2018-05-23 NOTE — PN
DATE:  05/23/2018



SUBJECTIVE:  The patient is in bed, in no acute distress.  The patient is

seen earlier today.  The patient is seen in room 560.  The patient had an

uneventful night.  No fevers.  No chills.



PHYSICAL EXAMINATION:

VITAL SIGNS:  Temperature of 98, blood pressure is 140/90, respiratory rate

of 16.

HEENT:  Unremarkable.

NECK:  Supple.

LUNGS:  Have decreased breath sounds.

HEART:  Normal S1 and S2.



LABORATORY DATA:  Reveals a white count of 6.3, hemoglobin of 11, platelets

of 109.  Chemistries reveals BUN of 5, creatinine of 1.1.  Blood cultures

are negative.



ASSESSMENT AND PLAN:  A 28-year-old male who was seen earlier with sepsis,

acute pancreatitis and duodenitis with air in the wall of the duodenum,

slowly improving with a history of influenza A, today day #5 of Zosyn was

treated empirically.  Dr. Hayes's note is reviewed.  We will follow with

you.





__________________________________________

Francis English MD



DD:  05/23/2018 10:56:43

DT:  05/23/2018 13:32:46

Job # [id number] Reggie Erickson To Sent  10/20/2017  8:13 PM   Feeling fine but cough is persistent. Taking mucinex day and night. Suggestions?

## 2018-05-23 NOTE — CP.PCM.PN
<Zara Vaughn - Last Filed: 05/23/18 15:41>





Subjective





- Date & Time of Evaluation


Date of Evaluation: 05/23/18


Time of Evaluation: 09:00





- Subjective


Subjective: 





PGY-2 Progress note for Dr. Mooney's service





Patient seen and examined at bedside. No acute distress, patient is sleeping. 

Patient states that he required pain medication twice overnight and was able to 

sleep. Nurse reports no acute events overnight, no fevers overnight.. 





Objective





- Vital Signs/Intake and Output


Vital Signs (last 24 hours): 


 











Temp Pulse Resp BP Pulse Ox


 


 98.4 F   61   20   144/98 H  96 


 


 05/23/18 06:00  05/23/18 06:00  05/23/18 06:00  05/23/18 06:00  05/23/18 06:00








Intake and Output: 


 











 05/23/18 05/23/18





 06:59 18:59


 


Intake Total 0 


 


Output Total 900 


 


Balance -900 














- Medications


Medications: 


 Current Medications





Acetaminophen (Tylenol 325mg Tab)  650 mg PO Q4H PRN


   PRN Reason: Temperature


   Last Admin: 05/21/18 22:00 Dose:  650 mg


Amitriptyline HCl (Elavil)  10 mg PO DAILY [facility]


   Last Admin: 05/23/18 09:42 Dose:  Not Given


Clonidine HCl (Catapres-Tts3 0.3 Mg/24 Hr)  1 patch TD Q7D@1000 [facility]


   Last Admin: 05/20/18 17:19 Dose:  1 patch


Docusate Sodium (Colace)  100 mg PO DAILY [facility]


   Last Admin: 05/23/18 09:28 Dose:  100 mg


Hydromorphone HCl (Dilaudid)  1 mg IVP Q2H PRN


   PRN Reason: Pain, severe (8-10)


   Last Admin: 05/23/18 09:29 Dose:  1 mg


Piperacillin Sod/Tazobactam Sod (Zosyn 3.375 In Ns 100ml)  100 mls @ 200 mls/hr 

IVPB Q6 [facility]


   PRN Reason: Protocol


   Stop: 05/29/18 12:01


   Last Admin: 05/23/18 05:55 Dose:  200 mls/hr


Lactated Ringer's (Lactated Ringer's)  1,000 mls @ 20 mls/hr IV .Q24H [facility]


Ondansetron HCl (Zofran Inj)  4 mg IVP Q6H PRN


   PRN Reason: Nausea/Vomiting


   Last Admin: 05/22/18 19:45 Dose:  4 mg


Pantoprazole Sodium (Protonix Inj)  40 mg IVP Q12 [facility]


   Last Admin: 05/23/18 09:28 Dose:  40 mg











- Labs


Labs: 


 





 05/21/18 06:20 





 05/21/18 06:20 





 











PT  13.5 SECONDS (9.4-12.5)  H  05/18/18  13:05    


 


INR  1.17  (0.93-1.08)  H  05/18/18  13:05    














- Constitutional


Appears: No Acute Distress





- Head Exam


Head Exam: ATRAUMATIC, NORMOCEPHALIC





- Eye Exam


Eye Exam: EOMI, Normal appearance





- ENT Exam


ENT Exam: Mucous Membranes Moist





- Respiratory Exam


Respiratory Exam: NORMAL BREATHING PATTERN.  absent: Respiratory Distress





- GI/Abdominal Exam


GI & Abdominal Exam: Soft, Tenderness.  absent: Distended, Firm, Guarding





- Extremities Exam


Extremities Exam: Normal Inspection





- Neurological Exam


Neurological Exam: Alert, Awake, Oriented x3





- Skin


Skin Exam: Dry, Intact, Normal Color, Warm





Assessment and Plan





- Assessment and Plan (Free Text)


Assessment: 


27 yo male presented with acute abd pain


- pancreatitis, duodenitis


- h/o gastritis








Plan: 





- Not clear whether etiology of duodenal inflammation is primary to secondary 

to severe pancreatitis 


- CT abd pel showed possible small [place] of loculated air noted


- MRCP showed pancreatits questionable pseudocyst, no evidence of 

choledocholitheasis


- CT with pancreatic protocol showed diffuse pancreatitis with swelling and 

moderate amount of peripancreatic fluid, no signs of pseudocyst


- patient will eventually need endoscopy, once inflammation improves


- NPO, will start PPN


- continue abx, zosyn, ID is following 





case seen and discussed with Dr. Mooney 








<Gallo Mooney - Last Filed: 05/23/18 23:43>





Objective





- Vital Signs/Intake and Output


Vital Signs (last 24 hours): 


 











Temp Pulse Resp BP Pulse Ox


 


 98.3 F   58 L  18   157/113 H  97 


 


 05/23/18 22:00  05/23/18 22:00  05/23/18 22:00  05/23/18 22:00  05/23/18 22:00








Intake and Output: 


 











 05/23/18 05/24/18





 18:59 06:59


 


Output Total 1200 


 


Balance -1200 














- Medications


Medications: 


 Current Medications





Acetaminophen (Tylenol 325mg Tab)  650 mg PO Q4H PRN


   PRN Reason: Temperature


   Last Admin: 05/21/18 22:00 Dose:  650 mg


Clonidine HCl (Catapres-Tts3 0.3 Mg/24 Hr)  1 patch TD Q7D@1000 [facility]


   Last Admin: 05/20/18 17:19 Dose:  1 patch


Docusate Sodium (Colace)  100 mg PO DAILY [facility]


   Last Admin: 05/23/18 09:28 Dose:  100 mg


Hydromorphone HCl (Dilaudid)  1 mg IVP Q2H PRN


   PRN Reason: Pain, severe (8-10)


   Last Admin: 05/23/18 23:28 Dose:  1 mg


Piperacillin Sod/Tazobactam Sod (Zosyn 3.375 In Ns 100ml)  100 mls @ 200 mls/hr 

IVPB Q6 [facility]


   PRN Reason: Protocol


   Stop: 05/29/18 12:01


   Last Admin: 05/23/18 23:28 Dose:  200 mls/hr


Lactated Ringer's (Lactated Ringer's)  1,000 mls @ 20 mls/hr IV .Q24H [facility]


   Last Admin: 05/23/18 17:33 Dose:  20 mls/hr


Amino Acids/Electrolytes/Dextrose (Clinimix 4.25/5 % "E" (2000 Ml))  2,000 mls 

@ 83.333 mls/hr IV .Q24H [facility]


   Last Admin: 05/23/18 17:32 Dose:  83.333 mls/hr


Ondansetron HCl (Zofran Inj)  4 mg IVP Q6H PRN


   PRN Reason: Nausea/Vomiting


   Last Admin: 05/23/18 15:14 Dose:  4 mg


Pantoprazole Sodium (Protonix Inj)  40 mg IVP Q12 [facility]


   Last Admin: 05/23/18 21:34 Dose:  40 mg











- Labs


Labs: 


 





 05/21/18 06:20 





 05/21/18 06:20 





 











PT  13.5 SECONDS (9.4-12.5)  H  05/18/18  13:05    


 


INR  1.17  (0.93-1.08)  H  05/18/18  13:05    














Attending/Attestation





- Attestation


I have personally seen and examined this patient.: Yes


I have fully participated in the care of the patient.: Yes


I have reviewed all pertinent clinical information, including history, physical 

exam and plan: Yes


Notes (Text): 


This is an addendum to GI progress  report dictated by the Medical Resident.The 

patient was seen and examined earlier.  Medical records, lab studies, imagings 

were reviewed.  Last 24 hours events reviewed.  Agreed with the above treatment 

plan as outlined in Medical Resident 's notes the with the addition of the 

following 


05/23/18 23:43

## 2018-05-23 NOTE — PN
DATE:  05/22/2018



SUBJECTIVE:  Patient is a 28-year-old male.  Patient is seen and examined

on the bedside, looking comfortable.  Still having abdominal pain.  Lower

extremities are getting swollen.  I decreased his IV fluid from 150 mL/hour

to 100 mL/hour.  Has bowel movement with fecal blood as per patient.  No

nausea or vomiting.  Still having every night fever.



PHYSICAL EXAMINATION:

VITAL SIGNS:  Temperature 98, T-max 101, blood pressure 140/100,

respiratory rate 20, heart rate 65.

HEENT:  Head:  Normocephalic and atraumatic.  Eyes:  PERRLA.  Extraocular

muscles intact.  Conjunctivae clear.  Nose  patent.

NECK:  Supple.  No carotid bruit.  No JVD or thyromegaly.

CHEST:  Bilaterally symmetrical.

HEART:  S1 and S2 positive.

LUNGS:  Clear to auscultation.

ABDOMEN:  Soft.  Bowel sounds present.  No organomegaly.

EXTREMITIES:  Trace edema.



LABORATORY DATA:  Alcohol level less than 10.  White blood cells 6.3,

hemoglobin 11.2, hematocrit 33.5, platelets 109.  Sodium 135, potassium

3.4, BUN 5, creatinine 1.1, calcium 8, bilirubin 2.1.



ASSESSMENT AND PLAN:  Mr. Yo Woodall is a 28-year-old male with anemia;

thrombocytopenia; hypokalemia; hypochloremia; hypocalcemia; abnormal liver

function test, but trending down; hypertriglyceridemia; hematuria;

proteinuria; hepatitis.  Human immunodeficiency virus profile is negative. 

Seen by Dr. English because of fever every night.  Came with sepsis,

with acute pancreatitis and duodenitis with air in the wall of the duodenum

slowly improving, with history of influenza A infection, today day 4 on

Zosyn.  As per Dr. English, we will continue to do current antibiotic

therapy.  Still patient is n.p.o. except ice chips.  Because of the

swelling of the legs, I decreased his intravenous fluids from 150 to 100. 

We will start some parenteral nutrition after discussing with Dr. Mooney.

Patient went for pancreatic CT today.  According to that, diffused

pancreatitis with swelling and moderate amount of peripancreatic fluid

surrounding the body and tail.  There is no evidence of pseudocyst

formation.  Seen by Surgical team also and GI team.  History of gastritis. 

Magnetic resonance cholangiopancreatography done showed pancreatic

questionable pseudocyst.  No evidence choledocholithiasis.  Still n.p.o. 

Gastrointestinal and deep vein thrombosis prophylaxis.  Repeat labs.  We

will follow up.





__________________________________________

Vanda Hayes MD



DD:  05/22/2018 21:57:43

DT:  05/22/2018 23:18:41

Job # [id number]

## 2018-05-24 RX ADMIN — HYDROMORPHONE HYDROCHLORIDE PRN MG: 1 INJECTION, SOLUTION INTRAMUSCULAR; INTRAVENOUS; SUBCUTANEOUS at 15:47

## 2018-05-24 RX ADMIN — HYDROMORPHONE HYDROCHLORIDE PRN MG: 1 INJECTION, SOLUTION INTRAMUSCULAR; INTRAVENOUS; SUBCUTANEOUS at 17:52

## 2018-05-24 RX ADMIN — SUCRALFATE SCH GM: 1 SUSPENSION ORAL at 21:12

## 2018-05-24 RX ADMIN — HYDROMORPHONE HYDROCHLORIDE PRN MG: 1 INJECTION, SOLUTION INTRAMUSCULAR; INTRAVENOUS; SUBCUTANEOUS at 09:42

## 2018-05-24 RX ADMIN — PIPERACILLIN AND TAZOBACTAM SCH MLS/HR: 3; .375 INJECTION, POWDER, LYOPHILIZED, FOR SOLUTION INTRAVENOUS; PARENTERAL at 12:25

## 2018-05-24 RX ADMIN — HYDROMORPHONE HYDROCHLORIDE PRN MG: 1 INJECTION, SOLUTION INTRAMUSCULAR; INTRAVENOUS; SUBCUTANEOUS at 22:01

## 2018-05-24 RX ADMIN — HYDROMORPHONE HYDROCHLORIDE PRN MG: 1 INJECTION, SOLUTION INTRAMUSCULAR; INTRAVENOUS; SUBCUTANEOUS at 01:21

## 2018-05-24 RX ADMIN — HYDROMORPHONE HYDROCHLORIDE PRN MG: 1 INJECTION, SOLUTION INTRAMUSCULAR; INTRAVENOUS; SUBCUTANEOUS at 19:55

## 2018-05-24 RX ADMIN — HYDROMORPHONE HYDROCHLORIDE PRN MG: 1 INJECTION, SOLUTION INTRAMUSCULAR; INTRAVENOUS; SUBCUTANEOUS at 12:25

## 2018-05-24 RX ADMIN — PIPERACILLIN AND TAZOBACTAM SCH MLS/HR: 3; .375 INJECTION, POWDER, LYOPHILIZED, FOR SOLUTION INTRAVENOUS; PARENTERAL at 05:42

## 2018-05-24 RX ADMIN — SUCRALFATE SCH GM: 1 SUSPENSION ORAL at 13:44

## 2018-05-24 RX ADMIN — HYDROMORPHONE HYDROCHLORIDE PRN MG: 1 INJECTION, SOLUTION INTRAMUSCULAR; INTRAVENOUS; SUBCUTANEOUS at 03:48

## 2018-05-24 RX ADMIN — SUCRALFATE SCH GM: 1 SUSPENSION ORAL at 17:34

## 2018-05-24 RX ADMIN — HYDROMORPHONE HYDROCHLORIDE PRN MG: 1 INJECTION, SOLUTION INTRAMUSCULAR; INTRAVENOUS; SUBCUTANEOUS at 05:42

## 2018-05-24 RX ADMIN — PIPERACILLIN AND TAZOBACTAM SCH MLS/HR: 3; .375 INJECTION, POWDER, LYOPHILIZED, FOR SOLUTION INTRAVENOUS; PARENTERAL at 17:35

## 2018-05-24 NOTE — PN
DATE:  05/23/2018



SUBJECTIVE:  Patient is seen and examined at the bedside.  Feeling a little

bit better according to him.  Last night he had good sleep, 8 hours.  No

acute distress.  Sleeping has improved.  No nausea or vomiting.  No acute

event overnight.  No fever overnight.



PHYSICAL EXAMINATION:

VITAL SIGNS:  Temperature 98.4, pulse 61, respiratory rate 20, blood

pressure 144/98, pulse oximetry 96.

HEENT:  Head normocephalic, atraumatic.  Eyes, PERRLA.  Extraocular muscles

intact.  Conjunctivae clear.  Nose patent.

NECK:  Supple.  No carotid bruit.  No JVD or thyromegaly.

CHEST:  Bilaterally symmetrical.

HEART:  S1 and S2 positive.

LUNGS:  Clear to auscultation.

ABDOMEN:  Soft.  Bowel sounds positive.  No organomegaly.

EXTREMITIES:  No edema.  No cyanosis.

NEUROLOGIC:  The patient is awake and alert.  Moving all 4 extremities.  No

focal deficits.



MEDICATIONS:  Tylenol, Elavil, Catapres, Colace, Dilaudid, Zosyn, lactated

Ringer, Protonix.



LABORATORY DATA:  White blood cell 6.3, hemoglobin 11.3, hematocrit 33.5,

platelets 109.  Sodium 135, potassium 3.4, BUN 5, creatinine 1.1, glucose

82.



ASSESSMENT AND PLAN:  Mr. Yo Woodall is a 28-year-old male with anemia,

thrombocytopenia, hypokalemia, has acute pancreatitis and duodenitis,

history of gastritis.  CT of abdomen showed possible small loculated

air noted.  Not clear that etiology of the duodenal inflammation is

primarily secondary to the severe pancreatitis.  MRCP showed pancreatitis

or questionable pseudocyst, no evidence of cholelithiasis, peripancreatic

fluid, no signs of pseudocyst.  Patient finally will need endoscopy once

inflammation has improved.  Patient is still n.p.o. getting ice chips. 

Continue antibiotics and Zosyn.  ID is on the case, seen by Dr. Francis English.  History of sepsis, history of influenza A, day 5 of the Zosyn,

was treated empirically.  Dr. English's notes also reviewed.  Discussion

done with patient.  Patient is a nurse.  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

nutrition.  Gastrointestinal and deep vein prophylaxis.  Repeat labs.





__________________________________________

Vanda Hayes MD



DD:  05/23/2018 23:08:31

DT:  05/24/2018 0:17:14

[facility] # [id number]

BRANDON

## 2018-05-25 LAB
ALBUMIN SERPL-MCNC: 4 G/DL (ref 3–4.8)
ALBUMIN/GLOB SERPL: 1.2 {RATIO} (ref 1.1–1.8)
ALT SERPL-CCNC: 60 U/L (ref 7–56)
AMYLASE SERPL-CCNC: 124 U/L (ref 35–125)
AST SERPL-CCNC: 49 U/L (ref 17–59)
BUN SERPL-MCNC: 6 MG/DL (ref 7–21)
CALCIUM SERPL-MCNC: 9.6 MG/DL (ref 8.4–10.5)
ERYTHROCYTE [DISTWIDTH] IN BLOOD BY AUTOMATED COUNT: 12.2 % (ref 11.5–14.5)
GFR NON-AFRICAN AMERICAN: > 60
HGB BLD-MCNC: 13.2 G/DL (ref 14–18)
LIPASE SERPL-CCNC: 935 U/L (ref 23–300)
MCH RBC QN AUTO: 32.8 PG (ref 25–35)
MCHC RBC AUTO-ENTMCNC: 33.8 G/DL (ref 31–37)
MCV RBC AUTO: 96.8 FL (ref 80–105)
PLATELET # BLD: 264 10^3/UL (ref 120–450)
PMV BLD AUTO: 9.7 FL (ref 7–11)
RBC # BLD AUTO: 4.03 10^6/UL (ref 3.5–6.1)
WBC # BLD AUTO: 3.9 10^3/UL (ref 4.5–11)

## 2018-05-25 RX ADMIN — PIPERACILLIN AND TAZOBACTAM SCH MLS/HR: 3; .375 INJECTION, POWDER, LYOPHILIZED, FOR SOLUTION INTRAVENOUS; PARENTERAL at 23:22

## 2018-05-25 RX ADMIN — SUCRALFATE SCH GM: 1 SUSPENSION ORAL at 21:14

## 2018-05-25 RX ADMIN — PIPERACILLIN AND TAZOBACTAM SCH MLS/HR: 3; .375 INJECTION, POWDER, LYOPHILIZED, FOR SOLUTION INTRAVENOUS; PARENTERAL at 11:54

## 2018-05-25 RX ADMIN — HYDROMORPHONE HYDROCHLORIDE PRN MG: 1 INJECTION, SOLUTION INTRAMUSCULAR; INTRAVENOUS; SUBCUTANEOUS at 01:59

## 2018-05-25 RX ADMIN — PIPERACILLIN AND TAZOBACTAM SCH MLS/HR: 3; .375 INJECTION, POWDER, LYOPHILIZED, FOR SOLUTION INTRAVENOUS; PARENTERAL at 00:04

## 2018-05-25 RX ADMIN — SUCRALFATE SCH GM: 1 SUSPENSION ORAL at 13:52

## 2018-05-25 RX ADMIN — HYDROMORPHONE HYDROCHLORIDE PRN MG: 1 INJECTION, SOLUTION INTRAMUSCULAR; INTRAVENOUS; SUBCUTANEOUS at 00:04

## 2018-05-25 RX ADMIN — PIPERACILLIN AND TAZOBACTAM SCH MLS/HR: 3; .375 INJECTION, POWDER, LYOPHILIZED, FOR SOLUTION INTRAVENOUS; PARENTERAL at 06:11

## 2018-05-25 RX ADMIN — HYDROMORPHONE HYDROCHLORIDE PRN MG: 1 INJECTION, SOLUTION INTRAMUSCULAR; INTRAVENOUS; SUBCUTANEOUS at 20:10

## 2018-05-25 RX ADMIN — HYDROMORPHONE HYDROCHLORIDE PRN MG: 1 INJECTION, SOLUTION INTRAMUSCULAR; INTRAVENOUS; SUBCUTANEOUS at 11:30

## 2018-05-25 RX ADMIN — HYDROMORPHONE HYDROCHLORIDE PRN MG: 1 INJECTION, SOLUTION INTRAMUSCULAR; INTRAVENOUS; SUBCUTANEOUS at 13:53

## 2018-05-25 RX ADMIN — PIPERACILLIN AND TAZOBACTAM SCH MLS/HR: 3; .375 INJECTION, POWDER, LYOPHILIZED, FOR SOLUTION INTRAVENOUS; PARENTERAL at 17:40

## 2018-05-25 RX ADMIN — HYDROMORPHONE HYDROCHLORIDE PRN MG: 1 INJECTION, SOLUTION INTRAMUSCULAR; INTRAVENOUS; SUBCUTANEOUS at 09:21

## 2018-05-25 RX ADMIN — SUCRALFATE SCH GM: 1 SUSPENSION ORAL at 09:18

## 2018-05-25 RX ADMIN — HYDROMORPHONE HYDROCHLORIDE PRN MG: 1 INJECTION, SOLUTION INTRAMUSCULAR; INTRAVENOUS; SUBCUTANEOUS at 06:11

## 2018-05-25 RX ADMIN — SUCRALFATE SCH GM: 1 SUSPENSION ORAL at 17:03

## 2018-05-25 RX ADMIN — HYDROMORPHONE HYDROCHLORIDE PRN MG: 1 INJECTION, SOLUTION INTRAMUSCULAR; INTRAVENOUS; SUBCUTANEOUS at 23:31

## 2018-05-25 RX ADMIN — HYDROMORPHONE HYDROCHLORIDE PRN MG: 1 INJECTION, SOLUTION INTRAMUSCULAR; INTRAVENOUS; SUBCUTANEOUS at 16:43

## 2018-05-25 NOTE — PN
DATE:  05/24/2018



SUBJECTIVE:  The patient is in bed, in no acute distress, was seen earlier

this morning in 566.



PHYSICAL EXAMINATION:

VITAL SIGNS:  Temperature is 98, blood pressure is 120/70, respiratory rate

of 16.

HEENT:  Unremarkable.

NECK:  Supple.

LUNGS:  Have decreased breath sounds.

HEART:  Normal S1 and S2.

ABDOMEN:  Soft, mild tenderness.  No rebound or guarding.



LABORATORY DATA:  Reveals a white count of 6.3, hemoglobin of 11, platelets

of 109.



Review of orders reveals the patient had been on Zosyn which requires

renewal, which we will do so.



ASSESSMENT AND PLAN:  A 28-year-old male, who was seen earlier today with

acute pancreatitis and duodenitis with air in the wall of the duodenum,

slowly improving, with history of influenza, day #6 of Zosyn empirically

because of the air in the duodenal wall, and blood cultures negative thus

far, and Dr. Hayse's note is reviewed.  We will continue the antibiotic

course and follow the patient clinically.





__________________________________________

Francis English MD







DD:  05/24/2018 22:10:15

DT:  05/24/2018 22:48:47

Job # [id number]

## 2018-05-25 NOTE — CP.PCM.PN
Subjective





- Date & Time of Evaluation


Date of Evaluation: 05/25/18


Time of Evaluation: 11:15





- Subjective


Subjective: 





Patient is still having abdominal pain, but no nausea, no fevers, still no 

bowel movement, currently on PPN. 





Objective





- Vital Signs/Intake and Output


Vital Signs (last 24 hours): 


 











Temp Pulse Resp BP Pulse Ox


 


 98.1 F   57 L  18   122/75   98 


 


 05/25/18 06:00  05/25/18 06:00  05/25/18 06:00  05/25/18 06:00  05/25/18 06:00








Intake and Output: 


 











 05/25/18 05/25/18





 06:59 18:59


 


Intake Total 0 


 


Output Total 800 


 


Balance -800 














- Medications


Medications: 


 Current Medications





Acetaminophen (Tylenol 325mg Tab)  650 mg PO Q4H PRN


   PRN Reason: Temperature


   Last Admin: 05/21/18 22:00 Dose:  650 mg


Clonidine HCl (Catapres-Tts3 0.3 Mg/24 Hr)  1 patch TD Q7D@1000 [facility]


   Last Admin: 05/20/18 17:19 Dose:  1 patch


Docusate Sodium (Colace)  100 mg PO DAILY [facility]


   Last Admin: 05/25/18 09:20 Dose:  Not Given


Hydromorphone HCl (Dilaudid)  1 mg IVP Q2H PRN


   PRN Reason: Pain, severe (8-10)


   Last Admin: 05/25/18 09:21 Dose:  1 mg


Piperacillin Sod/Tazobactam Sod (Zosyn 3.375 In Ns 100ml)  100 mls @ 200 mls/hr 

IVPB Q6 [facility]


   PRN Reason: Protocol


   Stop: 05/29/18 12:01


   Last Admin: 05/25/18 06:11 Dose:  200 mls/hr


Lactated Ringer's (Lactated Ringer's)  1,000 mls @ 20 mls/hr IV .Q24H [facility]


   Last Admin: 05/23/18 17:33 Dose:  20 mls/hr


Amino Acids/Electrolytes/Dextrose (Clinimix 4.25/5 % "E" (2000 Ml))  2,000 mls 

@ 83.333 mls/hr IV .Q24H [facility]


   Last Admin: 05/24/18 17:36 Dose:  83.333 mls/hr


Ondansetron HCl (Zofran Inj)  4 mg IVP Q6H PRN


   PRN Reason: Nausea/Vomiting


   Last Admin: 05/23/18 15:14 Dose:  4 mg


Pantoprazole Sodium (Protonix Inj)  40 mg IVP DAILY [facility]


   Last Admin: 05/25/18 09:20 Dose:  40 mg


Sucralfate (Carafate Oral Susp)  1 gm PO QID [facility]


   Last Admin: 05/25/18 09:18 Dose:  1 gm











- Labs


Labs: 


 





 05/25/18 07:00 





 05/25/18 07:00 





 











PT  13.5 SECONDS (9.4-12.5)  H  05/18/18  13:05    


 


INR  1.17  (0.93-1.08)  H  05/18/18  13:05    














- Constitutional


Appears: Non-toxic





- Head Exam


Head Exam: NORMAL INSPECTION





- Neck Exam


Neck Exam: absent: Meningismus





- Respiratory Exam


Respiratory Exam: absent: Rales





- Cardiovascular Exam


Cardiovascular Exam: +S1, +S2





- GI/Abdominal Exam


GI & Abdominal Exam: Soft, Tenderness (epigastric area).  absent: Distended, 

Guarding, Rigid, Rebound





Assessment and Plan





- Assessment and Plan (Free Text)


Plan: 





Assessment


Sepsis due to acute pancreatitis and duodenitis (with some air in the wall of 

the duodenum), slowly improving clinically but patient may be developing 

pancreatic pseudocyst


history of Influenza A infection





Plan


Continue Zosyn day 7 and will follow up further recommendations of GI - 

reviewed repeat CT scan which is showing possible formation of pseudocyst - 

patient is still NPO and on PPN


HIV test is non-reactive


will continue to monitor clinically

## 2018-05-25 NOTE — PN
DATE:  05/25/2018



SUBJECTIVE:  Patient was seen and examined on the bedside.  Girlfriend was

sitting on the bedside also.  According to the patient, the pain is getting

a little bit better.  Dr. Mooney spent long time conversation with the

patient.  Plan is to decrease pain medication and start some liquid diet. 

No nausea, vomiting, diarrhea.  Still no bowel movement.  Currently on PPN.

No fever.  No chills.



PHYSICAL EXAMINATION:

VITAL SIGNS:  Temperature 98.1, pulse 80 , respiratory rate 18, blood

pressure 120 /75, pulse oximetry 98%.

HEENT:  Head is normocephalic and atraumatic.  Eyes, PERRLA.  Extraocular

muscles intact.  Conjunctivae clear.  Nose  patent.  Mucous membrane moist.

NECK:  Supple.  No carotid bruits.  No JVD or thyromegaly.

CHEST:  Bilaterally symmetrical.

HEART:  S1 and S2 positive.

LUNGS:  Clear to auscultation.

ABDOMEN:  Soft.  Tender on deep palpation.  No organomegaly.

EXTREMITIES:  No edema.  No cyanosis.

NEUROLOGIC:  Patient is awake and alert.  Moving all four extremities.  No

focal deficit.



MEDICATIONS:  Clonidine, Colace, Dilaudid, Zosyn, Ringer lactate, Carafate,

Protonix.



LABORATORY DATA:  White blood cells 8.9, hemoglobin 13.2, hematocrit 39,

platelets 264.



ASSESSMENT AND PLAN:  Mr. Yo Woodall is a 28-year-old male with

leukopenia, anemia, sepsis due to acute pancreatitis and duodenitis with

some air in the wall of the duodenum, slowly improving clinically.  Patient

has a history of influenza A.  Continue Zosyn, day 7, and we will follow up

further recommendation as of GI.  Repeat CT scan, which is showing possibly

formation of pseudocyst.  Patient is still n.p.o. and on peripheral

parenteral nutrition.  Human immunodeficiency virus test is nonreactive. 

We will continue monitoring clinically as per Infectious Disease.  Reviewed

Dr. Ganesh Waddell's notes.  Discussion done with Dr. Mooney length of

time.  He explained the whole situation to the patient, and now plan is

patient will try to control pain medication.  If he will hold his pain

without pain medication or increase the gap, then tomorrow we will start

liquid diet; meanwhile, continue present treatment.  Repeat labs.  We will

follow up.





__________________________________________

Vanda Hayes MD



DD:  05/25/2018 18:39:41

DT:  05/25/2018 20:16:22

[facility] # [id number]

[email or web address]

## 2018-05-25 NOTE — CP.PCM.PN
Subjective





- Date & Time of Evaluation


Date of Evaluation: 05/24/18


Time of Evaluation: 23:59





- Subjective


Subjective: 





# 22 angiocath was inserted in right hand dorsum.








Objective





- Vital Signs/Intake and Output


Vital Signs (last 24 hours): 


 











Temp Pulse Resp BP Pulse Ox


 


 98.3 F   69   20   153/99 H  98 


 


 05/24/18 23:12  05/24/18 23:12  05/24/18 23:12  05/24/18 23:12  05/24/18 23:12








Intake and Output: 


 











 05/24/18 05/25/18





 18:59 06:59


 


Intake Total  0


 


Output Total 300 


 


Balance -300 0














- Medications


Medications: 


 Current Medications





Acetaminophen (Tylenol 325mg Tab)  650 mg PO Q4H PRN


   PRN Reason: Temperature


   Last Admin: 05/21/18 22:00 Dose:  650 mg


Clonidine HCl (Catapres-Tts3 0.3 Mg/24 Hr)  1 patch TD Q7D@1000 [facility]


   Last Admin: 05/20/18 17:19 Dose:  1 patch


Docusate Sodium (Colace)  100 mg PO DAILY [facility]


   Last Admin: 05/24/18 09:41 Dose:  100 mg


Hydromorphone HCl (Dilaudid)  1 mg IVP Q2H PRN


   PRN Reason: Pain, severe (8-10)


   Last Admin: 05/24/18 22:01 Dose:  1 mg


Piperacillin Sod/Tazobactam Sod (Zosyn 3.375 In Ns 100ml)  100 mls @ 200 mls/hr 

IVPB Q6 [facility]


   PRN Reason: Protocol


   Stop: 05/29/18 12:01


   Last Admin: 05/24/18 17:35 Dose:  200 mls/hr


Lactated Ringer's (Lactated Ringer's)  1,000 mls @ 20 mls/hr IV .Q24H [facility]


   Last Admin: 05/23/18 17:33 Dose:  20 mls/hr


Amino Acids/Electrolytes/Dextrose (Clinimix 4.25/5 % "E" (2000 Ml))  2,000 mls 

@ 83.333 mls/hr IV .Q24H [facility]


   Last Admin: 05/24/18 17:36 Dose:  83.333 mls/hr


Ondansetron HCl (Zofran Inj)  4 mg IVP Q6H PRN


   PRN Reason: Nausea/Vomiting


   Last Admin: 05/23/18 15:14 Dose:  4 mg


Pantoprazole Sodium (Protonix Inj)  40 mg IVP DAILY [facility]


Sucralfate (Carafate Oral Susp)  1 gm PO QID [facility]


   Last Admin: 05/24/18 21:12 Dose:  1 gm











- Labs


Labs: 


 





 05/21/18 06:20 





 05/21/18 06:20 





 











PT  13.5 SECONDS (9.4-12.5)  H  05/18/18  13:05    


 


INR  1.17  (0.93-1.08)  H  05/18/18  13:05

## 2018-05-25 NOTE — PN
DATE:  05/24/2018



SUBJECTIVE:  The patient is 28-year-old male.  The patient is seen and

examined on the bedside.  Looking comfortable.  No nausea, vomiting,

diarrhea.  No hematuria or hematochezia.  No swelling of the leg.  No chest

pain.  Abdominal pain is getting better.  Getting parenteral nutrition.



PHYSICAL EXAMINATION:

VITAL SIGNS:  Temperature 98.1, pulse 68, blood pressure 139/99,

respiratory rate 18.

HEENT:  Head normocephalic, atraumatic.  Eyes PERRLA.  Extraocular muscles

intact.  Conjunctivae clear.  Nose patent.  Mucous membrane moist.

NECK:  Supple.  No carotid bruit.  No JVD or thyromegaly.

CHEST:  Bilaterally symmetrical.

HEART:  S1 and S2 positive.

LUNGS:  Clear to auscultation.

ABDOMEN:  Soft.  Bowel sounds positive.  No organomegaly.

EXTREMITIES:  No edema.  No cyanosis.

NEUROLOGICAL:  The patient is awake and alert.  Moving all 4 extremities. 

No focal deficits.



LABORATORY DATA:  White blood cells 6.3, hemoglobin 11.3, hematocrit 33.5,

platelets 109.  Sodium 135, potassium 3.4, BUN 5, creatinine 1.1, glucose

82.



MEDICATIONS:  Carafate, Catapres, Clinimix, Colace, Dilaudid, Ringer's

lactated, Protonix, Tylenol, Zofran, Zosyn.



ASSESSMENT AND PLAN:  Mr. Yo Woodall is a 28-year-old male with anemia;

hypokalemia, replaced; hypochloremia; hypocalcemia; abnormal liver function

test.  Admitted for pancreatitis.  Gastroenterology and Infectious Disease

is on the case.  Used to have fever at night.  History of sepsis. 

Duodenitis with air in the wall of the duodenum, slowly improving with

history of influenza A.  Today is day 5 of the Zosyn.  We will treat it

empirically.  Dr. English, Dr. Mooney's notes appreciated.  Continue

present treatment.  Gastrointestinal and deep venous thrombosis

prophylaxis.  Repeat labs.  Out of bed.  Physical therapy.  We will follow

up.





__________________________________________

Vanda Hayes MD





DD:  05/24/2018 21:53:56

DT:  05/24/2018 22:27:14

Job # [id number]

## 2018-05-25 NOTE — PN
DATE:  05/25/2018



SUBJECTIVE:  This patient was seen and evaluated today.  I had a long

discussion with the patient.  Feeling much better.  He remains afebrile. 

As per the patient, pain has significantly improved, but however, he is

still requiring pain medications every 2 to 2-1/2 hours.  He says he is

able to move better.



PHYSICAL EXAMINATION:

VITAL SIGNS:  Temperature is 97.6, pulse is 64, blood pressure 127/92.

HEENT:  Atraumatic, anicteric.

NECK:  Supple.

HEART:  S1 and S2 heard.

LUNGS:  Bilateral air entry present.

ABDOMEN:  Soft.  There is tenderness present at the epigastric area.  There

is no rebound or guarding.

EXTREMITIES:  No edema, no cyanosis.

NEUROLOGICAL:  Alert, oriented, moves all the extremities.



LABORATORY DATA:  Hemoglobin 13.2, hematocrit 39, WBC 3.9, platelet 264,

which is improved.  Chemistries essentially unremarkable.  The total

bilirubin has now come down to normal, 0.4.  AST 49, ALT 60.  Amylase level

is only 154.  B12 is 1935.



IMPRESSION:  This 28-year-old patient admitted with an abdominal pain,

found to be pancreatitis.  The etiology is unclear.  The patient has mildly

elevated triglycerides level; highest was 868 and has come down to 224. 

The patient did have ultrasound scan done which showed no gallstones.  The

patient also had an MRI done which did not show any obvious divisum. 

Denies any alcohol use.  On examination, the patient did have an endoscopy

done in [facility], which showed a gastritis.



On this admission, the patient was found to have a significant

pancreatitis.  Patient had a repeat CAT scan, shows no abscess.  No

necrotic pancreatic area noticed in the last CAT scan.  The MRCP did not

reveal any obvious divisum.



The patient's duodenum showed significant inflammatory changes probably

secondary to the pancreatitis.



The patient did have a mild thrombocytopenia and Protonix dose has been

lowered to once a day and the patient is on Carafate 4 times a day.



The patient has been nothing by mouth, on peripheral parenteral nutrition.



PLAN:  Since clinically he is feeling better, the patient was advised to

increase the pain medication frequency to at least 3 to 4 hours.  The one

factor which does not match the clinical improvement is the pain

requirement.  I did explain to him that it is pretty difficult to 

clinically if the patient is not reducing the pain medication requirement,

even though clinically feeling better.  The plan is to start him on a clear

liquid diet once the pain medication requirement at least decreases.



Follow up of the electrolytes.



The patient may need further workup electively to evaluate further the

etiology for pancreatitis.



We will renew the PPN and followup of the electrolytes will continue.



Thank you very much for allowing us to participate in the care of the

patient.





__________________________________________

Gallo Mooney MD



DD:  05/25/2018 17:52:01

DT:  05/25/2018 20:12:18

Job # [id number]

## 2018-05-26 RX ADMIN — SUCRALFATE SCH GM: 1 SUSPENSION ORAL at 17:35

## 2018-05-26 RX ADMIN — SUCRALFATE SCH GM: 1 SUSPENSION ORAL at 14:20

## 2018-05-26 RX ADMIN — SUCRALFATE SCH GM: 1 SUSPENSION ORAL at 21:32

## 2018-05-26 RX ADMIN — HYDROMORPHONE HYDROCHLORIDE PRN MG: 1 INJECTION, SOLUTION INTRAMUSCULAR; INTRAVENOUS; SUBCUTANEOUS at 13:52

## 2018-05-26 RX ADMIN — PIPERACILLIN AND TAZOBACTAM SCH MLS/HR: 3; .375 INJECTION, POWDER, LYOPHILIZED, FOR SOLUTION INTRAVENOUS; PARENTERAL at 12:54

## 2018-05-26 RX ADMIN — PIPERACILLIN AND TAZOBACTAM SCH MLS/HR: 3; .375 INJECTION, POWDER, LYOPHILIZED, FOR SOLUTION INTRAVENOUS; PARENTERAL at 05:23

## 2018-05-26 RX ADMIN — HYDROMORPHONE HYDROCHLORIDE PRN MG: 1 INJECTION, SOLUTION INTRAMUSCULAR; INTRAVENOUS; SUBCUTANEOUS at 17:36

## 2018-05-26 RX ADMIN — SUCRALFATE SCH GM: 1 SUSPENSION ORAL at 10:39

## 2018-05-26 RX ADMIN — HYDROMORPHONE HYDROCHLORIDE PRN MG: 1 INJECTION, SOLUTION INTRAMUSCULAR; INTRAVENOUS; SUBCUTANEOUS at 05:22

## 2018-05-26 RX ADMIN — HYDROMORPHONE HYDROCHLORIDE PRN MG: 1 INJECTION, SOLUTION INTRAMUSCULAR; INTRAVENOUS; SUBCUTANEOUS at 10:40

## 2018-05-26 RX ADMIN — PIPERACILLIN AND TAZOBACTAM SCH MLS/HR: 3; .375 INJECTION, POWDER, LYOPHILIZED, FOR SOLUTION INTRAVENOUS; PARENTERAL at 23:04

## 2018-05-26 RX ADMIN — PIPERACILLIN AND TAZOBACTAM SCH MLS/HR: 3; .375 INJECTION, POWDER, LYOPHILIZED, FOR SOLUTION INTRAVENOUS; PARENTERAL at 17:37

## 2018-05-26 RX ADMIN — HYDROMORPHONE HYDROCHLORIDE PRN MG: 1 INJECTION, SOLUTION INTRAMUSCULAR; INTRAVENOUS; SUBCUTANEOUS at 22:02

## 2018-05-26 NOTE — PN
DATE:  05/26/2018



SUBJECTIVE:  This patient was seen and evaluated earlier today.  The

patient is doing much better.  He has cut down the pain requirement between

3 to over 3 hours.  Overall, he is also feeling better on it.



PHYSICAL EXAMINATION:

VITAL SIGNS:  He remains afebrile, blood pressure is 136/100, pulse is 85.

HEENT:  Atraumatic, anicteric.

NECK:  Supple.

HEART:  S1 and S2 heard.

LUNGS:  Bilateral air entry present.

ABDOMEN:  Soft.  There is tenderness present in the epigastric area.

EXTREMITIES:  No edema.  No cyanosis.



LABORATORY DATA:  There are no recent labs requested today.



IMPRESSION:

1.  Acute pancreatitis, etiology is unclear.

2.  Severe duodenitis with probably secondary to the surrounding

pancreatitis inflammatory changes.

3.  Partial gastric outlet obstruction secondary to the significant edema

of the antrum and duodenal areas secondary to the pancreatitis.  The

patient did have a small air bubble in the wall.  He has significant

improvement now.

4.  History of mild thrombocytopenia, improved.



RECOMMENATIONS:

1.  Continue the PPI.

2.  Low-dose PPI and Carafate.

3.  We will start the patient on a clear liquid diet.

4.  Followup of the labs in the a.m.



Thank you very much for allowing us to participate in the care of the

patient.





__________________________________________

Gallo Mooney MD





DD:  05/26/2018 21:01:25

DT:  05/26/2018 22:52:58

Job # [id number]

## 2018-05-26 NOTE — PN
DATE:  05/26/2018



SUBJECTIVE:  The patient is in bed, in no acute distress, nontoxic.



PHYSICAL EXAMINATION:

VITAL SIGNS:  Temperature is 98, blood pressure is 120/90, respiratory rate

of 18.

HEENT:  Unremarkable.

NECK:  Supple.

LUNGS:  Have decreased breath sounds.

HEART:  Normal S1, S2.

ABDOMEN:  Soft, nontender.



LABORATORY DATA:  Reveals a white count of 3.9, hemiglobin of 13, platelets

of 264.  Chemistries reveal a BUN of 6, creatinine of 0.9.  Urinalysis is

noted.  Urology is noted.  Microbiology is reviewed.  Review of orders

reveals the patient to be on Zosyn and Dr. Hayes's progress note is

reviewed.  The patient states that his pain is somewhat less.



ASSESSMENT AND PLAN:  This is a 28-year-old with sepsis due to acute

pancreatitis and duodenitis with some air in the wall of the duodenum,

slowly improving, developing pancreatic pseudocyst, possibly on day #8 of

Zosyn.  We will continue with the antibiotic therapy, concern about the air

in the wall of the duodenum.  We will follow closely with you.







__________________________________________

Francis English MD



DD:  05/26/2018 17:11:02

DT:  05/26/2018 19:14:12

Job # [id number]

## 2018-05-27 RX ADMIN — SUCRALFATE SCH GM: 1 SUSPENSION ORAL at 09:56

## 2018-05-27 RX ADMIN — HYDROMORPHONE HYDROCHLORIDE PRN MG: 1 INJECTION, SOLUTION INTRAMUSCULAR; INTRAVENOUS; SUBCUTANEOUS at 20:39

## 2018-05-27 RX ADMIN — SUCRALFATE SCH GM: 1 SUSPENSION ORAL at 21:10

## 2018-05-27 RX ADMIN — HYDROMORPHONE HYDROCHLORIDE PRN MG: 1 INJECTION, SOLUTION INTRAMUSCULAR; INTRAVENOUS; SUBCUTANEOUS at 02:13

## 2018-05-27 RX ADMIN — SUCRALFATE SCH GM: 1 SUSPENSION ORAL at 17:38

## 2018-05-27 RX ADMIN — HYDROMORPHONE HYDROCHLORIDE PRN MG: 1 INJECTION, SOLUTION INTRAMUSCULAR; INTRAVENOUS; SUBCUTANEOUS at 09:56

## 2018-05-27 RX ADMIN — PIPERACILLIN AND TAZOBACTAM SCH MLS/HR: 3; .375 INJECTION, POWDER, LYOPHILIZED, FOR SOLUTION INTRAVENOUS; PARENTERAL at 17:40

## 2018-05-27 RX ADMIN — SUCRALFATE SCH GM: 1 SUSPENSION ORAL at 14:13

## 2018-05-27 RX ADMIN — PIPERACILLIN AND TAZOBACTAM SCH MLS/HR: 3; .375 INJECTION, POWDER, LYOPHILIZED, FOR SOLUTION INTRAVENOUS; PARENTERAL at 05:00

## 2018-05-27 RX ADMIN — HYDROMORPHONE HYDROCHLORIDE PRN MG: 1 INJECTION, SOLUTION INTRAMUSCULAR; INTRAVENOUS; SUBCUTANEOUS at 15:06

## 2018-05-27 RX ADMIN — PIPERACILLIN AND TAZOBACTAM SCH MLS/HR: 3; .375 INJECTION, POWDER, LYOPHILIZED, FOR SOLUTION INTRAVENOUS; PARENTERAL at 13:00

## 2018-05-27 RX ADMIN — HYDROMORPHONE HYDROCHLORIDE PRN MG: 1 INJECTION, SOLUTION INTRAMUSCULAR; INTRAVENOUS; SUBCUTANEOUS at 05:43

## 2018-05-27 NOTE — PN
DATE:  05/27/2018



SUBJECTIVE:  The patient is in bed, seen early this morning, comfortable.



PHYSICAL EXAMINATION:

VITAL SIGNS:  Temperature is 98, blood pressure is 114/70, respiratory rate

of 16.

HEENT:  Examination is unremarkable.

NECK:  Supple.

HEART:  Normal S1, S2.

LUNGS:  Decreased breath sounds.

ABDOMEN:  Soft.



DATA:  Laboratory examination reveals a white count of 3.9, hemoglobin of

13, platelets of 264.  Chemistry reveals a BUN of 6, creatinine of 0.9 and

urinalysis is noted.  Blood cultures are negative and Dr. Mooney's note

is reviewed.  Dr. Hayes's note is reviewed.



ASSESSMENT AND PLAN:  This is a 28-year-old who is admitted with sepsis,

acute pancreatitis and duodenitis.  Dr. Mooney does not believe that

hypertriglyceridemia is a cause of the pancreatitis in this patient. 

Initially, the triglycerides are 868, which may cause pancreatitis. 

Etiology of the pancreatitis, he states, is not sure.  I would recommend

treating the hypertriglyceridemia in this patient, currently on Zosyn

although pancreatitis in itself does not need antibiotics because of the

air in the duodenal wall.  We would continue the Zosyn.  May be able to

change the p.o. Augmentin upon discharge at 875 mg p.o. b.i.d.  Case

discussed with Dr. Mooney.







__________________________________________

Francis English MD



DD:  05/27/2018 10:16:47

DT:  05/27/2018 10:38:47

Job # [id number]

## 2018-05-27 NOTE — PN
DATE:  05/27/2018



SUBJECTIVE:  This patient was seen and evaluated earlier.  The patient is

feeling much better, he is requiring pain medication only every 4 hours

now.  He is tolerating a clear liquid diet.



PHYSICAL EXAMINATION:

VITAL SIGNS:  Temperature is 98.2, pulse 66, blood pressure is 114/71.

HEENT:  Atraumatic, anicteric.

NECK:  Supple.

HEART:  S1 and S2 heard.

LUNGS:  Bilateral air entry present.

ABDOMEN:  Soft.  There was tenderness present in the epigastric area.

EXTREMITIES:  No edema.  No cyanosis.

NEUROLOGICAL:  Alert, oriented, moves all the extremities.



LABORATORY DATA:  No recent labs.



IMPRESSION AND PLAN:

1.  This 28-year-old patient admitted with acute pancreatitis, etiology

unclear.

2.  Severe duodenitis, probably secondary to the surrounding pancreatitis

with inflammatory changes.

3.  Partial gastric outlet type of obstruction with significant edema of

the antrum and the duodenal area secondary to the pancreatitis.

4.  The patient appears to be clinically improving.  The patient did have

transient thrombocytopenia, probably drug induced and the _____ dose was

reduced.  Would recommend at this point slowly advance the diet to full

liquid diet.  The patient may need to be slowly advance the diet to pureed

diet.  Since in view of the significant duodenal edema, we will advise the

patient to continue only the pureed diet.  I will continue to closely

follow up his care.



Thank you very much for allowing us to participate in the care of the

patient.







__________________________________________

Gallo Mooney MD



DD:  05/27/2018 12:48:14

DT:  05/27/2018 16:43:36

Job # [id number]

## 2018-05-28 LAB
AMYLASE SERPL-CCNC: 154 U/L (ref 35–125)
BUN SERPL-MCNC: 14 MG/DL (ref 7–21)
CALCIUM SERPL-MCNC: 10 MG/DL (ref 8.4–10.5)
ERYTHROCYTE [DISTWIDTH] IN BLOOD BY AUTOMATED COUNT: 12.1 % (ref 11.5–14.5)
GFR NON-AFRICAN AMERICAN: > 60
HGB BLD-MCNC: 14.5 G/DL (ref 14–18)
LIPASE SERPL-CCNC: 1340 U/L (ref 23–300)
MCH RBC QN AUTO: 32.9 PG (ref 25–35)
MCHC RBC AUTO-ENTMCNC: 33.5 G/DL (ref 31–37)
MCV RBC AUTO: 98.2 FL (ref 80–105)
PLATELET # BLD: 444 10^3/UL (ref 120–450)
PMV BLD AUTO: 9.7 FL (ref 7–11)
RBC # BLD AUTO: 4.41 10^6/UL (ref 3.5–6.1)
WBC # BLD AUTO: 4.7 10^3/UL (ref 4.5–11)

## 2018-05-28 RX ADMIN — PIPERACILLIN AND TAZOBACTAM SCH MLS/HR: 3; .375 INJECTION, POWDER, LYOPHILIZED, FOR SOLUTION INTRAVENOUS; PARENTERAL at 11:45

## 2018-05-28 RX ADMIN — SUCRALFATE SCH GM: 1 SUSPENSION ORAL at 13:32

## 2018-05-28 RX ADMIN — SUCRALFATE SCH GM: 1 SUSPENSION ORAL at 17:09

## 2018-05-28 RX ADMIN — HYDROMORPHONE HYDROCHLORIDE PRN MG: 2 INJECTION, SOLUTION INTRAMUSCULAR; INTRAVENOUS; SUBCUTANEOUS at 20:59

## 2018-05-28 RX ADMIN — SUCRALFATE SCH GM: 1 SUSPENSION ORAL at 21:00

## 2018-05-28 RX ADMIN — HYDROMORPHONE HYDROCHLORIDE PRN MG: 1 INJECTION, SOLUTION INTRAMUSCULAR; INTRAVENOUS; SUBCUTANEOUS at 15:46

## 2018-05-28 RX ADMIN — SUCRALFATE SCH GM: 1 SUSPENSION ORAL at 10:26

## 2018-05-28 RX ADMIN — PIPERACILLIN AND TAZOBACTAM SCH MLS/HR: 3; .375 INJECTION, POWDER, LYOPHILIZED, FOR SOLUTION INTRAVENOUS; PARENTERAL at 17:11

## 2018-05-28 RX ADMIN — PIPERACILLIN AND TAZOBACTAM SCH MLS/HR: 3; .375 INJECTION, POWDER, LYOPHILIZED, FOR SOLUTION INTRAVENOUS; PARENTERAL at 05:23

## 2018-05-28 RX ADMIN — PIPERACILLIN AND TAZOBACTAM SCH MLS/HR: 3; .375 INJECTION, POWDER, LYOPHILIZED, FOR SOLUTION INTRAVENOUS; PARENTERAL at 00:11

## 2018-05-28 RX ADMIN — HYDROMORPHONE HYDROCHLORIDE PRN MG: 1 INJECTION, SOLUTION INTRAMUSCULAR; INTRAVENOUS; SUBCUTANEOUS at 05:22

## 2018-05-28 RX ADMIN — PIPERACILLIN AND TAZOBACTAM SCH MLS/HR: 3; .375 INJECTION, POWDER, LYOPHILIZED, FOR SOLUTION INTRAVENOUS; PARENTERAL at 23:38

## 2018-05-28 RX ADMIN — HYDROMORPHONE HYDROCHLORIDE PRN MG: 1 INJECTION, SOLUTION INTRAMUSCULAR; INTRAVENOUS; SUBCUTANEOUS at 10:34

## 2018-05-28 NOTE — PN
DATE:  05/27/2018



SUBJECTIVE:  The patient is a 28-year-old male.  The patient is seen and

examined on the bedside.  Girlfriend was sitting on the bedside also.  As

per patient, he is hungry.  He wants to eat more and as per Dr. Mooney,

he advanced diet from clear liquid to full liquid and we are trying to

spacing out the patient's pain management, no we make like 5 hour instead

of 4.  Tolerating clear liquid diet.  No nausea or vomiting.  No fever, no

chills.



PHYSICAL EXAMINATION:

VITAL SIGNS:  Temperature 98.2, pulse 66, blood pressure 114/71,

respiratory rate 18.

HEENT:  Head normocephalic, atraumatic.  Eyes PERRLA.  Extraocular muscles

intact.  Conjunctivae clear.  Nose patent.  Mucous membrane moist.

NECK:  Supple.  No carotid bruit, JVD or thyromegaly.

CHEST:  Bilaterally symmetrical.

HEART:  S1 and S2 positive.

LUNGS:  Bilateral air entry present.

ABDOMEN:  Soft.  Tenderness is getting better, but still there in the

epigastric area.  Bowel sounds positive.  No organomegaly.

EXTREMITIES:  No edema.  No cyanosis.

NEUROLOGICAL:  The patient is awake and alert.  Moving all 4 extremities. 

No focal deficits.



MEDICATIONS:  Carafate, Catapres, Colace, Dilaudid, Ringer's

lactate, Protonix, Tylenol, Zofran, Zyvox.



LABORATORY DATA:  White blood cells 3.9, hemoglobin 13.2, hematocrit 39,

platelets is 264.  Sodium 144, potassium 4.1, BUN 6, creatinine 0.9.



ASSESSMENT AND PLAN:  Mr. Yo Woodall is a 28-year-old male with

leukopenia; anemia; abnormal liver function test, but trending down;

proteinuria; hematuria; history of hypertension, controlled very well;

_____ with acute pancreatitis, etiology unclear; severe duodenitis,

probably secondary to surrounding pancreatitis with inflammatory changes;

partial gastric outlet type of obstruction with significant edema of the

antrum and duodenum area secondary to pancreatitis.  The patient is

clinically improving, tolerating clear liquid very, very well.  The patient

did have transient thrombocytopenia, probably drug induced.  The patient is

recommended at this point slowly advance the diet to full liquid diet and

we will advance to pureed diet.  In view of his significant duodenal edema,

we will advise the patient to continue only the pureed diet.  Monitor

patient closely.  Getting pain medications.  Gastrointestinal and deep

venous thrombosis prophylaxis.  Repeat labs.  We will follow up.





__________________________________________

Vanda Hayes MD



DD:  05/27/2018 19:48:44

DT:  05/28/2018 0:11:18

Job # [id number]

MTDD

## 2018-05-28 NOTE — PN
DATE:  05/28/2018



SUBJECTIVE:  The patient is in bed, in no acute distress, nontoxic.



PHYSICAL EXAMINATION:

VITAL SIGNS:  Temperature is 98, blood pressure is 112/70, respiratory rate

of 16.

HEENT:  Unremarkable.

NECK:  Supple.

LUNGS:  Decreased breath sounds.

HEART:  Normal S1, S2.

ABDOMEN:  Soft, nontender.



LABORATORY EXAMINATION:  Reveals the patient's white count of 4.7,

hemoglobin of 14.  Chemistries are noted.



ASSESSMENT AND PLAN:  This is a 28-year-old male with sepsis, with acute

pancreatitis and duodenitis and hypertriglyceridemia.  As per Dr. Mooney,

not the cause of this patient's pancreatitis is triglycerides of 868.  He

has duodenitis with questionable air in the wall of the duodenum.  We will

continue the Zosyn.  Upon discharge, may be able to switch to p.o.

Augmentin 875 mg p.o. b.i.d. and complete 10 to 14 days and close follow up

with Dr. Hayes and Dr. Mooney.





__________________________________________

Francis English MD



DD:  05/28/2018 8:54:30

DT:  05/28/2018 9:08:40

Job # [id number]

## 2018-05-29 VITALS
HEART RATE: 61 BPM | OXYGEN SATURATION: 100 % | RESPIRATION RATE: 20 BRPM | DIASTOLIC BLOOD PRESSURE: 81 MMHG | TEMPERATURE: 97.3 F | SYSTOLIC BLOOD PRESSURE: 118 MMHG

## 2018-05-29 LAB
AMYLASE SERPL-CCNC: 134 U/L (ref 35–125)
LIPASE SERPL-CCNC: 1027 U/L (ref 23–300)

## 2018-05-29 RX ADMIN — HYDROMORPHONE HYDROCHLORIDE PRN MG: 1 INJECTION, SOLUTION INTRAMUSCULAR; INTRAVENOUS; SUBCUTANEOUS at 12:41

## 2018-05-29 RX ADMIN — PIPERACILLIN AND TAZOBACTAM SCH MLS/HR: 3; .375 INJECTION, POWDER, LYOPHILIZED, FOR SOLUTION INTRAVENOUS; PARENTERAL at 12:17

## 2018-05-29 RX ADMIN — HYDROMORPHONE HYDROCHLORIDE PRN MG: 2 INJECTION, SOLUTION INTRAMUSCULAR; INTRAVENOUS; SUBCUTANEOUS at 02:16

## 2018-05-29 RX ADMIN — PIPERACILLIN AND TAZOBACTAM SCH MLS/HR: 3; .375 INJECTION, POWDER, LYOPHILIZED, FOR SOLUTION INTRAVENOUS; PARENTERAL at 05:42

## 2018-05-29 RX ADMIN — SUCRALFATE SCH: 1 SUSPENSION ORAL at 11:00

## 2018-05-29 RX ADMIN — HYDROMORPHONE HYDROCHLORIDE PRN MG: 1 INJECTION, SOLUTION INTRAMUSCULAR; INTRAVENOUS; SUBCUTANEOUS at 07:41

## 2018-05-29 NOTE — PN
DATE:  05/26/2018



SUBJECTIVE:  The patient is 28-year-old male.  The patient is seen and

examined on the bedside.  Looking comfortable.  No fever.  No chills.  No

nausea, vomiting, diarrhea.  No hematuria or hematochezia.  No headache. 

No dizziness.  No chest pain.  Abdomen - patient is slowly, slowly getting

better , now he is letting me touch his stomach.  He was getting pain

medication every 2 hours, now the gap has increased to every 4 hours;

tolerating this gap.



PHYSICAL EXAMINATION:

VITAL SIGNS:  Temperature 97.6, pulse 64, blood pressure 127/92,

respiratory rate 18.

HEENT:  Head normocephalic, atraumatic.  Eyes PERRLA.  Extraocular muscles

intact.  Conjunctivae clear.  Nose patent.  Mucous membrane moist.

NECK:  Supple.  No carotid bruit.  No JVD or thyromegaly.

CHEST:  Bilaterally symmetrical.

HEART:  S1 and S2 positive.

LUNGS:  Clear to auscultation.

ABDOMEN:  Soft.  Bowel sounds positive.  No organomegaly.

EXTREMITIES:  No edema.  No cyanosis.

NEUROLOGICAL:  The patient is awake and alert.  Moving all 4 extremities. 

No focal deficits.



LABORATORY DATA:  Hemoglobin 13.2, hematocrit 39, white blood cells 3.9,

platelets 264.  AST 49, ALT 60, amylase level is 154, B12 of 1935.



ASSESSMENT AND PLAN:  Mr. Yo Woodall, 28-year-old male, came with

abdominal pain, found to have pancreatitis, duodenitis, gastritis. 

Etiology of pancreatitis is not clear.  Triglyceride is mildly elevated,

used to be 868, now down to 224.  The patient is getting partial treatment

from [facility] also.  According to him, his ultrasound and the endoscopy

was done in the hospital that shows gastritis.  The patient also had MRI

done, which did not show any obvious defect .  Ultrasound was done also. 

Repeated CAT scan was done.  Magnetic resonance cholangiopancreatography

done also.  The patient is getting partial parenteral nutrition, was still

n.p.o.  Today, Dr. Mooney decided to decrease the pain medication and

start some clear liquid diet.  Clinically, the patient is improving.  He

agrees with that.  Out of bed, physical therapy.  Gastrointestinal, deep

venous thrombosis prophylaxis.  Appreciated Dr. Mooney's input.  We will

follow.





__________________________________________

Vanda Hayes MD



DD:  05/26/2018 20:11:30

DT:  05/26/2018 22:47:53

Job # [id number]

BRANDON

## 2018-05-29 NOTE — PN
DATE:  05/28/2018



SUBJECTIVE:  Patient is a 28-year-old male.  Patient was seen and examined

on bedside on 05/28/2018, looking comfortable.  No nausea, vomiting, or

diarrhea.  No hematuria or hematochezia.  No swelling of the legs.  No

chest pain.  No palpitation.  Abdominal pain is up and down.  He is on

liquid diet.  A length of time discussion done with the patient, with Dr. Mooney.  Patient was trying to convince Dr. Mooney to advance the diet,

but Dr. Mooney trying to convince the patient that, because pancreatic

enzymes are getting trending up, we cannot advance the diet.  But no fever,

no chills.



PHYSICAL EXAMINATION:

VITAL SIGNS:  Temperature 98, blood pressure 112/70, respiratory rate 18.

HEENT:  Head:  Normocephalic and atraumatic.  Eyes:  PERRLA.  Extraocular

muscles intact.  Conjunctivae clear.  Nose patent.

NECK:  Supple.  No carotid bruit, JVD or thyromegaly.

CHEST:  Bilaterally symmetrical.

HEART:  S1 and S2 positive.

LUNGS:  Clear to auscultation.

ABDOMEN:  Soft.  Bowel sounds positive.  No organomegaly.

EXTREMITIES:  No edema.  No cyanosis.

NEUROLOGIC:  Patient is awake and alert.  Moving all 4 extremities.  No

focal deficit.



MEDICATIONS:  Carafate, Catapres TTS 0.3 mg 72 hours, PPN, Colace,

Dilaudid, lactated Ringer, pantoprazole, Tylenol, Zofran, Zosyn.



LABORATORY DATA:  White blood cell is 4.7, hemoglobin 14.5, hematocrit

43.3, platelets 444.  Sodium 144, potassium 4.6, BUN 40, creatinine 1.1,

glucose 95, ALT 60.



ASSESSMENT AND PLAN:  Mr. Yo Woodall is a 28-year-old male with a history

of leukopenia, improved; anemia, improved; history of hypokalemia,

improved; history of hypocalcemia, improved; abnormal liver function tests;

amylase is trending up, on 05/25 it was 124, and on 05/28 it is 154; lipase

on 05/25 was 935 and 05/28 is 1340.  TSH is within normal limit.  Seen by

Dr. English, Infectious Disease because of history of fever at night,

history of sepsis with acute pancreatitis and duodenitis, hepatitis,

anemia.  As per Dr. Mooney, could not know the cause of this patient's

pancreatitis; may be triglycerides of 868.  He had duodenitis with

questionable air in the wall of the duodenum.  We will continue Zosyn as

per Dr. English.  Need to complete the course of Augmentin in 10 to 14

days and close followup of amylase and lipase levels.  Patient is educated

that he needs more treatment, but according to him tomorrow he wants to go

home.  Urged to complete the treatment.  Repeat amylase and lipase.  We

will follow.













__________________________________________

Vanda Hayes MD





DD:  05/29/2018 1:09:05

DT:  05/29/2018 1:57:51

Job # [id number]

## 2018-05-30 NOTE — PN
DATE:  05/29/2018



SUBJECTIVE:  The patient was seen early this morning in room 566, bed 1. 

The patient's pain is improved.  No fevers.  No chills.



PHYSICAL EXAMINATION:

VITAL SIGNS:  Temperature is 97, blood pressure is 120/70, respiratory rate

of 16.

HEENT:  Examination of HEENT is unremarkable.

NECK:  Supple.

LUNGS:  Have decreased breath sounds.

HEART:  Normal S1 and S2.

ABDOMEN:  Soft.



LABORATORY DATA:  Laboratory examination reviewed.  Dr. Hayes's note is

reviewed.



ASSESSMENT AND PLAN:  This is a 28-year-old with sepsis, acute pancreatitis

and duodenitis and hypertriglyceridemia and the patient for possible

discharge, on p.o. Augmentin.  Followup by primary medical doctor and Dr. Mooney.





__________________________________________

Francis English MD





DD:  05/29/2018 21:57:14

DT:  05/29/2018 22:46:49

Job # [id number]

## 2018-06-23 ENCOUNTER — HOSPITAL ENCOUNTER (INPATIENT)
Dept: HOSPITAL 42 - ED | Age: 28
LOS: 2 days | Discharge: LEFT BEFORE DISCHARGE | DRG: 439 | End: 2018-06-25
Attending: INTERNAL MEDICINE | Admitting: INTERNAL MEDICINE
Payer: COMMERCIAL

## 2018-06-23 VITALS — BODY MASS INDEX: 29 KG/M2

## 2018-06-23 DIAGNOSIS — K31.1: ICD-10-CM

## 2018-06-23 DIAGNOSIS — G47.00: ICD-10-CM

## 2018-06-23 DIAGNOSIS — E78.1: ICD-10-CM

## 2018-06-23 DIAGNOSIS — Z80.6: ICD-10-CM

## 2018-06-23 DIAGNOSIS — D64.9: ICD-10-CM

## 2018-06-23 DIAGNOSIS — K56.7: ICD-10-CM

## 2018-06-23 DIAGNOSIS — I10: ICD-10-CM

## 2018-06-23 DIAGNOSIS — K85.90: Primary | ICD-10-CM

## 2018-06-23 DIAGNOSIS — K20.9: ICD-10-CM

## 2018-06-23 DIAGNOSIS — E87.6: ICD-10-CM

## 2018-06-23 DIAGNOSIS — K29.70: ICD-10-CM

## 2018-06-23 DIAGNOSIS — F17.210: ICD-10-CM

## 2018-06-23 LAB
ALBUMIN SERPL-MCNC: 5.1 G/DL (ref 3–4.8)
ALBUMIN/GLOB SERPL: 1.5 {RATIO} (ref 1.1–1.8)
ALT SERPL-CCNC: 50 U/L (ref 7–56)
APTT BLD: 29.7 SECONDS (ref 25.1–36.5)
AST SERPL-CCNC: 54 U/L (ref 17–59)
BASE EXCESS BLDV CALC-SCNC: -1.1 MMOL/L (ref 0–2)
BASOPHILS # BLD AUTO: 0.07 K/MM3 (ref 0–2)
BASOPHILS NFR BLD: 1.8 % (ref 0–3)
BUN SERPL-MCNC: 9 MG/DL (ref 7–21)
CALCIUM SERPL-MCNC: 9.6 MG/DL (ref 8.4–10.5)
EOSINOPHIL # BLD: 0.4 10*3/UL (ref 0–0.7)
EOSINOPHIL NFR BLD: 11.2 % (ref 1.5–5)
ERYTHROCYTE [DISTWIDTH] IN BLOOD BY AUTOMATED COUNT: 12.5 % (ref 11.5–14.5)
GFR NON-AFRICAN AMERICAN: > 60
GRANULOCYTES # BLD: 1.29 10*3/UL (ref 1.4–6.5)
GRANULOCYTES NFR BLD: 32.7 % (ref 50–68)
HGB BLD-MCNC: 16.1 G/DL (ref 14–18)
INR PPP: 1.03 (ref 0.93–1.08)
LIPASE SERPL-CCNC: 368 U/L (ref 23–300)
LYMPHOCYTES # BLD: 1.6 10*3/UL (ref 1.2–3.4)
LYMPHOCYTES NFR BLD AUTO: 41.6 % (ref 22–35)
MCH RBC QN AUTO: 33.5 PG (ref 25–35)
MCHC RBC AUTO-ENTMCNC: 36.5 G/DL (ref 31–37)
MCV RBC AUTO: 91.7 FL (ref 80–105)
MONOCYTES # BLD AUTO: 0.5 10*3/UL (ref 0.1–0.6)
MONOCYTES NFR BLD: 12.7 % (ref 1–6)
PH BLDV: 7.37 [PH] (ref 7.32–7.43)
PLATELET # BLD: 165 10^3/UL (ref 120–450)
PMV BLD AUTO: 10.3 FL (ref 7–11)
PROTHROMBIN TIME: 11.8 SECONDS (ref 9.4–12.5)
RBC # BLD AUTO: 4.81 10^6/UL (ref 3.5–6.1)
TROPONIN I SERPL-MCNC: < 0.01 NG/ML
VENOUS BLOOD FIO2: 21 %
VENOUS BLOOD GAS PCO2: 42 (ref 40–60)
VENOUS BLOOD GAS PO2: 154 MM/HG (ref 30–55)
WBC # BLD AUTO: 3.9 10^3/UL (ref 4.5–11)

## 2018-06-23 RX ADMIN — HYDROMORPHONE HYDROCHLORIDE STA MG: 1 INJECTION, SOLUTION INTRAMUSCULAR; INTRAVENOUS; SUBCUTANEOUS at 08:33

## 2018-06-23 RX ADMIN — HYDROMORPHONE HYDROCHLORIDE PRN MG: 1 INJECTION, SOLUTION INTRAMUSCULAR; INTRAVENOUS; SUBCUTANEOUS at 18:20

## 2018-06-23 RX ADMIN — HYDROMORPHONE HYDROCHLORIDE PRN MG: 1 INJECTION, SOLUTION INTRAMUSCULAR; INTRAVENOUS; SUBCUTANEOUS at 20:48

## 2018-06-23 RX ADMIN — HYDROMORPHONE HYDROCHLORIDE PRN MG: 1 INJECTION, SOLUTION INTRAMUSCULAR; INTRAVENOUS; SUBCUTANEOUS at 14:54

## 2018-06-23 RX ADMIN — HYDROMORPHONE HYDROCHLORIDE PRN MG: 1 INJECTION, SOLUTION INTRAMUSCULAR; INTRAVENOUS; SUBCUTANEOUS at 23:20

## 2018-06-23 RX ADMIN — HYDROMORPHONE HYDROCHLORIDE STA MG: 1 INJECTION, SOLUTION INTRAMUSCULAR; INTRAVENOUS; SUBCUTANEOUS at 08:36
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
## 2018-06-23 NOTE — CT
PROCEDURE:  CT scan abdomen and pelvis dated 06/23/2018 



HISTORY:

Abdominal pain 



COMPARISON:

Comparison made with prior study 05/22/2018



TECHNIQUE:

Contiguous axial images of the abdomen and pelvis performed following 

intravenous injection of approximately 100 cc Omnipaque 350 contrast 

material.  Additional 2D sagittal and coronal reformats generated. 



Radiation dose:



Total exam DLP = 645.71 mGy-cm.



This CT exam was performed using one or more of the following dose 

reduction techniques: Automated exposure control, adjustment of the 

mA and/or kV according to patient size, and/or use of iterative 

reconstruction technique.



FINDINGS:



LOWER THORAX:

Heart size within range of normal. No significant pericardial 

effusion.  



Tiny hiatal hernia. 



Lung bases clear. No infiltrate effusion or basilar pneumothorax. 



LIVER:

The liver is mildly enlarged measuring nearly 19 cm in in CC 

dimension.  Mild fatty hepatic infiltration.  No obvious hepatic mass 

collection or calcification.  Portal and splenic veins are opacified. 

 The



GALLBLADDER AND BILE DUCTS:

Gallbladder is physiologically distended.  No evidence of 

intraluminal gallbladder calculi. 



PANCREAS:

There has been interval improvement previously noted acute 

pancreatitis and surrounding peripancreatic infiltration/ fluid.  No 

obvious pancreatic pseudocysts or other peripancreatic cystic changes 

seen. The proximal pancreatic duct is visible lobe does not appear 

significantly dilated. 



SPLEEN:

The spleen exhibits normal size and attenuation pattern without mass 

collection or calcification. 



ADRENALS:

There are no adrenal lesions. 



KIDNEYS AND URETERS:

The kidneys demonstrate symmetric nephrograms.  No evidence of 

nephrolithiasis or hydronephrosis.



BLADDER:

Urinary bladder physiologically distended. No evidence of 

intraluminal urinary bladder calculi.



REPRODUCTIVE:

Unremarkable. 



APPENDIX:

Normal-appearing appendix of best seen on axial image number [id number] 

[id number]. No periappendiceal inflammatory changes.



BOWEL:

Evaluation of the bowel is limited due to the lack of oral contrast.  

Stomach is incompletely distended which in part accounts for 

thick-walled appearance. . There is a short segment fluid-filled 

minimally distended loop of small bowel left upper/ mid abdomen on 

which may represent localized ileus.  No evidence of acute mechanical 

small bowel obstruction.  Stool and air seen throughout the large 

bowel. No definitive mural wall thickening. 



PERITONEUM:

Unremarkable. No fluid collection. No free air.



LYMPH NODES:

Unremarkable. No enlarged lymph nodes. 



VASCULATURE:

Unremarkable. No aortic aneurysm. 



BONES:

Osseous structures appear intact. Minor multilevel degenerative 

spondylosis of the lower thoracic and lumbar spine



OTHER FINDINGS:

None. 



IMPRESSION:

Interval improvement previously noted changes of pancreatitis.  No 

evidence of pancreatic pseudocyst formation.



Hepatomegaly with fatty hepatic infiltration.



There is a short segment fluid-filled minimally distended loop of 

small bowel left upper/ mid abdomen on which may represent localized 

ileus.  No evidence of acute mechanical the the the small bowel 

obstruction.  Stool and air seen throughout the large bowel.  There 

does appear to be a few scattered colonic diverticula along the 

sigmoid colon.

## 2018-06-23 NOTE — CARD
--------------- APPROVED REPORT --------------





EKG Measurement

Heart [id number]

NC [id number]

[id number]

[id number]

[id number]



<Conclusion>

Normal sinus rhythm

Septal infarct, age undetermined

Abnormal ECG

## 2018-06-23 NOTE — RAD
HISTORY:

upper abdominal pain  



COMPARISON:

Comparison made with chest radiograph 05/19/2018 the the theNo prior. 



FINDINGS:



LUNGS:

No active pulmonary disease.



PLEURA:

No significant pleural effusion identified, no pneumothorax apparent.



CARDIOVASCULAR:

Normal.



OSSEOUS STRUCTURES:

No significant abnormalities.



VISUALIZED UPPER ABDOMEN:

Normal.



OTHER FINDINGS:

None.



IMPRESSION:

No active disease.
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
## 2018-06-24 VITALS — RESPIRATION RATE: 20 BRPM

## 2018-06-24 LAB
HDLC SERPL-MCNC: 46 MG/DL (ref 29–60)
LDLC SERPL-MCNC: 63 MG/DL (ref 0–129)

## 2018-06-24 RX ADMIN — HYDROMORPHONE HYDROCHLORIDE PRN MG: 1 INJECTION, SOLUTION INTRAMUSCULAR; INTRAVENOUS; SUBCUTANEOUS at 15:01

## 2018-06-24 RX ADMIN — HYDROMORPHONE HYDROCHLORIDE PRN MG: 1 INJECTION, SOLUTION INTRAMUSCULAR; INTRAVENOUS; SUBCUTANEOUS at 02:37

## 2018-06-24 RX ADMIN — HYDROMORPHONE HYDROCHLORIDE PRN MG: 1 INJECTION, SOLUTION INTRAMUSCULAR; INTRAVENOUS; SUBCUTANEOUS at 21:32

## 2018-06-24 RX ADMIN — HYDROMORPHONE HYDROCHLORIDE PRN MG: 1 INJECTION, SOLUTION INTRAMUSCULAR; INTRAVENOUS; SUBCUTANEOUS at 08:45

## 2018-06-24 RX ADMIN — HYDROMORPHONE HYDROCHLORIDE PRN MG: 1 INJECTION, SOLUTION INTRAMUSCULAR; INTRAVENOUS; SUBCUTANEOUS at 17:59

## 2018-06-24 RX ADMIN — HYDROMORPHONE HYDROCHLORIDE PRN MG: 1 INJECTION, SOLUTION INTRAMUSCULAR; INTRAVENOUS; SUBCUTANEOUS at 23:36

## 2018-06-24 RX ADMIN — HYDROMORPHONE HYDROCHLORIDE PRN MG: 1 INJECTION, SOLUTION INTRAMUSCULAR; INTRAVENOUS; SUBCUTANEOUS at 11:51

## 2018-06-24 RX ADMIN — HYDROMORPHONE HYDROCHLORIDE PRN MG: 1 INJECTION, SOLUTION INTRAMUSCULAR; INTRAVENOUS; SUBCUTANEOUS at 05:45
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
## 2018-06-24 NOTE — CP.PCM.PN
Subjective





- Date & Time of Evaluation


Date of Evaluation: 06/24/18


Time of Evaluation: 22:56





- Subjective


Subjective: 


S:Patient was seen because he had requested a sleeping pill.


    States that he has not been able to sleep for past 3 days.


    Has no other complaints now.


    Medical record was reviewed.


    Has been on IV dilaudid for pain.





O:


Last Vital Signs





 3





Temp  98.4 F   06/24/18 14:00


 


Pulse  78   06/24/18 16:50


 


Resp  20   06/24/18 14:00


 


BP  148/95 H  06/24/18 16:50


 


Pulse Ox  96   06/24/18 14:00














   Awake, alert, not in distress.


   LUNGS: Normal sleeping pattern.


   NEURO: Speech normal.





A: Insomnia.








P:Ambien 5 mg PO x 1.


   If not effective, will give 5 mg Extra Ambien.





Objective





- Vital Signs/Intake and Output


Vital Signs (last 24 hours): 


 











Temp Pulse Resp BP Pulse Ox


 


 98.4 F   78   20   148/95 H  96 


 


 06/24/18 14:00  06/24/18 16:50  06/24/18 14:00  06/24/18 16:50  06/24/18 14:00








Intake and Output: 


 











 06/24/18 06/25/18





 18:59 06:59


 


Intake Total 15404 


 


Balance 03775 














- Medications


Medications: 


 Current Medications





Hydromorphone HCl (Dilaudid)  0.5 mg IVP Q2H PRN


   PRN Reason: Pain, severe (8-10)


   Last Admin: 06/24/18 21:32 Dose:  0.5 mg


Sodium Chloride (Sodium Chloride 0.9%)  1,000 mls @ 150 mls/hr IV .Q6H40M [facility]


   Last Admin: 06/24/18 21:39 Dose:  150 mls/hr


Lisinopril (Zestril)  10 mg PO DAILY [facility]


Ondansetron HCl (Zofran Inj)  4 mg IVP Q6H PRN


   PRN Reason: Nausea/Vomiting


   Last Admin: 06/24/18 08:48 Dose:  4 mg


Pantoprazole Sodium (Protonix Inj)  40 mg IVP DAILY [facility]


   Last Admin: 06/24/18 11:26 Dose:  40 mg











- Labs


Labs: 


 











PT  11.8 SECONDS (9.4-12.5)   06/23/18  08:00    


 


INR  1.03  (0.93-1.08)   06/23/18  08:00    


 


APTT  29.7 Seconds (25.1-36.5)   06/23/18  08:00

## 2018-06-24 NOTE — CP.PCM.PN
Subjective





- Date & Time of Evaluation


Date of Evaluation: 06/24/18


Time of Evaluation: 07:25





- Subjective


Subjective: 





General surgery progress note for Dr. Jazmín Pires, PGY-1





Pt S & E at bedside at 0645








Pt reports continued abdominal pain overnight, not well controlled. Also admits 

to nausea, alleviated by anti-emetic, did not sleep last night due to pain. 





Objective





- Vital Signs/Intake and Output


Vital Signs (last 24 hours): 


 











Temp Pulse Resp BP Pulse Ox


 


 98.1 F   78   20   142/96 H  97 


 


 06/24/18 06:00  06/24/18 06:00  06/24/18 06:00  06/24/18 06:00  06/24/18 06:00











- Medications


Medications: 


 Current Medications





Hydromorphone HCl (Dilaudid)  0.5 mg IVP Q3H PRN


   PRN Reason: Pain, severe (8-10)


   Last Admin: 06/24/18 05:45 Dose:  0.5 mg


Sodium Chloride (Sodium Chloride 0.9%)  1,000 mls @ 150 mls/hr IV .Q6H40M [facility]


   Last Admin: 06/24/18 05:53 Dose:  150 mls/hr


Ondansetron HCl (Zofran Inj)  4 mg IVP Q6H PRN


   PRN Reason: Nausea/Vomiting


   Last Admin: 06/23/18 23:23 Dose:  4 mg


Pantoprazole Sodium (Protonix Inj)  40 mg IVP DAILY [facility]


   Last Admin: 06/23/18 14:56 Dose:  40 mg











- Labs


Labs: 


 











PT  11.8 SECONDS (9.4-12.5)   06/23/18  08:00    


 


INR  1.03  (0.93-1.08)   06/23/18  08:00    


 


APTT  29.7 Seconds (25.1-36.5)   06/23/18  08:00    














- Constitutional


Appears: Non-toxic, No Acute Distress





- Head Exam


Head Exam: ATRAUMATIC, NORMAL INSPECTION, NORMOCEPHALIC





- Eye Exam


Eye Exam: EOMI, Normal appearance





- ENT Exam


ENT Exam: Mucous Membranes Moist, Normal Exam





- Neck Exam


Neck Exam: Full ROM, Normal Inspection





- Respiratory Exam


Respiratory Exam: NORMAL BREATHING PATTERN





- Cardiovascular Exam


Cardiovascular Exam: REGULAR RHYTHM, +S1





- GI/Abdominal Exam


GI & Abdominal Exam: Guarding, Soft, Tenderness (epigastric).  absent: Distended

, Firm, Rigid, Mass, Rebound





- Extremities Exam


Extremities Exam: Normal Inspection.  absent: Tenderness





- Neurological Exam


Neurological Exam: Alert, Awake, CN II-XII Intact, Oriented x3





- Psychiatric Exam


Psychiatric exam: Normal Affect, Normal Mood





- Skin


Skin Exam: Dry, Intact, Normal Color, Warm





Assessment and Plan





- Assessment and Plan (Free Text)


Assessment: 





28M w/PMH sig for pancreatitis, duodenitis, gastritis consulted for epigastric 

abdominal pain x 1 day with emesis, due to pancreatitis and gastric outlet 

obstruction





Plan: 





Cont NPO 


Cont IVF


Cont Pain control


Cont anti-emetic


Cont PPI


Avoid NSAIDs 2/2 gastritis


FU GI recs


Further recs pending attending evaluation





Will JEVON attending





Lexis, PGY-1

## 2018-06-25 VITALS
DIASTOLIC BLOOD PRESSURE: 72 MMHG | HEART RATE: 76 BPM | OXYGEN SATURATION: 100 % | TEMPERATURE: 98.8 F | SYSTOLIC BLOOD PRESSURE: 160 MMHG

## 2018-06-25 LAB
BUN SERPL-MCNC: 4 MG/DL (ref 7–21)
CALCIUM SERPL-MCNC: 8.9 MG/DL (ref 8.4–10.5)
ERYTHROCYTE [DISTWIDTH] IN BLOOD BY AUTOMATED COUNT: 12.6 % (ref 11.5–14.5)
GFR NON-AFRICAN AMERICAN: > 60
HGB BLD-MCNC: 13.1 G/DL (ref 14–18)
MCH RBC QN AUTO: 32.6 PG (ref 25–35)
MCHC RBC AUTO-ENTMCNC: 34.8 G/DL (ref 31–37)
MCV RBC AUTO: 93.5 FL (ref 80–105)
PLATELET # BLD: 133 10^3/UL (ref 120–450)
PMV BLD AUTO: 10.3 FL (ref 7–11)
RBC # BLD AUTO: 4.02 10^6/UL (ref 3.5–6.1)
WBC # BLD AUTO: 3.9 10^3/UL (ref 4.5–11)

## 2018-06-25 RX ADMIN — HYDROMORPHONE HYDROCHLORIDE PRN MG: 1 INJECTION, SOLUTION INTRAMUSCULAR; INTRAVENOUS; SUBCUTANEOUS at 12:49

## 2018-06-25 RX ADMIN — HYDROMORPHONE HYDROCHLORIDE PRN MG: 1 INJECTION, SOLUTION INTRAMUSCULAR; INTRAVENOUS; SUBCUTANEOUS at 07:36

## 2018-06-25 RX ADMIN — HYDROMORPHONE HYDROCHLORIDE PRN MG: 1 INJECTION, SOLUTION INTRAMUSCULAR; INTRAVENOUS; SUBCUTANEOUS at 10:11

## 2018-06-25 RX ADMIN — HYDROMORPHONE HYDROCHLORIDE PRN MG: 1 INJECTION, SOLUTION INTRAMUSCULAR; INTRAVENOUS; SUBCUTANEOUS at 04:19
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
## 2018-06-25 NOTE — CON
HISTORY OF PRESENT ILLNESS:  Seen on the floor, admitted with pancreatitis,

was here a month ago with the same diagnosis.  He was admitted with a white

count of 3.9, hemoglobin 16 on admission and now 13, coags normal.  His

albumin is 5.1, liver functions are normal, lipase is 368.  CAT scan

reviewed, read by Dr. Bernard with localized ileus in the transverse small

bowel.  There is pancreatitis in the pancreas that is improved from before.

There is a slightly dilated pancreatic duct and some fluid around it.



He had a glass of beer, [employer] and [employer] sandwich prior to having

the pain.



PAST SURGICAL HISTORY:  Unremarkable except for trauma to the hand.



SOCIAL HISTORY:  He is a smoker, half pack a day, down to.  Denies

drinking, he said he had a glass of beer prior to this event or actually

[employer].  Family apparently says he does drink.



Workup in the past failed to reveal a stone, this including an MRCP on

05/19, read by Dr. Meng.



PHYSICAL EXAMINATION:  He has tenderness throughout the abdomen, but mostly

in the right upper quadrant, pain is better than it was, but not completely

resolved.



IMPRESSION:  Resolving pancreatitis, probably related to alcohol.  My

feeling is that he should not drink at all, I would say for at least 6

months, I am not sure he will comply.  GI is involved, report is pending. 

I will follow up.







__________________________________________

Nicolás Alicea MD



DD:  06/25/2018 11:50:28

DT:  06/25/2018 13:32:12

Job # [id number]

## 2018-06-25 NOTE — CP.PCM.PN
Subjective





- Date & Time of Evaluation


Date of Evaluation: 06/25/18


Time of Evaluation: 07:00





- Subjective


Subjective: 


GI Progress Note for HOLLY Rodriguez PGY2





Patient seen and examined at bedside. There were no acute overnight events as 

per nursing staff. Patient still complains of diffuse abdominal pain which is 

mildly better. He denies nausea/vomiting/diarrhea, fever/chills, numbness/

tingling, chest pain or shortness of breath. 





Objective





- Vital Signs/Intake and Output


Vital Signs (last 24 hours): 


 











Temp Pulse Resp BP Pulse Ox


 


 97.8 F   60   20   127/86   99 


 


 06/25/18 06:00  06/25/18 10:10  06/25/18 06:00  06/25/18 10:10  06/25/18 06:00








Intake and Output: 


 











 06/25/18 06/25/18





 06:59 18:59


 


Intake Total 780 


 


Balance 780 














- Medications


Medications: 


 Current Medications





Hydromorphone HCl (Dilaudid)  0.5 mg IVP Q2H PRN


   PRN Reason: Pain, severe (8-10)


   Last Admin: 06/25/18 10:11 Dose:  0.5 mg


Sodium Chloride (Sodium Chloride 0.9%)  1,000 mls @ 150 mls/hr IV .Q6H40M [facility]


   Last Admin: 06/24/18 21:39 Dose:  150 mls/hr


Lisinopril (Zestril)  10 mg PO DAILY [facility]


   Last Admin: 06/25/18 10:10 Dose:  10 mg


Ondansetron HCl (Zofran Inj)  4 mg IVP Q6H PRN


   PRN Reason: Nausea/Vomiting


   Last Admin: 06/24/18 08:48 Dose:  4 mg


Pantoprazole Sodium (Protonix Inj)  40 mg IVP DAILY [facility]


   Last Admin: 06/25/18 10:10 Dose:  40 mg











- Labs


Labs: 


 





 06/25/18 06:51 





 06/25/18 06:51 





 











PT  11.8 SECONDS (9.4-12.5)   06/23/18  08:00    


 


INR  1.03  (0.93-1.08)   06/23/18  08:00    


 


APTT  29.7 Seconds (25.1-36.5)   06/23/18  08:00    














- Constitutional


Appears: No Acute Distress





- Head Exam


Head Exam: ATRAUMATIC, NORMAL INSPECTION, NORMOCEPHALIC





- Eye Exam


Eye Exam: Normal appearance, PERRL


Pupil Exam: NORMAL ACCOMODATION





- ENT Exam


ENT Exam: Mucous Membranes Moist





- Respiratory Exam


Respiratory Exam: Clear to Ausculation Bilateral, NORMAL BREATHING PATTERN.  

absent: Rales, Rhonchi, Wheezes





- Cardiovascular Exam


Cardiovascular Exam: REGULAR RHYTHM, +S1, +S2.  absent: Gallop, Rubs, Murmur





- GI/Abdominal Exam


GI & Abdominal Exam: Soft, Tenderness (diffuse ), Normal Bowel Sounds.  absent: 

Rigid, Mass, Rebound





- Extremities Exam


Extremities Exam: Normal Inspection.  absent: Calf Tenderness, Pedal Edema





- Neurological Exam


Neurological Exam: Alert, Awake, CN II-XII Intact, Oriented x3





- Skin


Skin Exam: Dry, Warm





Assessment and Plan





- Assessment and Plan (Free Text)


Assessment: 


This is a 27yo male with past medical history of pancreatitis (unknown cause), 

abdominal trauma during  service who was admitted for 





1. Abdominal pain 


   - Secondary to pancreatitis 


2. Hx of esophagitis and gastritis 


   - [facility] 





Plan: 


Continue conservative management with pain control and IV fluids. Advance diet 

as tolerated. Continue Zofran and PPI. [facility] for further evaluation since he had recent work up with GI 

doctor there. Consider repeat outpatient scope. If constipation, will add 

Miralax prn. 





Case seen, discussed and reviewed with Dr. Jesica Galeano PGY2
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
## 2018-12-31 ENCOUNTER — HOSPITAL ENCOUNTER (OUTPATIENT)
Dept: HOSPITAL 42 - ED | Age: 28
Setting detail: OBSERVATION
LOS: 1 days | Discharge: LEFT BEFORE DISCHARGE | End: 2019-01-01
Attending: INTERNAL MEDICINE | Admitting: INTERNAL MEDICINE
Payer: COMMERCIAL

## 2018-12-31 VITALS — RESPIRATION RATE: 20 BRPM

## 2018-12-31 VITALS — OXYGEN SATURATION: 99 %

## 2018-12-31 VITALS — BODY MASS INDEX: 29.5 KG/M2

## 2018-12-31 DIAGNOSIS — Z87.11: ICD-10-CM

## 2018-12-31 DIAGNOSIS — K85.90: Primary | ICD-10-CM

## 2018-12-31 DIAGNOSIS — K29.20: ICD-10-CM

## 2018-12-31 DIAGNOSIS — K21.9: ICD-10-CM

## 2018-12-31 DIAGNOSIS — Z91.19: ICD-10-CM

## 2018-12-31 DIAGNOSIS — F17.200: ICD-10-CM

## 2018-12-31 DIAGNOSIS — K86.0: ICD-10-CM

## 2018-12-31 LAB
ALBUMIN SERPL-MCNC: 4.9 G/DL (ref 3–4.8)
ALBUMIN/GLOB SERPL: 1.6 {RATIO} (ref 1.1–1.8)
ALT SERPL-CCNC: 37 U/L (ref 7–56)
AST SERPL-CCNC: 38 U/L (ref 17–59)
BUN SERPL-MCNC: 11 MG/DL (ref 7–21)
CALCIUM SERPL-MCNC: 9.7 MG/DL (ref 8.4–10.5)
ERYTHROCYTE [DISTWIDTH] IN BLOOD BY AUTOMATED COUNT: 12.2 % (ref 11.5–14.5)
GFR NON-AFRICAN AMERICAN: > 60
HGB BLD-MCNC: 17 G/DL (ref 14–18)
LIPASE SERPL-CCNC: 288 U/L (ref 23–300)
MCH RBC QN AUTO: 33.8 PG (ref 25–35)
MCHC RBC AUTO-ENTMCNC: 35.3 G/DL (ref 31–37)
MCV RBC AUTO: 95.6 FL (ref 80–105)
PLATELET # BLD: 202 10^3/UL (ref 120–450)
PMV BLD AUTO: 10.5 FL (ref 7–11)
RBC # BLD AUTO: 5.03 10^6/UL (ref 3.5–6.1)
WBC # BLD AUTO: 5.5 10^3/UL (ref 4.5–11)

## 2018-12-31 PROCEDURE — 83690 ASSAY OF LIPASE: CPT

## 2018-12-31 PROCEDURE — 99284 EMERGENCY DEPT VISIT MOD MDM: CPT

## 2018-12-31 PROCEDURE — 85027 COMPLETE CBC AUTOMATED: CPT

## 2018-12-31 PROCEDURE — 71045 X-RAY EXAM CHEST 1 VIEW: CPT

## 2018-12-31 PROCEDURE — 96375 TX/PRO/DX INJ NEW DRUG ADDON: CPT

## 2018-12-31 PROCEDURE — 96376 TX/PRO/DX INJ SAME DRUG ADON: CPT

## 2018-12-31 PROCEDURE — 96361 HYDRATE IV INFUSION ADD-ON: CPT

## 2018-12-31 PROCEDURE — 96374 THER/PROPH/DIAG INJ IV PUSH: CPT

## 2018-12-31 PROCEDURE — 80053 COMPREHEN METABOLIC PANEL: CPT

## 2018-12-31 RX ADMIN — HYDROMORPHONE HYDROCHLORIDE PRN MG: 1 INJECTION, SOLUTION INTRAMUSCULAR; INTRAVENOUS; SUBCUTANEOUS at 16:01

## 2018-12-31 RX ADMIN — HYDROMORPHONE HYDROCHLORIDE PRN MG: 1 INJECTION, SOLUTION INTRAMUSCULAR; INTRAVENOUS; SUBCUTANEOUS at 12:29

## 2018-12-31 RX ADMIN — HYDROMORPHONE HYDROCHLORIDE PRN MG: 1 INJECTION, SOLUTION INTRAMUSCULAR; INTRAVENOUS; SUBCUTANEOUS at 09:17

## 2018-12-31 RX ADMIN — HYDROMORPHONE HYDROCHLORIDE PRN MG: 1 INJECTION, SOLUTION INTRAMUSCULAR; INTRAVENOUS; SUBCUTANEOUS at 20:18
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
## 2018-12-31 NOTE — RAD
Date of service: 



12/31/2018



HISTORY:

 abdominal pain 



COMPARISON:

06/23/2018 



FINDINGS:



LUNGS:

No active pulmonary disease.



PLEURA:

No significant pleural effusion identified, no pneumothorax apparent.



CARDIOVASCULAR:

No aortic atherosclerotic calcification present.



Normal cardiac size. No pulmonary vascular congestion. 



OSSEOUS STRUCTURES:

No significant abnormalities.



VISUALIZED UPPER ABDOMEN:

Normal.



OTHER FINDINGS:

None.



IMPRESSION:

No active disease.
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
## 2019-01-01 VITALS — HEART RATE: 62 BPM | TEMPERATURE: 97.9 F

## 2019-01-01 VITALS — SYSTOLIC BLOOD PRESSURE: 140 MMHG | DIASTOLIC BLOOD PRESSURE: 88 MMHG

## 2019-01-01 RX ADMIN — HYDROMORPHONE HYDROCHLORIDE PRN MG: 1 INJECTION, SOLUTION INTRAMUSCULAR; INTRAVENOUS; SUBCUTANEOUS at 00:41

## 2019-01-01 RX ADMIN — HYDROMORPHONE HYDROCHLORIDE PRN MG: 1 INJECTION, SOLUTION INTRAMUSCULAR; INTRAVENOUS; SUBCUTANEOUS at 08:59

## 2019-01-01 RX ADMIN — HYDROMORPHONE HYDROCHLORIDE PRN MG: 1 INJECTION, SOLUTION INTRAMUSCULAR; INTRAVENOUS; SUBCUTANEOUS at 12:19

## 2019-01-01 RX ADMIN — HYDROMORPHONE HYDROCHLORIDE PRN MG: 1 INJECTION, SOLUTION INTRAMUSCULAR; INTRAVENOUS; SUBCUTANEOUS at 04:53

## 2019-01-01 NOTE — CP.PCM.PN
<Mayo Cummings - Last Filed: 01/01/19 17:10>





Subjective





- Date & Time of Evaluation


Date of Evaluation: 01/01/19


Time of Evaluation: 07:30





- Subjective


Subjective: 





Mayo Cummings DO, PGY1. GI Progress note for Dr Mooney





Patient seen and examined at bedside. No significant events overnight. He still 

have abdominal pain with minimal response to pain meds. Patient denied N/V/D, 

chest pain, SOB, palpitations, fever, chills





Objective





- Vital Signs/Intake and Output


Vital Signs (last 24 hours): 


                                        











Temp Pulse Resp BP Pulse Ox


 


 97.9 F   62   20   140/88   99 


 


 01/01/19 06:00  01/01/19 06:00  01/01/19 06:00  01/01/19 10:21  01/01/19 06:00











- Medications


Medications: 


                               Current Medications





Hydromorphone HCl (Dilaudid)  1 mg IVP Q4H PRN


   PRN Reason: Pain, severe (8-10)


   Last Admin: 01/01/19 08:59 Dose:  1 mg


Dextrose/Sodium Chloride (Dextrose 5%/0.45% Ns 1000 Ml)  1,000 mls @ 100 mls/hr 

IV .Q10H CHANCE


   Last Admin: 01/01/19 00:41 Dose:  100 mls/hr


Ondansetron HCl (Zofran Inj)  4 mg IVP Q6H PRN


   PRN Reason: Nausea/Vomiting


   Last Admin: 01/01/19 02:17 Dose:  4 mg











- Labs


Labs: 


                                        





                                 12/31/18 04:20 





                                 12/31/18 04:20 











- Constitutional


Appears: Well, No Acute Distress





- Head Exam


Head Exam: ATRAUMATIC, NORMAL INSPECTION, NORMOCEPHALIC





- Eye Exam


Eye Exam: EOMI, Normal appearance, PERRL


Pupil Exam: NORMAL ACCOMODATION, PERRL





- ENT Exam


ENT Exam: Mucous Membranes Moist





- Neck Exam


Neck Exam: Full ROM, Normal Inspection.  absent: Lymphadenopathy





- Respiratory Exam


Respiratory Exam: Clear to Ausculation Bilateral, NORMAL BREATHING PATTERN





- GI/Abdominal Exam


GI & Abdominal Exam: Soft, Tenderness (TTP in LUQ, left mid abdomen), 

Hyperactive Bowel Sounds.  absent: Mass, Organomegaly





- Extremities Exam


Extremities Exam: Full ROM, Normal Capillary Refill, Normal Inspection.  absent:

Joint Swelling, Pedal Edema





- Back Exam


Back Exam: NORMAL INSPECTION





- Neurological Exam


Neurological Exam: Alert, Awake, CN II-XII Intact, Oriented x3





- Psychiatric Exam


Psychiatric exam: Anxious





- Skin


Skin Exam: Dry, Intact, Normal Color, Warm





Assessment and Plan





- Assessment and Plan (Free Text)


Assessment: 





29 y/o male with PMH of pancreatitis, esophagitis and gastritis [place] [facility] 

[place] left sided abdominal pain that started after drinking alcohol. 


Plan: 








abdominal pain:


-likely due to alcoholic pancreatitis. symptoms occurred after alcohol ingestion


-continue conservative management with pain control and IVF


-clear liquid diet


-continue Zofran and PPI


-patient had recent EUS at [facility] with normal results


-multiple CT A/P during the past few month consistent with pancreatitis without 

complications


-patient counseled for alcohol and smoking cessation to reduce hospital 

admissions


-patient's pain is not controlled with current pain meds. pain management 

consulted





Case reviewed and plan discussed with Dr Jesica Cummings, DO





<Gallo Mooney - Last Filed: 01/01/19 23:53>





Objective





- Vital Signs/Intake and Output


Vital Signs (last 24 hours): 


                                        











Temp Pulse Resp BP Pulse Ox


 


 97.9 F   62   20   140/88   99 


 


 01/01/19 06:00  01/01/19 06:00  01/01/19 06:00  01/01/19 10:21  01/01/19 06:00











- Labs


Labs: 


                                        





                                 12/31/18 04:20 





                                 12/31/18 04:20 











Attending/Attestation





- Attestation


I have personally seen and examined this patient.: No


I have fully participated in the care of the patient.: Yes


I have reviewed all pertinent clinical information, including history, physical 

exam and plan: Yes


Notes (Text): 


This is an addendum to GI followup report dictated by the Medical Resident.  

Medical records, lab studies, imagings were reviewed.  Last 24 hours events 

reviewed.  Agreed with the above treatment plan as outlined in Medical Resident 

's notes with the addition of the following 


patient was poorly compliant with follow-up and lifestyle modification


History of pancreatitis and peptic ulcer disease


Patient was explained at length earlier regarding importance of avoiding alcohol

and stop smoking


Will review records from [facility] before considering further workup


Explained to patient earlier thet we Would consider repeat CT if any further 

worsening  of the pain


However Patient signed out AMA


01/01/19 23:50

## 2019-01-02 NOTE — HP
CHIEF COMPLAINT:  A 28-year-old male with past medical history of

pancreatitis, came in with abdominal pain.



HISTORY OF PRESENT ILLNESS:  This is a 28-year-old male with past medical

history of pancreatitis, esophagitis, gastritis, admitted to the hospital

with abdominal pain.  Patient stated that this happened after he started

drinking alcohol.  Patient has multiple times recurrent upper abdominal

pain due to pancreatitis.  At this time he drank a couple of beers which

started, right after, upper abdominal pain.  Patient had multiple CAT scans

in the hospital for his abdominal pains.  He did have nausea, he did have

vomiting. Patient also according to him had a few episodes of rectal

bleeding.  Denied any chest pain, any shortness of breath, any diarrhea or

constipation.



ALLERGY:  NO KNOWN ALLERGY.



SOCIAL HISTORY:  He has a girlfriend, no other problem.  Socially he

drinks, used tobacco, vape sometimes but not using any IV drugs.



PAST MEDICAL HISTORY:  As above.



REVIEW OF SYSTEMS:  As in the present illness, otherwise negative.



PHYSICAL EXAMINATION

VITAL SIGNS:  When he came in he had on December 31, temperature is 98.7,

heart rate 108, blood pressure 147/78, respirations 20, and saturation 97%

on room air.

HEAD AND NECK:  Examination is normal.  No JVD.  No thyromegaly.

CHEST:  Exam clear bilaterally.

CARDIAC:  First sound and second sound normal.

ABDOMEN:  Tender in the epigastric area.

EXTREMITIES:  No edema.

NEUROLOGIC:  Normal.



LABORATORY DATA:  Patient had laboratory studies which showed sodium 141,

potassium 4, chloride 107, bicarb 24, BUN 11, creatinine 1.0.  Liver

function test is normal.  Total protein 8.1, albumin 4.9, globulin 3.2,

lipase 288.  CBC shows white count 5.5, hemoglobin 17, hematocrit 48.1, and

platelets 202.



Patient also had when he came in, he had a chest x-ray and has no active

disease.  He refused any CAT scan, he had multiple CAT scans and he does

not want any radiation because of high risk of radiation with his medical

condition according to the patient.



IMPRESSION AND PLAN:  This is a 28-year-old male with history of recurrent

pancreatitis due to alcohol with multiple CAT scans and radiological

studies in the past and seen by Dr. Mooney multiple times for the same

problem.  Patient will be admitted for probably recurrent acute

pancreatitis, it seems stable, or alcoholic gastritis.  According to the

patient he mentioned some blood in the stool, it is not contreras blood, it is

blood mixed with stool.  Patient has a hemoglobin 17, he is stable and

hemodynamically stable.  We will admit the patient on IV fluid, Protonix

IV, Dilaudid p.r.n.  GI consult by Dr. Mooney.  Continue GI and DVT

prophylaxis.





__________________________________________

Dev Linder MD





DD:  01/01/2019 19:06:43

DT:  01/01/2019 20:38:40

Job # [id number]

## 2019-01-02 NOTE — DS
HISTORY OF PRESENT ILLNESS:  The patient was admitted with acute

pancreatitis, seen by GI consult, Dr. Mooney.  The patient's labs were

within normal.  He is clinically stable.  Dilaudid, Protonix, and some

liquids.  No vomiting noted.  The patient signed against medical advice. 

He is supposed to be receiving some of his medications, but he left.  He is

going to go to [facility].  _____ including worsened pancreatitis,

pseudocyst bleeding from neglected medical care.  The patient was advised

to stay; however, he decided to leave against medical advice.



DISCHARGE DIAGNOSES:

1.  Acute pancreatitis.

2.  Abdominal pain.

3.  History of alcohol use disorders.



PLAN:  The patient is advised not to drink alcohol again, if he need any

assistance to quit alcohol, he can follow up in the office for medication

to assist him was not drinking.  He will followup at [facility].  GI

consultation was ordered.  Pain management consultation was ordered.







__________________________________________

Dev Linder MD





DD:  01/01/2019 19:09:10

DT:  01/01/2019 23:01:14

Job # [id number]

## 2019-08-07 ENCOUNTER — INPATIENT (INPATIENT)
Facility: HOSPITAL | Age: 29
LOS: 1 days | Discharge: HOME | DRG: 446 | End: 2019-08-09
Attending: HOSPITALIST | Admitting: HOSPITALIST
Payer: COMMERCIAL

## 2019-08-07 VITALS
TEMPERATURE: 98 F | OXYGEN SATURATION: 98 % | DIASTOLIC BLOOD PRESSURE: 84 MMHG | HEIGHT: 74 IN | WEIGHT: 220.02 LBS | SYSTOLIC BLOOD PRESSURE: 116 MMHG | RESPIRATION RATE: 19 BRPM | HEART RATE: 86 BPM

## 2019-08-07 DIAGNOSIS — Z29.9 ENCOUNTER FOR PROPHYLACTIC MEASURES, UNSPECIFIED: ICD-10-CM

## 2019-08-07 DIAGNOSIS — R10.84 GENERALIZED ABDOMINAL PAIN: ICD-10-CM

## 2019-08-07 DIAGNOSIS — R74.0 NONSPECIFIC ELEVATION OF LEVELS OF TRANSAMINASE AND LACTIC ACID DEHYDROGENASE [LDH]: ICD-10-CM

## 2019-08-07 DIAGNOSIS — K85.90 ACUTE PANCREATITIS WITHOUT NECROSIS OR INFECTION, UNSPECIFIED: ICD-10-CM

## 2019-08-07 LAB
ALBUMIN SERPL ELPH-MCNC: 4.9 G/DL — SIGNIFICANT CHANGE UP (ref 3.5–5)
ALP SERPL-CCNC: 75 U/L — SIGNIFICANT CHANGE UP (ref 40–120)
ALT FLD-CCNC: 312 U/L DA — HIGH (ref 10–60)
AMPHET UR-MCNC: NEGATIVE — SIGNIFICANT CHANGE UP
ANION GAP SERPL CALC-SCNC: 12 MMOL/L — SIGNIFICANT CHANGE UP (ref 5–17)
APPEARANCE UR: CLEAR — SIGNIFICANT CHANGE UP
AST SERPL-CCNC: 323 U/L — HIGH (ref 10–40)
BARBITURATES UR SCN-MCNC: NEGATIVE — SIGNIFICANT CHANGE UP
BASOPHILS # BLD AUTO: 0.08 K/UL — SIGNIFICANT CHANGE UP (ref 0–0.2)
BASOPHILS NFR BLD AUTO: 2.3 % — HIGH (ref 0–2)
BENZODIAZ UR-MCNC: NEGATIVE — SIGNIFICANT CHANGE UP
BILIRUB SERPL-MCNC: 0.9 MG/DL — SIGNIFICANT CHANGE UP (ref 0.2–1.2)
BILIRUB UR-MCNC: NEGATIVE — SIGNIFICANT CHANGE UP
BUN SERPL-MCNC: 6 MG/DL — LOW (ref 7–18)
CALCIUM SERPL-MCNC: 9.5 MG/DL — SIGNIFICANT CHANGE UP (ref 8.4–10.5)
CHLORIDE SERPL-SCNC: 99 MMOL/L — SIGNIFICANT CHANGE UP (ref 96–108)
CO2 SERPL-SCNC: 27 MMOL/L — SIGNIFICANT CHANGE UP (ref 22–31)
COCAINE METAB.OTHER UR-MCNC: NEGATIVE — SIGNIFICANT CHANGE UP
COLOR SPEC: YELLOW — SIGNIFICANT CHANGE UP
CREAT SERPL-MCNC: 1.21 MG/DL — SIGNIFICANT CHANGE UP (ref 0.5–1.3)
DIFF PNL FLD: NEGATIVE — SIGNIFICANT CHANGE UP
EOSINOPHIL # BLD AUTO: 0.1 K/UL — SIGNIFICANT CHANGE UP (ref 0–0.5)
EOSINOPHIL NFR BLD AUTO: 2.8 % — SIGNIFICANT CHANGE UP (ref 0–6)
GLUCOSE SERPL-MCNC: 96 MG/DL — SIGNIFICANT CHANGE UP (ref 70–99)
GLUCOSE UR QL: NEGATIVE — SIGNIFICANT CHANGE UP
HCT VFR BLD CALC: 50.1 % — HIGH (ref 39–50)
HGB BLD-MCNC: 17.2 G/DL — HIGH (ref 13–17)
IMM GRANULOCYTES NFR BLD AUTO: 0.3 % — SIGNIFICANT CHANGE UP (ref 0–1.5)
KETONES UR-MCNC: ABNORMAL
LEUKOCYTE ESTERASE UR-ACNC: NEGATIVE — SIGNIFICANT CHANGE UP
LIDOCAIN IGE QN: 350 U/L — SIGNIFICANT CHANGE UP (ref 73–393)
LYMPHOCYTES # BLD AUTO: 1.23 K/UL — SIGNIFICANT CHANGE UP (ref 1–3.3)
LYMPHOCYTES # BLD AUTO: 34.6 % — SIGNIFICANT CHANGE UP (ref 13–44)
MCHC RBC-ENTMCNC: 32.9 PG — SIGNIFICANT CHANGE UP (ref 27–34)
MCHC RBC-ENTMCNC: 34.3 GM/DL — SIGNIFICANT CHANGE UP (ref 32–36)
MCV RBC AUTO: 95.8 FL — SIGNIFICANT CHANGE UP (ref 80–100)
METHADONE UR-MCNC: NEGATIVE — SIGNIFICANT CHANGE UP
MONOCYTES # BLD AUTO: 0.51 K/UL — SIGNIFICANT CHANGE UP (ref 0–0.9)
MONOCYTES NFR BLD AUTO: 14.4 % — HIGH (ref 2–14)
NEUTROPHILS # BLD AUTO: 1.62 K/UL — LOW (ref 1.8–7.4)
NEUTROPHILS NFR BLD AUTO: 45.6 % — SIGNIFICANT CHANGE UP (ref 43–77)
NITRITE UR-MCNC: NEGATIVE — SIGNIFICANT CHANGE UP
NRBC # BLD: 0 /100 WBCS — SIGNIFICANT CHANGE UP (ref 0–0)
OPIATES UR-MCNC: POSITIVE
PCP SPEC-MCNC: SIGNIFICANT CHANGE UP
PCP UR-MCNC: NEGATIVE — SIGNIFICANT CHANGE UP
PH UR: 6 — SIGNIFICANT CHANGE UP (ref 5–8)
PLATELET # BLD AUTO: 209 K/UL — SIGNIFICANT CHANGE UP (ref 150–400)
POTASSIUM SERPL-MCNC: 3.3 MMOL/L — LOW (ref 3.5–5.3)
POTASSIUM SERPL-SCNC: 3.3 MMOL/L — LOW (ref 3.5–5.3)
PROT SERPL-MCNC: 8.9 G/DL — HIGH (ref 6–8.3)
PROT UR-MCNC: 30 MG/DL
RBC # BLD: 5.23 M/UL — SIGNIFICANT CHANGE UP (ref 4.2–5.8)
RBC # FLD: 11.9 % — SIGNIFICANT CHANGE UP (ref 10.3–14.5)
SODIUM SERPL-SCNC: 138 MMOL/L — SIGNIFICANT CHANGE UP (ref 135–145)
SP GR SPEC: 1 — LOW (ref 1.01–1.02)
THC UR QL: NEGATIVE — SIGNIFICANT CHANGE UP
UROBILINOGEN FLD QL: 4
WBC # BLD: 3.55 K/UL — LOW (ref 3.8–10.5)
WBC # FLD AUTO: 3.55 K/UL — LOW (ref 3.8–10.5)

## 2019-08-07 PROCEDURE — 99285 EMERGENCY DEPT VISIT HI MDM: CPT

## 2019-08-07 PROCEDURE — 74177 CT ABD & PELVIS W/CONTRAST: CPT | Mod: 26

## 2019-08-07 PROCEDURE — 99223 1ST HOSP IP/OBS HIGH 75: CPT

## 2019-08-07 RX ORDER — HYDROMORPHONE HYDROCHLORIDE 2 MG/ML
0.5 INJECTION INTRAMUSCULAR; INTRAVENOUS; SUBCUTANEOUS ONCE
Refills: 0 | Status: DISCONTINUED | OUTPATIENT
Start: 2019-08-07 | End: 2019-08-07

## 2019-08-07 RX ORDER — KETOROLAC TROMETHAMINE 30 MG/ML
15 SYRINGE (ML) INJECTION EVERY 8 HOURS
Refills: 0 | Status: DISCONTINUED | OUTPATIENT
Start: 2019-08-07 | End: 2019-08-07

## 2019-08-07 RX ORDER — KETOROLAC TROMETHAMINE 30 MG/ML
30 SYRINGE (ML) INJECTION EVERY 6 HOURS
Refills: 0 | Status: DISCONTINUED | OUTPATIENT
Start: 2019-08-07 | End: 2019-08-09

## 2019-08-07 RX ORDER — HYDROMORPHONE HYDROCHLORIDE 2 MG/ML
1 INJECTION INTRAMUSCULAR; INTRAVENOUS; SUBCUTANEOUS EVERY 6 HOURS
Refills: 0 | Status: DISCONTINUED | OUTPATIENT
Start: 2019-08-07 | End: 2019-08-07

## 2019-08-07 RX ORDER — MORPHINE SULFATE 50 MG/1
4 CAPSULE, EXTENDED RELEASE ORAL ONCE
Refills: 0 | Status: DISCONTINUED | OUTPATIENT
Start: 2019-08-07 | End: 2019-08-07

## 2019-08-07 RX ORDER — ENOXAPARIN SODIUM 100 MG/ML
40 INJECTION SUBCUTANEOUS DAILY
Refills: 0 | Status: DISCONTINUED | OUTPATIENT
Start: 2019-08-07 | End: 2019-08-09

## 2019-08-07 RX ORDER — PANTOPRAZOLE SODIUM 20 MG/1
40 TABLET, DELAYED RELEASE ORAL DAILY
Refills: 0 | Status: DISCONTINUED | OUTPATIENT
Start: 2019-08-07 | End: 2019-08-09

## 2019-08-07 RX ORDER — SODIUM CHLORIDE 9 MG/ML
1000 INJECTION INTRAMUSCULAR; INTRAVENOUS; SUBCUTANEOUS ONCE
Refills: 0 | Status: COMPLETED | OUTPATIENT
Start: 2019-08-07 | End: 2019-08-07

## 2019-08-07 RX ORDER — HYDROMORPHONE HYDROCHLORIDE 2 MG/ML
0.5 INJECTION INTRAMUSCULAR; INTRAVENOUS; SUBCUTANEOUS THREE TIMES A DAY
Refills: 0 | Status: DISCONTINUED | OUTPATIENT
Start: 2019-08-07 | End: 2019-08-07

## 2019-08-07 RX ORDER — POTASSIUM CHLORIDE 20 MEQ
10 PACKET (EA) ORAL ONCE
Refills: 0 | Status: COMPLETED | OUTPATIENT
Start: 2019-08-07 | End: 2019-08-07

## 2019-08-07 RX ORDER — ONDANSETRON 8 MG/1
4 TABLET, FILM COATED ORAL ONCE
Refills: 0 | Status: COMPLETED | OUTPATIENT
Start: 2019-08-07 | End: 2019-08-07

## 2019-08-07 RX ORDER — SODIUM CHLORIDE 9 MG/ML
1000 INJECTION, SOLUTION INTRAVENOUS
Refills: 0 | Status: DISCONTINUED | OUTPATIENT
Start: 2019-08-07 | End: 2019-08-09

## 2019-08-07 RX ORDER — POTASSIUM CHLORIDE 20 MEQ
40 PACKET (EA) ORAL ONCE
Refills: 0 | Status: COMPLETED | OUTPATIENT
Start: 2019-08-07 | End: 2019-08-07

## 2019-08-07 RX ORDER — PANTOPRAZOLE SODIUM 20 MG/1
40 TABLET, DELAYED RELEASE ORAL ONCE
Refills: 0 | Status: COMPLETED | OUTPATIENT
Start: 2019-08-07 | End: 2019-08-07

## 2019-08-07 RX ORDER — ONDANSETRON 8 MG/1
4 TABLET, FILM COATED ORAL
Refills: 0 | Status: DISCONTINUED | OUTPATIENT
Start: 2019-08-07 | End: 2019-08-09

## 2019-08-07 RX ORDER — HYDROMORPHONE HYDROCHLORIDE 2 MG/ML
1 INJECTION INTRAMUSCULAR; INTRAVENOUS; SUBCUTANEOUS EVERY 4 HOURS
Refills: 0 | Status: DISCONTINUED | OUTPATIENT
Start: 2019-08-07 | End: 2019-08-09

## 2019-08-07 RX ORDER — TRAMADOL HYDROCHLORIDE 50 MG/1
25 TABLET ORAL EVERY 8 HOURS
Refills: 0 | Status: DISCONTINUED | OUTPATIENT
Start: 2019-08-07 | End: 2019-08-07

## 2019-08-07 RX ADMIN — PANTOPRAZOLE SODIUM 40 MILLIGRAM(S): 20 TABLET, DELAYED RELEASE ORAL at 13:01

## 2019-08-07 RX ADMIN — HYDROMORPHONE HYDROCHLORIDE 1 MILLIGRAM(S): 2 INJECTION INTRAMUSCULAR; INTRAVENOUS; SUBCUTANEOUS at 20:13

## 2019-08-07 RX ADMIN — HYDROMORPHONE HYDROCHLORIDE 1 MILLIGRAM(S): 2 INJECTION INTRAMUSCULAR; INTRAVENOUS; SUBCUTANEOUS at 23:43

## 2019-08-07 RX ADMIN — HYDROMORPHONE HYDROCHLORIDE 1 MILLIGRAM(S): 2 INJECTION INTRAMUSCULAR; INTRAVENOUS; SUBCUTANEOUS at 19:43

## 2019-08-07 RX ADMIN — MORPHINE SULFATE 4 MILLIGRAM(S): 50 CAPSULE, EXTENDED RELEASE ORAL at 03:17

## 2019-08-07 RX ADMIN — Medication 40 MILLIEQUIVALENT(S): at 09:24

## 2019-08-07 RX ADMIN — HYDROMORPHONE HYDROCHLORIDE 0.5 MILLIGRAM(S): 2 INJECTION INTRAMUSCULAR; INTRAVENOUS; SUBCUTANEOUS at 16:45

## 2019-08-07 RX ADMIN — HYDROMORPHONE HYDROCHLORIDE 0.5 MILLIGRAM(S): 2 INJECTION INTRAMUSCULAR; INTRAVENOUS; SUBCUTANEOUS at 08:04

## 2019-08-07 RX ADMIN — HYDROMORPHONE HYDROCHLORIDE 0.5 MILLIGRAM(S): 2 INJECTION INTRAMUSCULAR; INTRAVENOUS; SUBCUTANEOUS at 10:44

## 2019-08-07 RX ADMIN — SODIUM CHLORIDE 150 MILLILITER(S): 9 INJECTION, SOLUTION INTRAVENOUS at 09:26

## 2019-08-07 RX ADMIN — SODIUM CHLORIDE 1000 MILLILITER(S): 9 INJECTION INTRAMUSCULAR; INTRAVENOUS; SUBCUTANEOUS at 02:16

## 2019-08-07 RX ADMIN — MORPHINE SULFATE 4 MILLIGRAM(S): 50 CAPSULE, EXTENDED RELEASE ORAL at 04:25

## 2019-08-07 RX ADMIN — HYDROMORPHONE HYDROCHLORIDE 1 MILLIGRAM(S): 2 INJECTION INTRAMUSCULAR; INTRAVENOUS; SUBCUTANEOUS at 11:59

## 2019-08-07 RX ADMIN — ONDANSETRON 4 MILLIGRAM(S): 8 TABLET, FILM COATED ORAL at 02:17

## 2019-08-07 RX ADMIN — SODIUM CHLORIDE 1000 MILLILITER(S): 9 INJECTION INTRAMUSCULAR; INTRAVENOUS; SUBCUTANEOUS at 04:25

## 2019-08-07 RX ADMIN — HYDROMORPHONE HYDROCHLORIDE 0.5 MILLIGRAM(S): 2 INJECTION INTRAMUSCULAR; INTRAVENOUS; SUBCUTANEOUS at 09:32

## 2019-08-07 RX ADMIN — HYDROMORPHONE HYDROCHLORIDE 0.5 MILLIGRAM(S): 2 INJECTION INTRAMUSCULAR; INTRAVENOUS; SUBCUTANEOUS at 06:25

## 2019-08-07 RX ADMIN — HYDROMORPHONE HYDROCHLORIDE 0.5 MILLIGRAM(S): 2 INJECTION INTRAMUSCULAR; INTRAVENOUS; SUBCUTANEOUS at 16:30

## 2019-08-07 RX ADMIN — ONDANSETRON 4 MILLIGRAM(S): 8 TABLET, FILM COATED ORAL at 10:44

## 2019-08-07 RX ADMIN — HYDROMORPHONE HYDROCHLORIDE 1 MILLIGRAM(S): 2 INJECTION INTRAMUSCULAR; INTRAVENOUS; SUBCUTANEOUS at 15:37

## 2019-08-07 RX ADMIN — HYDROMORPHONE HYDROCHLORIDE 1 MILLIGRAM(S): 2 INJECTION INTRAMUSCULAR; INTRAVENOUS; SUBCUTANEOUS at 16:00

## 2019-08-07 RX ADMIN — HYDROMORPHONE HYDROCHLORIDE 1 MILLIGRAM(S): 2 INJECTION INTRAMUSCULAR; INTRAVENOUS; SUBCUTANEOUS at 12:45

## 2019-08-07 RX ADMIN — Medication 100 MILLIEQUIVALENT(S): at 06:25

## 2019-08-07 NOTE — ED PROVIDER NOTE - OBJECTIVE STATEMENT
28 y/o M patient with a significant PMHx of chronic pancreatitis and no significant PSHx presents to the ED with c/o upper abdominal pain that radiates to the back with vomiting. Patient reports the symptoms are similar to pancreatitis. Patient states that he was told that the pancreatitis due to blunt trauma in [place]. Patient denies any diarrhea, medications, surgeries or any other complains.

## 2019-08-07 NOTE — H&P ADULT - PROBLEM SELECTOR PLAN 2
pt presented with epigastric pain.  Found to have elevation in liver enzymes AST and ALT.  CT abdomen showed hepatic steatosis. a small hemangioma vs cyst also noted on exam.

## 2019-08-07 NOTE — H&P ADULT - PROBLEM SELECTOR PLAN 1
pt presented with epigastric pain radiating to back.  Lipase in WNL and no imaging evidence of pancreatitis.  Occasional alcohol, no gall stones.  Given multiple episodes of pancreatitis with similar presentation, patient is admitted for acute pancreatitis.  S/P 2l bolus in ED.  Started on IV hydration 150 ml/h.  Pain control with IV diluaded for severe pain.  IV toradol for moderate pain and  tramadol for mild pain.  IV zofran for nausea and vomiting.  Monitor for clinical improvement.  NPO for now.

## 2019-08-07 NOTE — H&P ADULT - ATTENDING COMMENTS
This is a 29 year old man with PMH of ? recurrent pancreatitis / abdominal pain presenting with another episode of abdominal pain which started overnight at work, sharp, radiating to the back. There was no nausea or vomiting or diarrhea but states that for the past 2 days he has had dark stools. No dizziness, CP or LOC.No fevers  This is typically his mode of presentation. He has had about 5 episodes in the last 15 months. He was diagnosed with pancreatitis and was said to be trauma related at the time. He has since quit smoking and only drinks alcohol occasionally - last drink was 2 days ago.  He has had extensive work up at [facility] including multiple imaging studies with US/CT and MRI of the abd/pelvis.  Upper endoscopy and EUS with biopsy with no significant findings.  He was brought here from work ( works with [employer] system.    Vital Signs Last 24 Hrs  T(C): 36.7 (07 Aug 2019 07:20), Max: 36.7 (07 Aug 2019 07:20)  T(F): 98.1 (07 Aug 2019 07:20), Max: 98.1 (07 Aug 2019 07:20)  HR: 65 (07 Aug 2019 07:20) (65 - 86)  BP: 147/95 (07 Aug 2019 07:20) (116/84 - 147/95)  RR: 19 (07 Aug 2019 07:20) (19 - 19)  SpO2: 100% (07 Aug 2019 07:20) (98% - 100%)    young man, in moderate distress from abdominal pain, AAO X 3  CTA B/L; RRR S1S2 only  guarding and avoiding exam; rectal exam deferred  No pedal edema    Labs                        17.2   3.55  )-----------( 209      ( 07 Aug 2019 02:39 )             50.1     08-07    138  |  99  |  6<L>  ----------------------------<  96  3.3<L>   |  27  |  1.21    Ca    9.5      07 Aug 2019 02:39    TPro  8.9<H>  /  Alb  4.9  /  TBili  0.9  /  DBili  x   /  AST  323<H>  /  ALT  312<H>  /  AlkPhos  75  08-07    UA - unremarkable    CT a/p noted    Impression  Young man with episodes of severe recurrent  abdominal pain diagnosed as acute pancreatitis and associated bloody /dark tarry stool suggestive of UGI bleed in [facility] with extensive work up unrevealing here with another episode.  Work up here does not show elevated lipase or imaging changes but elevated transaminases    A/P  30 y/o man with hx as above presenting with acute abdominal pain suggestive of his prior pancreatitis.    Would admit for conservative management  NPO / IVF / pain control  - on opioids now / antiemetics  Check FOBT / GI consult / serial H/H  Obtain records from [facility]   Monitor

## 2019-08-07 NOTE — ED PROVIDER NOTE - CLINICAL SUMMARY MEDICAL DECISION MAKING FREE TEXT BOX
28 y/o F patient with history of pancreatitis, now currently with epigastric pain. Will do labs, CT abdomen/pelvis, morphine for pain and reassess. 28 y/o F patient with history of pancreatitis, now currently with epigastric pain. Will do labs, CT abdomen/pelvis, morphine for pain and reassess.    labs show H/H 17-50-given IVF, lipase 350 wnl, AST/ALT 300s, UA shows moderate bacteria but patient denies uti symptoms  CT shows No CT evidence of pancreatitis. Mild hepatic steatosis. Low-attenuation lesions in the right hepatic lobe adjacent to the gallbladder fossa measuring up to 2.2 cm which could represent cysts or hemangiomas.   Discussed above with patient. On reeval patient reports persistent severe abd pain despite morphine x2 doses. Now also reporting that pain associated with rectal bleeding which is similar to episodes in past. Patient is willing to give stool sample for testing. patient reports he has had endoscopy done in past without source of bleeding found but has not had a colonoscopy. Patient agrees with plan for admission for further evaluation and GI consultation.

## 2019-08-07 NOTE — H&P ADULT - NSHPPHYSICALEXAM_GEN_ALL_CORE
Vital Signs Last 24 Hrs  T(C): 36.7 (07 Aug 2019 07:20), Max: 36.7 (07 Aug 2019 07:20)  T(F): 98.1 (07 Aug 2019 07:20), Max: 98.1 (07 Aug 2019 07:20)  HR: 65 (07 Aug 2019 07:20) (65 - 86)  BP: 147/95 (07 Aug 2019 07:20) (116/84 - 147/95)  BP(mean): --  RR: 19 (07 Aug 2019 07:20) (19 - 19)  SpO2: 100% (07 Aug 2019 07:20) (98% - 100%)

## 2019-08-07 NOTE — ED ADULT NURSE NOTE - OBJECTIVE STATEMENT
The patient has h/o pancreatitis and presents with LUQ pain radiating to his back with nausea and vomiting for 2 hours.

## 2019-08-07 NOTE — H&P ADULT - HISTORY OF PRESENT ILLNESS
27 years old male with PMHx of 5 episodes of acute pancreatitis in last 15 months presented with severe abdominal pain. Patient pain started suddenly, 10/10 in intensity, starting at epigastrium and radiating straight to back. Patient thinks that her pain is similar to his previous episodes of pancreatitis Patient last episode was owed to trauma. patient only drink alcohol occasionally he had extensive workup done including biopsy of pancreatitic tissue but cause of pancreatitis remained unclear.  patient denies any chest pain, palpitations, shortness of breath, fever, chills, change in urinary or bowel habits

## 2019-08-07 NOTE — H&P ADULT - PROBLEM SELECTOR PLAN 3
IMPROVE VTE Individual Risk Assessment  RISK                                                          Points  [] Previous VTE                                           3  [] Thrombophilia                                        2  [] Lower limb paralysis                              2   [] Current Cancer                                       2   [] Immobilization > 24 hrs                        1  [] ICU/CCU stay > 24 hours                       1  [] Age > 60                                                   1  IMPROVE VTE Score = 1  lovenox 40 mg for DVT chemoprophylaxis

## 2019-08-08 DIAGNOSIS — R10.9 UNSPECIFIED ABDOMINAL PAIN: ICD-10-CM

## 2019-08-08 DIAGNOSIS — I10 ESSENTIAL (PRIMARY) HYPERTENSION: ICD-10-CM

## 2019-08-08 LAB
ALBUMIN SERPL ELPH-MCNC: 3.9 G/DL — SIGNIFICANT CHANGE UP (ref 3.5–5)
ALP SERPL-CCNC: 63 U/L — SIGNIFICANT CHANGE UP (ref 40–120)
ALT FLD-CCNC: 213 U/L DA — HIGH (ref 10–60)
ANION GAP SERPL CALC-SCNC: 8 MMOL/L — SIGNIFICANT CHANGE UP (ref 5–17)
AST SERPL-CCNC: 159 U/L — HIGH (ref 10–40)
BILIRUB DIRECT SERPL-MCNC: 0.4 MG/DL — HIGH (ref 0–0.2)
BILIRUB INDIRECT FLD-MCNC: 1.7 MG/DL — HIGH (ref 0.2–1)
BILIRUB SERPL-MCNC: 2.1 MG/DL — HIGH (ref 0.2–1.2)
BUN SERPL-MCNC: 4 MG/DL — LOW (ref 7–18)
CALCIUM SERPL-MCNC: 9.3 MG/DL — SIGNIFICANT CHANGE UP (ref 8.4–10.5)
CHLORIDE SERPL-SCNC: 97 MMOL/L — SIGNIFICANT CHANGE UP (ref 96–108)
CHOLEST SERPL-MCNC: 189 MG/DL — SIGNIFICANT CHANGE UP (ref 10–199)
CO2 SERPL-SCNC: 32 MMOL/L — HIGH (ref 22–31)
CREAT SERPL-MCNC: 0.84 MG/DL — SIGNIFICANT CHANGE UP (ref 0.5–1.3)
CULTURE RESULTS: SIGNIFICANT CHANGE UP
ERYTHROCYTE [SEDIMENTATION RATE] IN BLOOD: 2 MM/HR — SIGNIFICANT CHANGE UP (ref 0–15)
GLUCOSE SERPL-MCNC: 69 MG/DL — LOW (ref 70–99)
HBA1C BLD-MCNC: 4.8 % — SIGNIFICANT CHANGE UP (ref 4–5.6)
HCT VFR BLD CALC: 45.8 % — SIGNIFICANT CHANGE UP (ref 39–50)
HDLC SERPL-MCNC: 67 MG/DL — SIGNIFICANT CHANGE UP
HGB BLD-MCNC: 15.5 G/DL — SIGNIFICANT CHANGE UP (ref 13–17)
LIDOCAIN IGE QN: 125 U/L — SIGNIFICANT CHANGE UP (ref 73–393)
LIPID PNL WITH DIRECT LDL SERPL: 101 MG/DL — SIGNIFICANT CHANGE UP
MAGNESIUM SERPL-MCNC: 1.6 MG/DL — SIGNIFICANT CHANGE UP (ref 1.6–2.6)
MCHC RBC-ENTMCNC: 33 PG — SIGNIFICANT CHANGE UP (ref 27–34)
MCHC RBC-ENTMCNC: 33.8 GM/DL — SIGNIFICANT CHANGE UP (ref 32–36)
MCV RBC AUTO: 97.4 FL — SIGNIFICANT CHANGE UP (ref 80–100)
NRBC # BLD: 0 /100 WBCS — SIGNIFICANT CHANGE UP (ref 0–0)
PLATELET # BLD AUTO: 153 K/UL — SIGNIFICANT CHANGE UP (ref 150–400)
POTASSIUM SERPL-MCNC: 4.3 MMOL/L — SIGNIFICANT CHANGE UP (ref 3.5–5.3)
POTASSIUM SERPL-SCNC: 4.3 MMOL/L — SIGNIFICANT CHANGE UP (ref 3.5–5.3)
PROT SERPL-MCNC: 7.4 G/DL — SIGNIFICANT CHANGE UP (ref 6–8.3)
RBC # BLD: 4.7 M/UL — SIGNIFICANT CHANGE UP (ref 4.2–5.8)
RBC # FLD: 11.9 % — SIGNIFICANT CHANGE UP (ref 10.3–14.5)
SODIUM SERPL-SCNC: 137 MMOL/L — SIGNIFICANT CHANGE UP (ref 135–145)
SPECIMEN SOURCE: SIGNIFICANT CHANGE UP
TOTAL CHOLESTEROL/HDL RATIO MEASUREMENT: 2.8 RATIO — LOW (ref 3.4–9.6)
TRIGL SERPL-MCNC: 104 MG/DL — SIGNIFICANT CHANGE UP (ref 10–149)
TSH SERPL-MCNC: 1.18 UU/ML — SIGNIFICANT CHANGE UP (ref 0.34–4.82)
VIT B12 SERPL-MCNC: 745 PG/ML — SIGNIFICANT CHANGE UP (ref 232–1245)
WBC # BLD: 3.45 K/UL — LOW (ref 3.8–10.5)
WBC # FLD AUTO: 3.45 K/UL — LOW (ref 3.8–10.5)

## 2019-08-08 PROCEDURE — 76705 ECHO EXAM OF ABDOMEN: CPT | Mod: 26

## 2019-08-08 PROCEDURE — 99233 SBSQ HOSP IP/OBS HIGH 50: CPT | Mod: GC

## 2019-08-08 RX ORDER — SODIUM CHLORIDE 9 MG/ML
1000 INJECTION, SOLUTION INTRAVENOUS
Refills: 0 | Status: DISCONTINUED | OUTPATIENT
Start: 2019-08-08 | End: 2019-08-09

## 2019-08-08 RX ORDER — LOSARTAN POTASSIUM 100 MG/1
25 TABLET, FILM COATED ORAL DAILY
Refills: 0 | Status: DISCONTINUED | OUTPATIENT
Start: 2019-08-08 | End: 2019-08-09

## 2019-08-08 RX ADMIN — Medication 30 MILLIGRAM(S): at 22:57

## 2019-08-08 RX ADMIN — SODIUM CHLORIDE 150 MILLILITER(S): 9 INJECTION, SOLUTION INTRAVENOUS at 08:47

## 2019-08-08 RX ADMIN — ENOXAPARIN SODIUM 40 MILLIGRAM(S): 100 INJECTION SUBCUTANEOUS at 12:10

## 2019-08-08 RX ADMIN — HYDROMORPHONE HYDROCHLORIDE 1 MILLIGRAM(S): 2 INJECTION INTRAMUSCULAR; INTRAVENOUS; SUBCUTANEOUS at 08:04

## 2019-08-08 RX ADMIN — PANTOPRAZOLE SODIUM 40 MILLIGRAM(S): 20 TABLET, DELAYED RELEASE ORAL at 12:09

## 2019-08-08 RX ADMIN — HYDROMORPHONE HYDROCHLORIDE 1 MILLIGRAM(S): 2 INJECTION INTRAMUSCULAR; INTRAVENOUS; SUBCUTANEOUS at 20:14

## 2019-08-08 RX ADMIN — HYDROMORPHONE HYDROCHLORIDE 1 MILLIGRAM(S): 2 INJECTION INTRAMUSCULAR; INTRAVENOUS; SUBCUTANEOUS at 16:11

## 2019-08-08 RX ADMIN — HYDROMORPHONE HYDROCHLORIDE 1 MILLIGRAM(S): 2 INJECTION INTRAMUSCULAR; INTRAVENOUS; SUBCUTANEOUS at 12:24

## 2019-08-08 RX ADMIN — Medication 30 MILLIGRAM(S): at 22:27

## 2019-08-08 RX ADMIN — HYDROMORPHONE HYDROCHLORIDE 1 MILLIGRAM(S): 2 INJECTION INTRAMUSCULAR; INTRAVENOUS; SUBCUTANEOUS at 12:09

## 2019-08-08 RX ADMIN — HYDROMORPHONE HYDROCHLORIDE 1 MILLIGRAM(S): 2 INJECTION INTRAMUSCULAR; INTRAVENOUS; SUBCUTANEOUS at 08:19

## 2019-08-08 RX ADMIN — LOSARTAN POTASSIUM 25 MILLIGRAM(S): 100 TABLET, FILM COATED ORAL at 10:33

## 2019-08-08 RX ADMIN — HYDROMORPHONE HYDROCHLORIDE 1 MILLIGRAM(S): 2 INJECTION INTRAMUSCULAR; INTRAVENOUS; SUBCUTANEOUS at 16:26

## 2019-08-08 RX ADMIN — HYDROMORPHONE HYDROCHLORIDE 1 MILLIGRAM(S): 2 INJECTION INTRAMUSCULAR; INTRAVENOUS; SUBCUTANEOUS at 04:01

## 2019-08-08 RX ADMIN — HYDROMORPHONE HYDROCHLORIDE 1 MILLIGRAM(S): 2 INJECTION INTRAMUSCULAR; INTRAVENOUS; SUBCUTANEOUS at 04:16

## 2019-08-08 RX ADMIN — HYDROMORPHONE HYDROCHLORIDE 1 MILLIGRAM(S): 2 INJECTION INTRAMUSCULAR; INTRAVENOUS; SUBCUTANEOUS at 20:47

## 2019-08-08 RX ADMIN — HYDROMORPHONE HYDROCHLORIDE 1 MILLIGRAM(S): 2 INJECTION INTRAMUSCULAR; INTRAVENOUS; SUBCUTANEOUS at 00:13

## 2019-08-08 NOTE — CHART NOTE - NSCHARTNOTEFT_GEN_A_CORE
Patient reports having had an EGD at [facility] in 2018. Contacted medical records office at [facility]. Staff unable to find any records of GI workup in recent years at their facility.

## 2019-08-08 NOTE — DISCHARGE NOTE PROVIDER - HOSPITAL COURSE
27 years old male with PMHx of 5 episodes of acute pancreatitis in last 15 months presented with severe abdominal pain. Patient pain started suddenly, 10/10 in intensity, starting at epigastrium and radiating straight to back. Patient thinks that her pain is similar to his previous episodes of pancreatitis Patient last episode was owed to trauma. patient only drink alcohol occasionally he had extensive workup done including biopsy of pancreatitic tissue but cause of pancreatitis remained unclear.    patient denies any chest pain, palpitations, shortness of breath, fever, chills, change in urinary or bowel habits        Admitted for intractable abdominal pain, initially thought to be acute pancreatitis given hx. CT abdomen, lipase and triglycerides unremarkable. Pain of unclear etiology. GI consulted. 27 years old male with PMHx of 5 episodes of acute pancreatitis in last 15 months presented with severe abdominal pain. Patient pain started suddenly, 10/10 in intensity, starting at epigastrium and radiating straight to back. Patient thinks that her pain is similar to his previous episodes of pancreatitis Patient last episode was owed to trauma. patient only drink alcohol occasionally he had extensive workup done including biopsy of pancreatitic tissue but cause of pancreatitis remained unclear.    patient denies any chest pain, palpitations, shortness of breath, fever, chills, change in urinary or bowel habits        Admitted for intractable abdominal pain, initially thought to be acute pancreatitis given hx. CT abdomen, lipase and triglycerides unremarkable. Pain of unclear etiology. GI consulted. MRCP performed.

## 2019-08-08 NOTE — PROGRESS NOTE ADULT - PROBLEM SELECTOR PLAN 1
CT abdomen unremarkable and lipase negative  triglycerides wnl  acute pancreatitis unlikely  pain mgt eval noted  c/w dilaudid prn for pain  c/w zofran for nausea  c/w IV fluids  diet advanced to full liquid this AM - will advance to soft by noon if tolerating  f/u occult blood  GI eval pending

## 2019-08-08 NOTE — DISCHARGE NOTE PROVIDER - NSDCCPCAREPLAN_GEN_ALL_CORE_FT
PRINCIPAL DISCHARGE DIAGNOSIS  Diagnosis: Intractable abdominal pain  Assessment and Plan of Treatment: You were admitted to the hospital with intractable abdominal pain that was initially suspected to be acute pancreatitis. However, given the findings on your CT scan, your serum lipase level and your normal triglycerides, pancreatitis is unlikely. We advise you to follow up with our gastroenterologist outpatient for this recurrent problem. if you cannot follow up with our gastroenterologist [place], we strongly advise you to return to seeing your outpatient GI doctor that you saw in the past. PRINCIPAL DISCHARGE DIAGNOSIS  Diagnosis: Intractable abdominal pain  Assessment and Plan of Treatment: You were admitted to the hospital with intractable abdominal pain that was initially suspected to be acute pancreatitis. However, given the findings on your CT scan, your serum lipase level and your normal triglycerides, pancreatitis is unlikely. An MRCP performed inpatient was normal. We advise you to follow up with our gastroenterologist outpatient for this recurrent problem. if you cannot follow up with our gastroenterologist as you are from [place] of state, we strongly advise you to return to seeing your outpatient GI doctor that you saw in the past.      SECONDARY DISCHARGE DIAGNOSES  Diagnosis: HTN (hypertension)  Assessment and Plan of Treatment: You were found to have high blood pressure in the hospital. We started you on a medication called Cozaar. Continue this medication at home and follow up with your primary doctor for continued blood pressure monitoring.

## 2019-08-08 NOTE — DISCHARGE NOTE PROVIDER - CARE PROVIDER_API CALL
Wily Caal)  Gastroenterology; Internal Medicine  [address] [place] [id number]  Phone: [phone]  Fax: [phone]  Follow Up Time:

## 2019-08-08 NOTE — CONSULT NOTE ADULT - ASSESSMENT
Welcome Javier Butler  ×   Update Personal Info   FAQ   Search Results Help   Help  ×Practitioners that need to update their address with the Official Prescription Program should send a copy of the updated ARIC certificate to [facility] at [email or web address] or by fax at [phone]. (Pharmacies can ignore this message.)   Patient Search   Multi-Patient Search   Reports   Drug Listing   Designation   My ARIC Numbers  Data Detail Level: Printer-Friendly View  Extended View    Other State Confidential Drug Utilization Report  Search Terms: shayna rainey, 1990 Search Date: 08/07/2019 02:56:38 PM States Searched: [place]  The Drug Utilization Report below displays the controlled substance prescriptions, if any, that were dispensed in the indicated state(s). The information displayed on this report is compiled from requests submitted to other states' PMPs, and accurately reflects the information as returned by them. Blank fields indicate data not provided by other state.    This report was requested by: Javier Butler | Reference #: [id number]    Prescriptions Dispensed in [place]  Others' Prescriptions  Patient Name:	SHAYNA RAINEY	YOB: 1990  Address:	[address] [address]	Sex:	Male  Rx Written	Rx Dispensed	Drug	Strength	Quantity	Days Supply	Prescriber Name  09/05/2017	09/05/2017	OXYCODONE HCL 5 MG TABLET		60.0	30	SONYA JOSEPH KARTHIK  10/05/2017	10/05/2017	OXYCODONE HCL 5 MG TABLET		60.0	30	[facility]  10/05/2017	10/05/2017	TRAMADOL HCL 50 MG TABLET		60.0	30	[facility]FFAIRS, DEPT  11/14/2017	11/14/2017	OXYCODONE HCL 5 MG TABLET		60.0	30	AFFAIRS, DEPT  12/12/2017	12/12/2017	OXYCODONE HCL 5 MG TABLET		56.0	28	AFFAIRS, DEPT  04/09/2018	04/09/2018	OXYCODONE HCL 5 MG TABLET		30.0	30	MD GUY JEREMY  09/24/2018	09/24/2018	OXYCODONE-ACETAMINOPHEN 5-325		120.0	30	MD GUY JEREMY  07/10/2018	07/10/2018	OXYCODONE-ACETAMINOPHEN 5-325		15.0	5	AFFAIRS, DEPT  03/15/2018	03/15/2018	OXYCODONE HCL 5 MG TABLET		40.0	20	MD MALENA, DUC  02/09/2018	02/09/2018	OXYCODONE HCL 5 MG TABLET		56.0	28	AFFAIRS, DEPT  01/12/2018	01/12/2018	OXYCODONE HCL 5 MG TABLET		56.0	28	AFFAIRS, DEPT  * - Drugs marked with an asterisk are compound drugs. If the compound drug is made up of more than one controlled substance, then each controlled
29 year old male presents with recurrent abdominal pain.    I reviewed the images and labs. I don't believe the patient has acute pancreatitis Part of the differential includes Sphincter of oddi dysfunction     Plan:  Repeat MRCP there may be a stone that was missed on the previous MRCP   A HIDA scan can showed delayed bile clearance for SOD I. Consider HIDA scan before discharge as well.   If that is not conclusive I would refer him for an outpatient EUS and possible a sphincterotomy.

## 2019-08-08 NOTE — DISCHARGE NOTE PROVIDER - NSDCPNSUBOBJ_GEN_ALL_CORE
MEDICAL ATTENDING DISCHARGE NOTE :        Patient is a 29y old  Male who presents with a chief complaint of abdominal pain and nausea (09 Aug 2019 12:36)            INTERVAL HPI / OVERNIGHT EVENTS: patient with uneventful night and offers no new complaints        ----------------------------------------------------------------------------------    REVIEW OF SYSTEMS: no fever; no SOB            Vital Signs Last 24 Hrs    T(C): 36.8 (09 Aug 2019 18:15), Max: 37.1 (08 Aug 2019 20:12)    T(F): 98.3 (09 Aug 2019 18:15), Max: 98.7 (08 Aug 2019 20:12)    HR: 74 (09 Aug 2019 18:15) (59 - 79)    BP: 138/96 (09 Aug 2019 18:15) (122/80 - 146/95)    BP(mean): --    RR: 16 (09 Aug 2019 18:15) (16 - 18)    SpO2: 99% (09 Aug 2019 18:15) (98% - 100%)        _________________    PHYSICAL EXAM:    ---------------------------     NAD; Normocephalic    LUNGS - no wheezing    HEART: S1 S2+     ABDOMEN: Soft, Nontender, non distended    EXTREMITIES: no cyanosis; no edema            _________________________________________________    LABS:                            15.4     2.75  )-----------( 132      ( 09 Aug 2019 07:21 )               46.2         08-09        139  |  102  |  6<L>    ----------------------------<  81    4.1   |  33<H>  |  0.89        Ca    9.3      09 Aug 2019 07:21    Mg     1.6     08-08        TPro  7.1  /  Alb  3.6  /  TBili  0.9  /  DBili  0.2  /  AST  88<H>  /  ALT  157<H>  /  AlkPhos  59  08-09        A/P: Abdominal pain    - acute pancreatitis ruled out.    Mild elevation in liver enzymes likely due to ETOH    No evidence to suggest gallstones as cause of abdominal pain.    concern for opioid use disorder.    Patient given copy of MRI for his PCP and GI     He was strongly advised to follow up outpatient with GI    No medical contraindication to returning to work- he requested for a letter for additional days off work but based     on my medical assessment and his current clinical presentation, he is stable to return to work.    I have advised him to follow up with his PCP and primary GI for conitnued care and follow up     No indication for opioids on discharge.            Plan of care, test results and findings were  discussed with patient ( and HCP &/or primary care giver) ; all questions and concerns were addressed and care was aligned with patient's wishes.    PMD follow up after discharge from the hospital for continued care and outpatient monitoring was advised.    Discharge plans has been  discussed with care team including the house staff, nursing staff  and discharge planners- ( /  .) MEDICAL ATTENDING DISCHARGE NOTE :        Patient is a 29y old  Male who presents with a chief complaint of abdominal pain and nausea (09 Aug 2019 12:36)            INTERVAL HPI / OVERNIGHT EVENTS: patient with uneventful night and offers no new complaints        ----------------------------------------------------------------------------------    REVIEW OF SYSTEMS: no fever; no SOB            Vital Signs Last 24 Hrs    T(C): 36.8 (09 Aug 2019 18:15), Max: 37.1 (08 Aug 2019 20:12)    T(F): 98.3 (09 Aug 2019 18:15), Max: 98.7 (08 Aug 2019 20:12)    HR: 74 (09 Aug 2019 18:15) (59 - 79)    BP: 138/96 (09 Aug 2019 18:15) (122/80 - 146/95)    BP(mean): --    RR: 16 (09 Aug 2019 18:15) (16 - 18)    SpO2: 99% (09 Aug 2019 18:15) (98% - 100%)        _________________    PHYSICAL EXAM:    ---------------------------     NAD; Normocephalic    LUNGS - no wheezing    HEART: S1 S2+     ABDOMEN: Soft, Nontender, non distended    EXTREMITIES: no cyanosis; no edema            _________________________________________________    LABS:                            15.4     2.75  )-----------( 132      ( 09 Aug 2019 07:21 )               46.2         08-09        139  |  102  |  6<L>    ----------------------------<  81    4.1   |  33<H>  |  0.89        Ca    9.3      09 Aug 2019 07:21    Mg     1.6     08-08        TPro  7.1  /  Alb  3.6  /  TBili  0.9  /  DBili  0.2  /  AST  88<H>  /  ALT  157<H>  /  AlkPhos  59  08-09            < from: MR MRCP No Cont (08.09.19 @ 16:13) >        Findings: There is a 2.2 cm focal fatty infiltration in hepatic segment 5     adjacent to the gallbladder fossa, corresponding to the hypodense lesion     found on abdominal CT dated 8/7/2019. There is another 1.4 cm focal fatty     infiltration inhepatic segment 4 adjacent to the gallbladder fossa. No     evidence for a hepatic mass.        The gallbladder appears unremarkable without evidence for gallstone,     thickened gallbladder wall or pericholecystic fluid. The common bile duct     measures 4 mmin caliber which is within normal limits. No suspicious     filling defect in the common bile duct to suggest choledocholithiasis.     The main pancreatic duct maintains normal caliber without dilatation.        The pancreas, spleen, adrenals and kidneys appear unremarkable.        No evidence for ascites, or enlarged lymph node.        Impression: 2 small areas of focal fatty infiltration in the liver. No     evidence for a hepatic mass.        No evidence for cholelithiasis, acute cholecystitis or     choledocholithiasis. The common bile duct is not dilated.        < end of copied text >                A/P: Abdominal pain and fatty liver likey alcohol induced    - acute pancreatitis ruled out.    Mild elevation in liver enzymes likely due to ETOH    No evidence to suggest gallstones as cause of abdominal pain.    concern for opioid use disorder.    Patient given copy of MRI for his PCP and GI     He was strongly advised to follow up outpatient with GI    No medical contraindication to returning to work- he requested for a letter for additional days off work but based     on my medical assessment and his current clinical presentation, he is stable to return to work.    I have advised him to follow up with his PCP and primary GI for conitnued care and follow up     No indication for opioids on discharge.            Plan of care, test results and findings were  discussed with patient ( and HCP &/or primary care giver) ; all questions and concerns were addressed and care was aligned with patient's wishes.    PMD follow up after discharge from the hospital for continued care and outpatient monitoring was advised.    Discharge plans has been  discussed with care team including the house staff, nursing staff  and discharge planners- ( /  .)

## 2019-08-08 NOTE — PROGRESS NOTE ADULT - ATTENDING COMMENTS
Patient was seen and examined by myself. Case was discussed with house staff in details. I have reviewed and agree with the plan as outlined above with edits where appropriate.    Vital Signs Last 24 Hrs  T(C): 37.1 (08 Aug 2019 20:12), Max: 37.1 (08 Aug 2019 20:12)  T(F): 98.7 (08 Aug 2019 20:12), Max: 98.7 (08 Aug 2019 20:12)  HR: 71 (08 Aug 2019 20:12) (53 - 71)  BP: 146/95 (08 Aug 2019 20:12) (146/95 - 153/99)  BP(mean): --  RR: 18 (08 Aug 2019 20:12) (18 - 18)  SpO2: 98% (08 Aug 2019 20:12) (98% - 100%)    Bilirubin Total, Serum: 2.1 mg/dL (08.08.19 @ 07:50)      08-08    137  |  97  |  4<L>  ----------------------------<  69<L>  4.3   |  32<H>  |  0.84    Ca    9.3      08 Aug 2019 07:50  Mg     1.6     08-08    TPro  7.4  /  Alb  3.9  /  TBili  2.1<H>  /  DBili  0.4<H>  /  AST  159<H>  /  ALT  213<H>  /  AlkPhos  63  08-08    A/P: 30 y/o M with   1. severe abdominal pain presumed due to biliary tract disease requiring IV/PO narcotics; unlikely pancreatitis  2. Dilated billary duct  3. elevated bilirubin  4. Concern for opioid use disorder    seen by GI  Await MRCP  liquid diet now; advance slowly as tolerated  Limit use of IV opioids if feasible.

## 2019-08-08 NOTE — CONSULT NOTE ADULT - SUBJECTIVE AND OBJECTIVE BOX
Chief Complaint:    HPI:  27 years old male with PMHx of 5 episodes of acute pancreatitis in last 15 months presented with severe abdominal pain. Patient pain started suddenly, 10/10 in intensity, starting at epigastrium and radiating straight to back. Patient thinks that her pain is similar to his previous episodes of pancreatitis Patient last episode was owed to trauma. patient only drink alcohol occasionally he had extensive workup done including biopsy of pancreatitic tissue but cause of pancreatitis remained unclear.  patient denies any chest pain, palpitations, shortness of breath, fever, chills, change in urinary or bowel habits (07 Aug 2019 07:38)      Pain Level:   Scale: VAS    PAST MEDICAL & SURGICAL HISTORY:  Pancreatitis      FAMILY HISTORY:      SOCIAL HISTORY:  [ ] Denies Smoking, Alcohol, or Drug Use    Allergies    No Known Allergies    Intolerances        PAIN MEDICATIONS:  HYDROmorphone  Injectable 1 milliGRAM(s) IV Push every 4 hours PRN  ketorolac   Injectable 30 milliGRAM(s) IV Push every 6 hours PRN  ondansetron Injectable 4 milliGRAM(s) IV Push four times a day PRN      Heme:  enoxaparin Injectable 40 milliGRAM(s) SubCutaneous daily    Antibiotics:    Cardiovascular:    GI:  pantoprazole  Injectable 40 milliGRAM(s) IV Push daily    Endocrine:    All Other Medications:  lactated ringers. 1000 milliLiter(s) IV Continuous <Continuous>      REVIEW OF SYSTEMS:    CONSTITUTIONAL: No fever, weight loss, or fatigue  RESPIRATORY: No cough, wheezing, chills, or hemoptysis, No SOB  NECK: No neck pain  CARDIOVASCULAR: No chest pain, palpitations, dizziness, or leg swelling  GASTROINTESTINAL: No abdominal or epigastric pain.  No nausea, vomiting, or hematemesis.  No diarrhea. + constipation.  GENITOURINARY: No dysuria, frequency, hematuria or incontinence  NEUROLOGICAL: No headaches, memory loss, loss of strength, numbness or tremors  MUSCULOSKELETAL: No joint pain or swelling, No muscle or back pain    Vital Signs Last 24 Hrs  T(C): 36.9 (07 Aug 2019 14:08), Max: 36.9 (07 Aug 2019 14:08)  T(F): 98.4 (07 Aug 2019 14:08), Max: 98.4 (07 Aug 2019 14:08)  HR: 56 (07 Aug 2019 14:08) (56 - 86)  BP: 155/94 (07 Aug 2019 14:08) (116/84 - 155/94)  BP(mean): --  RR: 18 (07 Aug 2019 14:08) (18 - 19)  SpO2: 98% (07 Aug 2019 14:08) (98% - 100%)    PAIN SCORE:         SCALE USED: (1-10 VNRS)    PHYSICAL EXAM:    GENERAL: NAD, well-groomed, well-developed   NERVOUS SYSTEM: Alert and oriented x3, Motor strength 5/5 B/L upper and lower extremities, SLR -   CHEST/LUNG: Clear to percussion bilaterally, no rale, rhonchi or wheezing  HEART: Regular rate and rhythm, No murmurs, rubs, or gallops   ABDOMEN: Soft, nontender, nondistended, Bowel sounds present  BACK: No CVA tenderness, No paravetebral tenderness  EXTREMITIES: +2 periperal extremities, no edema, cyanosis + decreased rom     LABS:                          17.2   3.55  )-----------( 209      ( 07 Aug 2019 02:39 )             50.1     08    138  |  99  |  6<L>  ----------------------------<  96  3.3<L>   |  27  |  1.21    Ca    9.5      07 Aug 2019 02:39    TPro  8.9<H>  /  Alb  4.9  /  TBili  0.9  /  DBili  x   /  AST  323<H>  /  ALT  312<H>  /  AlkPhos  75  08-07      Urinalysis Basic - ( 07 Aug 2019 04:23 )    Color: Yellow / Appearance: Clear / S.005 / pH: x  Gluc: x / Ketone: Small  / Bili: Negative / Urobili: 4   Blood: x / Protein: 30 mg/dL / Nitrite: Negative   Leuk Esterase: Negative / RBC: 0-2 /HPF / WBC 0-2 /HPF   Sq Epi: x / Non Sq Epi: Few /HPF / Bacteria: Moderate /HPF        RADIOLOGY:    Drug Screen:        RECOMMENDATIONS    [ ]  [place]  Reviewed and Copied to Chart
Patient is a 29y old  Male who presents with a chief complaint of abdominal pain and nausea (08 Aug 2019 14:13)    Mr. Mo is a 29 year old male presents to ED with recurrent abdominal pain. he states the pain has been diffuse , burning 10/10 with radiation ot the back.  Occasional ETOh use. States he had multiple workups including colon/ EGD MRI etc. wiht no etiology.           REVIEW OF SYSTEMS  Constitutional:   No fever, no fatigue, no pallor, no night sweats, no weight loss.  HEENT:   No eye pain, no vision changes, no icterus, no mouth ulcers.  Respiratory:   No shortness of breath, no cough, no respiratory distress.   Cardiovascular:   No chest pain, no palpitations.   Gastrointestinal: + abdominal pain, no nausea, no vomiting , no diarrhea no constipation, no hematochezia, no melena.  Skin:   No rashes, no jaundice, no eczema.   Musculoskeletal:   No joint pain, no swelling, no myalgia.   Neurologic:   No headache, no seizure, no weakness.   Genitourinary:   No dysuria, no decreased urine output.  Psychiatric:  No depression, no anxiety,   Endocrine:   No thyroid disease, no diabetes.  Heme/Lymphatic:   No anemia, no blood transfusions, no lymph node enlargement, no bleeding, no bruising.  ___________________________________________________________________________________________  Allergies    No Known Allergies    Intolerances      MEDICATIONS  (STANDING):  enoxaparin Injectable 40 milliGRAM(s) SubCutaneous daily  lactated ringers. 1000 milliLiter(s) (150 mL/Hr) IV Continuous <Continuous>  lactated ringers. 1000 milliLiter(s) (150 mL/Hr) IV Continuous <Continuous>  losartan 25 milliGRAM(s) Oral daily  pantoprazole  Injectable 40 milliGRAM(s) IV Push daily    MEDICATIONS  (PRN):  HYDROmorphone  Injectable 1 milliGRAM(s) IV Push every 4 hours PRN Severe Pain (7 - 10)  ketorolac   Injectable 30 milliGRAM(s) IV Push every 6 hours PRN Moderate Pain (4 - 6)  ondansetron Injectable 4 milliGRAM(s) IV Push four times a day PRN Nausea and/or Vomiting      PAST MEDICAL & SURGICAL HISTORY:  Pancreatitis    FAMILY HISTORY:    Social History: No hsitory of : Tobacco use, IVDA, EToH  ______________________________________________________________________________________    PHYSICAL EXAM    Daily     Daily   BMI: 28.2 (08-07 @ 01:47)  Change in Weight:  Vital Signs Last 24 Hrs  T(C): 37.1 (08 Aug 2019 20:12), Max: 37.1 (08 Aug 2019 20:12)  T(F): 98.7 (08 Aug 2019 20:12), Max: 98.7 (08 Aug 2019 20:12)  HR: 71 (08 Aug 2019 20:12) (53 - 71)  BP: 146/95 (08 Aug 2019 20:12) (146/95 - 153/99)  BP(mean): --  RR: 18 (08 Aug 2019 20:12) (18 - 18)  SpO2: 98% (08 Aug 2019 20:12) (98% - 100%)    General:  Well developed, well nourished, alert and active, no pallor, NAD.  HEENT:    Normal appearance of conjunctiva, ears, nose, lips, oropharynx, and oral mucosa, anicteric.  Neck:  No masses, no asymmetry.  Lymph Nodes:  No lymphadenopathy.   Cardiovascular:  RRR normal S1/S2, no murmur.  Respiratory:  CTA B/L, normal respiratory effort.   Abdominal:   + diffuse abdominal tenderness   Extremities:   No clubbing or cyanosis, normal capillary refill, no edema.   Skin:   No rash, jaundice, lesions, eczema.   Musculoskeletal:  No joint swelling, erythema or tenderness.   Neuro: No focal deficits.   Other:   _______________________________________________________________________________________________  Lab Results:                          15.5   3.45  )-----------( 153      ( 08 Aug 2019 07:50 )             45.8     08-08    137  |  97  |  4<L>  ----------------------------<  69<L>  4.3   |  32<H>  |  0.84    Ca    9.3      08 Aug 2019 07:50  Mg     1.6     08-08    TPro  7.4  /  Alb  3.9  /  TBili  2.1<H>  /  DBili  0.4<H>  /  AST  159<H>  /  ALT  213<H>  /  AlkPhos  63  08-08    LIVER FUNCTIONS - ( 08 Aug 2019 07:50 )  Alb: 3.9 g/dL / Pro: 7.4 g/dL / ALK PHOS: 63 U/L / ALT: 213 U/L DA / AST: 159 U/L / GGT: x             Triglycerides, Serum: 104 mg/dL (08-08 @ 07:50)  Sedimentation Rate, Erythrocyte: 2 mm/Hr (08-08 @ 07:50)        Stool Results:          RADIOLOGY RESULTS:    EXAM:  CT ABDOMEN AND PELVIS IC                            PROCEDURE DATE:  08/07/2019          INTERPRETATION:  CLINICAL INFORMATION: Severe epigastric pain, history of   pancreatitis.    COMPARISON: None.    PROCEDURE:   CT of the Abdomen and Pelvis was performed with intravenous contrast.   Intravenous contrast: 90 ml Omnipaque 350. 10 ml discarded.  Oral contrast: None.  Sagittal and coronal reformats were performed.    FINDINGS:    LOWER CHEST: Mild bibasilar dependent atelectasis.    LIVER: Mild hepatic steatosis. Low-attenuation lesions in the right   hepatic lobe adjacent to the gallbladder fossa measuring up to 2.2 cm.  BILE DUCTS: Normal caliber.  GALLBLADDER: Within normal limits.  SPLEEN: Within normal limits.  PANCREAS: Within normal limits.  ADRENALS: Within normal limits.  KIDNEYS/URETERS: Within normal limits.    BLADDER: Within normal limits.  REPRODUCTIVE ORGANS: Prostate gland and seminal vesicles are unremarkable.    BOWEL: No bowel obstruction or overt bowel wall thickening. Appendix is   normal.  PERITONEUM: No ascites, pneumoperitoneum, or loculated collection. No   mesenteric adenopathy.  VESSELS: Normal caliber abdominal aorta. Retroaortic left renal vein.  RETROPERITONEUM/LYMPH NODES: No lymphadenopathy.    ABDOMINAL WALL: Tiny fat-containing umbilical hernia.  BONES: Minor degenerative changes of the spine.    IMPRESSION:     No CT evidence of pancreatitis.    Mild hepatic steatosis. Low-attenuation lesions in the right hepatic lobe   adjacent to the gallbladder fossa measuring up to 2.2 cm which could   represent cysts or hemangiomas. Nonemergent abdominal sonogram is   recommended to further characterize.      < from: US Hepatic & Pancreatic (08.08.19 @ 15:09) >    EXAM:  US LIVER AND PANCREAS                            PROCEDURE DATE:  08/08/2019          INTERPRETATION:  CLINICAL INDICATION: 29 years  Male with assess hepatic   steatosis and hemangiomas.    COMPARISON: CT abdomen and pelvis 8/7/2019    The liver parenchyma is echogenic secondary to hepatic steatosis. The   right lobe measures 17.1 cm sagittal. There is a 1.5 cm echogenic mass in   the right hepatic lobe adjacent to the gallbladder fossa, in the location   of a low-density mass on CT likely hemangioma. The second low attenuation   mass seen on CT is not identified on this examination. There is no   intrahepatic biliary duct distention.    The gallbladder demonstrates no wall thickening, masses or calculi. There   is no pericholecystic fluid.    The common duct is normal in caliber measuring 6.8 mm.    The pancreas demonstrates no mass or ductal distention.    The right kidney measures 11.1 cm sagittal . No hydronephrosis, mass or   calculus is present.    The visualized abdominalaorta and IVC are unremarkable.    There is no ascites.    IMPRESSION:    The common duct is mildly dilated for patient's age. If biliary   obstruction is of clinical concern, MRCP is advised.    Echogenic hepatic mass adjacent to the gallbladder fossa, likely focal   fatty sparing. The second low-attenuation lesion seen on CT is not   identified on this examination. Recommend short-term follow-up to ensure   stability or MRI for confirmation.    Mild hepatomegaly and hepatic steatosis.                ABIGAIL CASTILLO M.D., ATTENDING RADIOLOGIST  This document has been electronically signed. Aug  8 2019  3:14PM                < end of copied text >                STAN CORONA D.O. ATTENDING RADIOLOGIST  This document has been electronically signed. Aug  7 2019  4:16AM              SURGICAL PATHOLOGY:

## 2019-08-08 NOTE — PROGRESS NOTE ADULT - PROBLEM SELECTOR PLAN 4
IMPROVE VTE Individual Risk Assessment  RISK                                                          Points  [] Previous VTE                                           3  [] Thrombophilia                                        2  [] Lower limb paralysis                              2   [] Current Cancer                                       2   [X] Immobilization > 24 hrs                        1  [] ICU/CCU stay > 24 hours                       1  [] Age > 60                                                   1  IMPROVE VTE Score = 1    lovenox 40 mg for DVT chemoprophylaxis

## 2019-08-08 NOTE — PROGRESS NOTE ADULT - PROBLEM SELECTOR PLAN 3
IMPROVE VTE Individual Risk Assessment  RISK                                                          Points  [] Previous VTE                                           3  [] Thrombophilia                                        2  [] Lower limb paralysis                              2   [] Current Cancer                                       2   [X] Immobilization > 24 hrs                        1  [] ICU/CCU stay > 24 hours                       1  [] Age > 60                                                   1  IMPROVE VTE Score = 1    lovenox 40 mg for DVT chemoprophylaxis blood pressure persistently elevated  likely worsened by IV fluids  starting cozaar 25mg qd  monitor vitals

## 2019-08-08 NOTE — PROGRESS NOTE ADULT - PROBLEM SELECTOR PLAN 2
improving  f/u CMP daily  c/w IV fluids improving  CT abdomen noted for mild hepatic steatosis and possible hemangiomas  f/u US liver for further eval  f/u CMP daily  c/w IV fluids improving  Tbili increased to 2.1 this AM  CT abdomen noted for mild hepatic steatosis and possible hemangiomas  f/u US liver for further eval  f/u CMP daily  c/w IV fluids

## 2019-08-09 VITALS
RESPIRATION RATE: 16 BRPM | SYSTOLIC BLOOD PRESSURE: 138 MMHG | HEART RATE: 74 BPM | OXYGEN SATURATION: 99 % | DIASTOLIC BLOOD PRESSURE: 96 MMHG | TEMPERATURE: 98 F

## 2019-08-09 LAB
ALBUMIN SERPL ELPH-MCNC: 3.6 G/DL — SIGNIFICANT CHANGE UP (ref 3.5–5)
ALP SERPL-CCNC: 59 U/L — SIGNIFICANT CHANGE UP (ref 40–120)
ALT FLD-CCNC: 157 U/L DA — HIGH (ref 10–60)
ANION GAP SERPL CALC-SCNC: 4 MMOL/L — LOW (ref 5–17)
APAP SERPL-MCNC: <2 UG/ML — LOW (ref 10–30)
AST SERPL-CCNC: 88 U/L — HIGH (ref 10–40)
BASOPHILS # BLD AUTO: 0.03 K/UL — SIGNIFICANT CHANGE UP (ref 0–0.2)
BASOPHILS NFR BLD AUTO: 1 % — SIGNIFICANT CHANGE UP (ref 0–2)
BILIRUB DIRECT SERPL-MCNC: 0.2 MG/DL — SIGNIFICANT CHANGE UP (ref 0–0.2)
BILIRUB INDIRECT FLD-MCNC: 0.7 MG/DL — SIGNIFICANT CHANGE UP (ref 0.2–1)
BILIRUB SERPL-MCNC: 0.9 MG/DL — SIGNIFICANT CHANGE UP (ref 0.2–1.2)
BILIRUB SERPL-MCNC: 0.9 MG/DL — SIGNIFICANT CHANGE UP (ref 0.2–1.2)
BUN SERPL-MCNC: 6 MG/DL — LOW (ref 7–18)
CALCIUM SERPL-MCNC: 9.3 MG/DL — SIGNIFICANT CHANGE UP (ref 8.4–10.5)
CHLORIDE SERPL-SCNC: 102 MMOL/L — SIGNIFICANT CHANGE UP (ref 96–108)
CO2 SERPL-SCNC: 33 MMOL/L — HIGH (ref 22–31)
CREAT SERPL-MCNC: 0.89 MG/DL — SIGNIFICANT CHANGE UP (ref 0.5–1.3)
EOSINOPHIL # BLD AUTO: 0.25 K/UL — SIGNIFICANT CHANGE UP (ref 0–0.5)
EOSINOPHIL NFR BLD AUTO: 9 % — HIGH (ref 0–6)
GLUCOSE SERPL-MCNC: 81 MG/DL — SIGNIFICANT CHANGE UP (ref 70–99)
HAV IGM SER-ACNC: SIGNIFICANT CHANGE UP
HBV CORE IGM SER-ACNC: SIGNIFICANT CHANGE UP
HBV SURFACE AG SER-ACNC: SIGNIFICANT CHANGE UP
HCT VFR BLD CALC: 46.2 % — SIGNIFICANT CHANGE UP (ref 39–50)
HCV AB S/CO SERPL IA: 0.12 S/CO — SIGNIFICANT CHANGE UP (ref 0–0.99)
HCV AB SERPL-IMP: SIGNIFICANT CHANGE UP
HGB BLD-MCNC: 15.4 G/DL — SIGNIFICANT CHANGE UP (ref 13–17)
LACTATE SERPL-SCNC: 0.6 MMOL/L — LOW (ref 0.7–2)
LYMPHOCYTES # BLD AUTO: 0.91 K/UL — LOW (ref 1–3.3)
LYMPHOCYTES # BLD AUTO: 33 % — SIGNIFICANT CHANGE UP (ref 13–44)
MCHC RBC-ENTMCNC: 33 PG — SIGNIFICANT CHANGE UP (ref 27–34)
MCHC RBC-ENTMCNC: 33.3 GM/DL — SIGNIFICANT CHANGE UP (ref 32–36)
MCV RBC AUTO: 98.9 FL — SIGNIFICANT CHANGE UP (ref 80–100)
MONOCYTES # BLD AUTO: 0.3 K/UL — SIGNIFICANT CHANGE UP (ref 0–0.9)
MONOCYTES NFR BLD AUTO: 11 % — SIGNIFICANT CHANGE UP (ref 2–14)
NEUTROPHILS # BLD AUTO: 1.1 K/UL — LOW (ref 1.8–7.4)
NEUTROPHILS NFR BLD AUTO: 40 % — LOW (ref 43–77)
PLATELET # BLD AUTO: 132 K/UL — LOW (ref 150–400)
POTASSIUM SERPL-MCNC: 4.1 MMOL/L — SIGNIFICANT CHANGE UP (ref 3.5–5.3)
POTASSIUM SERPL-SCNC: 4.1 MMOL/L — SIGNIFICANT CHANGE UP (ref 3.5–5.3)
PROT SERPL-MCNC: 7.1 G/DL — SIGNIFICANT CHANGE UP (ref 6–8.3)
RBC # BLD: 4.67 M/UL — SIGNIFICANT CHANGE UP (ref 4.2–5.8)
RBC # FLD: 11.9 % — SIGNIFICANT CHANGE UP (ref 10.3–14.5)
SODIUM SERPL-SCNC: 139 MMOL/L — SIGNIFICANT CHANGE UP (ref 135–145)
WBC # BLD: 2.75 K/UL — LOW (ref 3.8–10.5)
WBC # FLD AUTO: 2.75 K/UL — LOW (ref 3.8–10.5)

## 2019-08-09 PROCEDURE — 99285 EMERGENCY DEPT VISIT HI MDM: CPT | Mod: 25

## 2019-08-09 PROCEDURE — 74181 MRI ABDOMEN W/O CONTRAST: CPT

## 2019-08-09 PROCEDURE — 96376 TX/PRO/DX INJ SAME DRUG ADON: CPT

## 2019-08-09 PROCEDURE — 80053 COMPREHEN METABOLIC PANEL: CPT

## 2019-08-09 PROCEDURE — 80076 HEPATIC FUNCTION PANEL: CPT

## 2019-08-09 PROCEDURE — 85027 COMPLETE CBC AUTOMATED: CPT

## 2019-08-09 PROCEDURE — 83036 HEMOGLOBIN GLYCOSYLATED A1C: CPT

## 2019-08-09 PROCEDURE — 83735 ASSAY OF MAGNESIUM: CPT

## 2019-08-09 PROCEDURE — 84443 ASSAY THYROID STIM HORMONE: CPT

## 2019-08-09 PROCEDURE — 80061 LIPID PANEL: CPT

## 2019-08-09 PROCEDURE — 80074 ACUTE HEPATITIS PANEL: CPT

## 2019-08-09 PROCEDURE — 96375 TX/PRO/DX INJ NEW DRUG ADDON: CPT | Mod: 76

## 2019-08-09 PROCEDURE — 80048 BASIC METABOLIC PNL TOTAL CA: CPT

## 2019-08-09 PROCEDURE — 99239 HOSP IP/OBS DSCHRG MGMT >30: CPT | Mod: GC

## 2019-08-09 PROCEDURE — 81001 URINALYSIS AUTO W/SCOPE: CPT

## 2019-08-09 PROCEDURE — 82248 BILIRUBIN DIRECT: CPT

## 2019-08-09 PROCEDURE — 76705 ECHO EXAM OF ABDOMEN: CPT

## 2019-08-09 PROCEDURE — 74181 MRI ABDOMEN W/O CONTRAST: CPT | Mod: 26

## 2019-08-09 PROCEDURE — 80307 DRUG TEST PRSMV CHEM ANLYZR: CPT

## 2019-08-09 PROCEDURE — 74177 CT ABD & PELVIS W/CONTRAST: CPT

## 2019-08-09 PROCEDURE — 82607 VITAMIN B-12: CPT

## 2019-08-09 PROCEDURE — 96374 THER/PROPH/DIAG INJ IV PUSH: CPT | Mod: XU

## 2019-08-09 PROCEDURE — 36415 COLL VENOUS BLD VENIPUNCTURE: CPT

## 2019-08-09 PROCEDURE — 83605 ASSAY OF LACTIC ACID: CPT

## 2019-08-09 PROCEDURE — 85652 RBC SED RATE AUTOMATED: CPT

## 2019-08-09 PROCEDURE — 83690 ASSAY OF LIPASE: CPT

## 2019-08-09 PROCEDURE — 87086 URINE CULTURE/COLONY COUNT: CPT

## 2019-08-09 RX ORDER — PANTOPRAZOLE SODIUM 20 MG/1
1 TABLET, DELAYED RELEASE ORAL
Qty: 30 | Refills: 0
Start: 2019-08-09 | End: 2019-09-07

## 2019-08-09 RX ORDER — PANTOPRAZOLE SODIUM 20 MG/1
40 TABLET, DELAYED RELEASE ORAL
Refills: 0 | Status: DISCONTINUED | OUTPATIENT
Start: 2019-08-09 | End: 2019-08-09

## 2019-08-09 RX ORDER — LOSARTAN POTASSIUM 100 MG/1
1 TABLET, FILM COATED ORAL
Qty: 30 | Refills: 0
Start: 2019-08-09 | End: 2019-09-07

## 2019-08-09 RX ADMIN — HYDROMORPHONE HYDROCHLORIDE 1 MILLIGRAM(S): 2 INJECTION INTRAMUSCULAR; INTRAVENOUS; SUBCUTANEOUS at 04:47

## 2019-08-09 RX ADMIN — Medication 30 MILLIGRAM(S): at 14:00

## 2019-08-09 RX ADMIN — HYDROMORPHONE HYDROCHLORIDE 1 MILLIGRAM(S): 2 INJECTION INTRAMUSCULAR; INTRAVENOUS; SUBCUTANEOUS at 00:51

## 2019-08-09 RX ADMIN — HYDROMORPHONE HYDROCHLORIDE 1 MILLIGRAM(S): 2 INJECTION INTRAMUSCULAR; INTRAVENOUS; SUBCUTANEOUS at 09:35

## 2019-08-09 RX ADMIN — HYDROMORPHONE HYDROCHLORIDE 1 MILLIGRAM(S): 2 INJECTION INTRAMUSCULAR; INTRAVENOUS; SUBCUTANEOUS at 00:21

## 2019-08-09 RX ADMIN — HYDROMORPHONE HYDROCHLORIDE 1 MILLIGRAM(S): 2 INJECTION INTRAMUSCULAR; INTRAVENOUS; SUBCUTANEOUS at 05:18

## 2019-08-09 RX ADMIN — LOSARTAN POTASSIUM 25 MILLIGRAM(S): 100 TABLET, FILM COATED ORAL at 06:20

## 2019-08-09 RX ADMIN — HYDROMORPHONE HYDROCHLORIDE 1 MILLIGRAM(S): 2 INJECTION INTRAMUSCULAR; INTRAVENOUS; SUBCUTANEOUS at 09:18

## 2019-08-09 RX ADMIN — Medication 30 MILLIGRAM(S): at 14:15

## 2019-08-09 NOTE — PROGRESS NOTE ADULT - SUBJECTIVE AND OBJECTIVE BOX
PGY1 Note discussed with supervising resident and primary attending.    Patient is a 29y old  Male who presents with a chief complaint of abdominal pain and nausea (07 Aug 2019 14:56)      INTERVAL HPI/OVERNIGHT EVENTS:    Mr. Israel is seen at the bedside. He reports that he wants his diet to be advanced as his pain is improving. He rates his pain as 7/10, but says he feels much better than yesterday. He denies any nausea or vomiting. He reports passing flatus, but no stool as of yet, which he attributes to not having eaten anything. He has no other complaints at this time and is in no distress.    MEDICATIONS  (STANDING):  enoxaparin Injectable 40 milliGRAM(s) SubCutaneous daily  lactated ringers. 1000 milliLiter(s) (150 mL/Hr) IV Continuous <Continuous>  lactated ringers. 1000 milliLiter(s) (150 mL/Hr) IV Continuous <Continuous>  pantoprazole  Injectable 40 milliGRAM(s) IV Push daily    MEDICATIONS  (PRN):  HYDROmorphone  Injectable 1 milliGRAM(s) IV Push every 4 hours PRN Severe Pain (7 - 10)  ketorolac   Injectable 30 milliGRAM(s) IV Push every 6 hours PRN Moderate Pain (4 - 6)  ondansetron Injectable 4 milliGRAM(s) IV Push four times a day PRN Nausea and/or Vomiting      Allergies    No Known Allergies    Intolerances        REVIEW OF SYSTEMS:  CONSTITUTIONAL: No fever, weight loss, or fatigue  RESPIRATORY: No cough, wheezing, chills or hemoptysis; No shortness of breath  CARDIOVASCULAR: No chest pain, palpitations, dizziness, or leg swelling  GASTROINTESTINAL: +abd pain - improving; No nausea, vomiting, or hematemesis; No diarrhea or constipation. No melena or hematochezia.  NEUROLOGICAL: No headaches, memory loss, loss of strength, numbness, or tremors  SKIN: No itching, burning, rashes, or lesions     Vital Signs Last 24 Hrs  T(C): 36.4 (08 Aug 2019 05:24), Max: 36.9 (07 Aug 2019 14:08)  T(F): 97.5 (08 Aug 2019 05:24), Max: 98.4 (07 Aug 2019 14:08)  HR: 58 (08 Aug 2019 05:24) (53 - 63)  BP: 153/93 (08 Aug 2019 06:57) (150/102 - 162/91)  BP(mean): --  RR: 18 (08 Aug 2019 05:24) (18 - 19)  SpO2: 99% (08 Aug 2019 05:24) (98% - 100%)    PHYSICAL EXAM:  GENERAL: NAD  HEAD:  Atraumatic, Normocephalic  EYES: EOMI, PERRLA, conjunctiva and sclera clear  NECK: Supple, No JVD, Normal thyroid  CHEST/LUNG: Clear to percussion bilaterally; No rales, rhonchi, wheezing, or rubs  HEART: Regular rate and rhythm; No murmurs, rubs, or gallops  ABDOMEN: Soft, Nondistended; Bowel sounds present; tenderness to light palpation of LUQ, LLQ and midepigastric region  NERVOUS SYSTEM:  Alert & Oriented X3, Good concentration; Motor Strength 5/5 B/L   EXTREMITIES:  2+ Peripheral Pulses, No clubbing, cyanosis, or edema    LABS:                        15.5   3.45  )-----------( 153      ( 08 Aug 2019 07:50 )             45.8     08-    137  |  97  |  4<L>  ----------------------------<  69<L>  4.3   |  32<H>  |  0.84    Ca    9.3      08 Aug 2019 07:50  Mg     1.6     -    TPro  7.4  /  Alb  3.9  /  TBili  2.1<H>  /  DBili  0.4<H>  /  AST  159<H>  /  ALT  213<H>  /  AlkPhos  63  08      Urinalysis Basic - ( 07 Aug 2019 04:23 )    Color: Yellow / Appearance: Clear / S.005 / pH: x  Gluc: x / Ketone: Small  / Bili: Negative / Urobili: 4   Blood: x / Protein: 30 mg/dL / Nitrite: Negative   Leuk Esterase: Negative / RBC: 0-2 /HPF / WBC 0-2 /HPF   Sq Epi: x / Non Sq Epi: Few /HPF / Bacteria: Moderate /HPF      CAPILLARY BLOOD GLUCOSE          RADIOLOGY & ADDITIONAL TESTS:    Imaging Personally Reviewed:  [X] YES  [ ] NO    Consultant(s) Notes Reviewed:  [X] YES  [ ] NO
Subjective:   Abdominal pain is improving     Objective:    MEDICATIONS  (STANDING):  enoxaparin Injectable 40 milliGRAM(s) SubCutaneous daily  losartan 25 milliGRAM(s) Oral daily  pantoprazole    Tablet 40 milliGRAM(s) Oral before breakfast    MEDICATIONS  (PRN):  ketorolac   Injectable 30 milliGRAM(s) IV Push every 6 hours PRN Moderate Pain (4 - 6)  ondansetron Injectable 4 milliGRAM(s) IV Push four times a day PRN Nausea and/or Vomiting              Vital Signs Last 24 Hrs  T(C): 36.9 (09 Aug 2019 13:54), Max: 37.1 (08 Aug 2019 20:12)  T(F): 98.4 (09 Aug 2019 13:54), Max: 98.7 (08 Aug 2019 20:12)  HR: 59 (09 Aug 2019 13:54) (53 - 79)  BP: 136/95 (09 Aug 2019 13:54) (122/80 - 150/92)  BP(mean): --  RR: 16 (09 Aug 2019 13:54) (16 - 18)  SpO2: 99% (09 Aug 2019 13:54) (98% - 100%)      General:  Well developed, well nourished, alert and active, no pallor, NAD.  HEENT:    Normal appearance of conjunctiva, ears, nose, lips, oropharynx, and oral mucosa, anicteric.  Neck:  No masses, no asymmetry.  Lymph Nodes:  No lymphadenopathy.   Cardiovascular:  RRR normal S1/S2, no murmur.  Respiratory:  CTA B/L, normal respiratory effort.   Abdominal:   soft, no masses or tenderness, normoactive BS, NT/ND, no HSM.  Extremities:   No clubbing or cyanosis, normal capillary refill, no edema.   Skin:   No rash, jaundice, lesions, eczema.   Musculoskeletal:  No joint swelling, erythema or tenderness.   Neuro: No focal deficits.   Other:       LABS:                        15.4   2.75  )-----------( 132      ( 09 Aug 2019 07:21 )             46.2     08-09    139  |  102  |  6<L>  ----------------------------<  81  4.1   |  33<H>  |  0.89    Ca    9.3      09 Aug 2019 07:21  Mg     1.6     08-08    TPro  7.1  /  Alb  3.6  /  TBili  0.9  /  DBili  0.2  /  AST  88<H>  /  ALT  157<H>  /  AlkPhos  59  08-09          RADIOLOGY & ADDITIONAL TESTS:

## 2019-08-09 NOTE — DISCHARGE NOTE NURSING/CASE MANAGEMENT/SOCIAL WORK - NSDCDPATPORTLINK_GEN_ALL_CORE
You can access the [facility] Patient Portal, offered by [facility], by registering with the following website: [email or web address]

## 2019-10-19 NOTE — PROGRESS NOTE ADULT - ASSESSMENT
27 years old male with PMHx of 5 episodes of acute pancreatitis in last 15 months presented with severe abdominal pain. Patient pain started suddenly, 10/10 in intensity, starting at epigastrium and radiating straight to back. Patient thinks that her pain is similar to his previous episodes of pancreatitis Patient last episode was owed to trauma. patient only drink alcohol occasionally he had extensive workup done including biopsy of pancreatitic tissue but cause of pancreatitis remained unclear. patient denies any chest pain, palpitations, shortness of breath, fever, chills, change in urinary or bowel habits.    Admitted to medicine for intractable abdominal pain with initial suspicion of acute pancreatitis.
The patient is pending MRCP. He should follow up with my office @[phone]  He will need a referral for an EUS (pending the MRCP and possibly a ERCP with sphincterotomy  Advanced care planning was discussed with patient and family.  Advanced care planning forms were reviewed and discussed.  Risks, benefits and alternatives of gastroenterologic procedures were discussed in detail and all questions were answered.
Home

## 2022-06-10 NOTE — H&P ADULT - ASSESSMENT
show
27 years old male with PMHx of 5 episodes of acute pancreatitis in last 15 months presented with severe abdominal pain. Patient pain started suddenly, 10/10 in intensity, starting at epigastrium and radiating straight to back. Patient thinks that her pain is similar to his previous episodes of pancreatitis Patient last episode was owed to trauma. patient only drink alcohol occasionally he had extensive workup done including biopsy of pancreatitic tissue but cause of pancreatitis remained unclear.  patient denies any chest pain, palpitations, shortness of breath, fever, chills, change in urinary or bowel habits
unknown

## 2023-02-21 NOTE — CT
EXAM:

  CT Abdomen and Pelvis With Intravenous Contrast



EXAM DATE/TIME:

  12/27/2017 1:34 AM



CLINICAL HISTORY:

  27 years old, male; Pain; Abdominal pain; Acute



TECHNIQUE:

  Axial computed tomography images of the abdomen and pelvis with intravenous 

contrast.  All CT scans at this facility use one or more dose reduction 

techniques, viz.: automated exposure control; ma/kV adjustment per patient size 

(including targeted exams where dose is matched to indication; i.e. head); or 

iterative reconstruction technique.

  Coronal and sagittal reformatted images were created and reviewed.



CONTRAST:

  100 mL of OMNI 350 administered intravenously.



COMPARISON:

  No relevant prior studies available.



FINDINGS:

  Lower thorax:  Small hiatal hernia. Nodular wall thickening of sigmoid colon.



 ABDOMEN:

  Liver:  Unremarkable.  No mass.

  Gallbladder and bile ducts:  Unremarkable.  No calcified stones.  No ductal 

dilation.

  Pancreas:  Unremarkable.  No mass.  No ductal dilation.

  Spleen:  Unremarkable.  No splenomegaly.

  Adrenals:  Unremarkable.  No mass.

  Kidneys and ureters:  Unremarkable.  No solid mass.  No hydronephrosis.

  Stomach and bowel:  Unremarkable.  No dilatation of small or large bowel.  No 

mucosal thickening.

  Appendix:  Normal.



 PELVIS:

  Bladder:  Unremarkable.  No mass.

  Reproductive:  Unremarkable as visualized.



 ABDOMEN and PELVIS:

  Intraperitoneal space:  Unremarkable.  No free air.  No significant fluid 

collection.

  Bones/joints:  No acute fracture.

  Soft tissues:  Unremarkable.

  Vasculature:  Unremarkable.  No abdominal aortic aneurysm.

  Lymph nodes:  1.2 cm pericaval node.



IMPRESSION:     

  Nonspecific sigmoid colitis No
